# Patient Record
Sex: FEMALE | Race: WHITE | NOT HISPANIC OR LATINO | URBAN - METROPOLITAN AREA
[De-identification: names, ages, dates, MRNs, and addresses within clinical notes are randomized per-mention and may not be internally consistent; named-entity substitution may affect disease eponyms.]

---

## 2017-04-13 ENCOUNTER — OUTPATIENT (OUTPATIENT)
Dept: OUTPATIENT SERVICES | Facility: HOSPITAL | Age: 72
LOS: 1 days | Discharge: HOME | End: 2017-04-13

## 2017-06-27 DIAGNOSIS — J44.1 CHRONIC OBSTRUCTIVE PULMONARY DISEASE WITH (ACUTE) EXACERBATION: ICD-10-CM

## 2017-10-23 ENCOUNTER — OUTPATIENT (OUTPATIENT)
Dept: OUTPATIENT SERVICES | Facility: HOSPITAL | Age: 72
LOS: 1 days | Discharge: HOME | End: 2017-10-23

## 2018-11-15 ENCOUNTER — EMERGENCY (EMERGENCY)
Facility: HOSPITAL | Age: 73
LOS: 0 days | Discharge: HOME | End: 2018-11-15
Admitting: PHYSICIAN ASSISTANT

## 2018-11-15 VITALS
TEMPERATURE: 97 F | RESPIRATION RATE: 18 BRPM | SYSTOLIC BLOOD PRESSURE: 190 MMHG | DIASTOLIC BLOOD PRESSURE: 85 MMHG | OXYGEN SATURATION: 98 % | HEART RATE: 70 BPM

## 2018-11-15 DIAGNOSIS — M79.2 NEURALGIA AND NEURITIS, UNSPECIFIED: ICD-10-CM

## 2018-11-15 DIAGNOSIS — R51 HEADACHE: ICD-10-CM

## 2018-11-15 RX ORDER — CARBAMAZEPINE 200 MG
1 TABLET ORAL
Qty: 20 | Refills: 0
Start: 2018-11-15 | End: 2018-11-24

## 2018-11-15 NOTE — ED PROVIDER NOTE - PROVIDER TOKENS
FREE:[LAST:[[facility]],PHONE:[phone]],FAX:[(   [phone]    -],ADDRESS:[[address] [place] [address], [id number]]]

## 2018-11-15 NOTE — ED PROVIDER NOTE - OBJECTIVE STATEMENT
Pt has 5 days of intermitent shocking right facial pain that lasts for seconds and comes randomly. No vision loss or changes or eye pain. Seen by dental yesterday and had xrays and told pain is not dental. Denies weakness, numbness.

## 2018-11-15 NOTE — ED PROVIDER NOTE - PHYSICAL EXAMINATION
CONST: Well appearing in NAD  EYES: PERRL, EOMI, Sclera and conjunctiva clear.   ENT: No nasal discharge. TM's clear B/L without drainage. Oropharynx normal appearing, no erythema or exudates. Uvula midline. Pt has dentures and no teeth in area of pain. No facial swelling  NECK: Non-tender, no meningeal signs  CARD: Normal S1 S2; Normal rate and rhythm  RESP: Equal BS B/L, No wheezes, rhonchi or rales. No distress  GI: Soft, non-tender, non-distended.  MS: Normal ROM in all extremities. No midline spinal tenderness.  SKIN: Warm, dry, no acute rashes. Good turgor  NEURO: A&Ox3, No focal deficits. Strength 5/5 with no sensory deficits. Steady gait

## 2019-06-24 ENCOUNTER — OUTPATIENT (OUTPATIENT)
Dept: OUTPATIENT SERVICES | Facility: HOSPITAL | Age: 74
LOS: 1 days | Discharge: HOME | End: 2019-06-24
Payer: MEDICARE

## 2019-06-24 DIAGNOSIS — R06.00 DYSPNEA, UNSPECIFIED: ICD-10-CM

## 2019-06-24 PROCEDURE — 71250 CT THORAX DX C-: CPT | Mod: 26

## 2019-07-25 ENCOUNTER — INPATIENT (INPATIENT)
Facility: HOSPITAL | Age: 74
LOS: 0 days | Discharge: HOME | End: 2019-07-26
Attending: INTERNAL MEDICINE | Admitting: INTERNAL MEDICINE
Payer: MEDICARE

## 2019-07-25 VITALS
HEART RATE: 66 BPM | DIASTOLIC BLOOD PRESSURE: 65 MMHG | TEMPERATURE: 98 F | SYSTOLIC BLOOD PRESSURE: 127 MMHG | RESPIRATION RATE: 20 BRPM

## 2019-07-25 DIAGNOSIS — Z90.710 ACQUIRED ABSENCE OF BOTH CERVIX AND UTERUS: Chronic | ICD-10-CM

## 2019-07-25 PROCEDURE — 93010 ELECTROCARDIOGRAM REPORT: CPT | Mod: 59

## 2019-07-25 PROCEDURE — 92943 PRQ TRLUML REVSC CH OCC ANT: CPT | Mod: RC

## 2019-07-25 RX ORDER — CLOPIDOGREL BISULFATE 75 MG/1
75 TABLET, FILM COATED ORAL DAILY
Refills: 0 | Status: DISCONTINUED | OUTPATIENT
Start: 2019-07-25 | End: 2019-07-26

## 2019-07-25 RX ORDER — SODIUM CHLORIDE 9 MG/ML
600 INJECTION INTRAMUSCULAR; INTRAVENOUS; SUBCUTANEOUS
Refills: 0 | Status: DISCONTINUED | OUTPATIENT
Start: 2019-07-25 | End: 2019-07-26

## 2019-07-25 RX ORDER — ASPIRIN/CALCIUM CARB/MAGNESIUM 324 MG
81 TABLET ORAL DAILY
Refills: 0 | Status: DISCONTINUED | OUTPATIENT
Start: 2019-07-25 | End: 2019-07-26

## 2019-07-25 RX ORDER — LANOLIN ALCOHOL/MO/W.PET/CERES
3 CREAM (GRAM) TOPICAL AT BEDTIME
Refills: 0 | Status: DISCONTINUED | OUTPATIENT
Start: 2019-07-25 | End: 2019-07-26

## 2019-07-25 RX ORDER — AMLODIPINE BESYLATE 2.5 MG/1
5 TABLET ORAL DAILY
Refills: 0 | Status: DISCONTINUED | OUTPATIENT
Start: 2019-07-25 | End: 2019-07-26

## 2019-07-25 RX ORDER — ATORVASTATIN CALCIUM 80 MG/1
40 TABLET, FILM COATED ORAL AT BEDTIME
Refills: 0 | Status: DISCONTINUED | OUTPATIENT
Start: 2019-07-25 | End: 2019-07-26

## 2019-07-25 RX ORDER — CARBAMAZEPINE 200 MG
100 TABLET ORAL
Refills: 0 | Status: DISCONTINUED | OUTPATIENT
Start: 2019-07-25 | End: 2019-07-26

## 2019-07-25 RX ORDER — AMLODIPINE BESYLATE 2.5 MG/1
0 TABLET ORAL
Qty: 0 | Refills: 0 | DISCHARGE

## 2019-07-25 RX ADMIN — ATORVASTATIN CALCIUM 40 MILLIGRAM(S): 80 TABLET, FILM COATED ORAL at 22:04

## 2019-07-25 RX ADMIN — Medication 3 MILLIGRAM(S): at 22:04

## 2019-07-25 RX ADMIN — SODIUM CHLORIDE 100 MILLILITER(S): 9 INJECTION INTRAMUSCULAR; INTRAVENOUS; SUBCUTANEOUS at 18:06

## 2019-07-25 NOTE — CHART NOTE - NSCHARTNOTEFT_GEN_A_CORE
PRE-OP DIAGNOSIS: suspected CAD, abnormal stress test    PROCEDURE: [facility] with coronary angiography    Physician: Dr Manuel Sosa  Assistant: Dr Iram Tate    ANESTHESIA TYPE:  [  ]General Anesthesia  [ x ] Sedation  [  ] Local/Regional    ESTIMATED BLOOD LOSS:    10   mL    CONDITION  [  ] Critical  [  ] Serious  [  ]Fair  [ x ]Good    IV CONTRAST:  170   mL    FINDINGS  Left Heart Catheterization:  LVEF%: 75  LVEDP: nl  [ ] Normal Coronary Arteries  [ ] Luminal Irregularities  [ ] Non-obstructive CAD    ACCESS:    [x ] right radial artery  [ x] right femoral artery    LEFT HEART CATHETERIZATION                                    Left main: no disease  LAD: mild atherosclerosis  Left Circumflex: 40% lesion in distal segment  Right Coronary Artery: 100% lesion ( ) in prox segment )      INTERVENTION  IMPLANTS: 1 ALICIA    POST-OP DIAGNOSIS  1 vessel CAD ( prox RCA ) s/p 1 ALICIA .      PLAN OF CARE  [ ] D/C Home today  [ ]  D/C in AM  [ ] Return to In-patient bed  [x ] Admit for observation  [ ] Return for staged procedure:  [ ] CT Surgery consult called  [x ]  Continue DAPT, & Statin therapy PRE-OP DIAGNOSIS: suspected CAD, abnormal stress test    PROCEDURE: [facility] with coronary angiography    Physician: Dr Manuel Sosa  Assistant: Dr Iram Tate    ANESTHESIA TYPE:  [  ]General Anesthesia  [ x ] Sedation  [  ] Local/Regional    ESTIMATED BLOOD LOSS:    10   mL    CONDITION  [  ] Critical  [  ] Serious  [  ]Fair  [ x ]Good    IV CONTRAST:  170   mL    FINDINGS  Left Heart Catheterization:  LVEF%: 75  LVEDP: nl  [ ] Normal Coronary Arteries  [ ] Luminal Irregularities  [ ] Non-obstructive CAD    ACCESS:    [x ] right radial artery  [ x] right femoral artery    LEFT HEART CATHETERIZATION                                    Left main: no disease  LAD: mild atherosclerosis  Left Circumflex: 40% lesion in distal segment  Right Coronary Artery: 100% lesion ( ) in prox segment ) , 40% lesion in distal segment      INTERVENTION  IMPLANTS: 1 ALICIA    POST-OP DIAGNOSIS  1 vessel CAD ( prox RCA ) s/p 1 ALICIA .      PLAN OF CARE  [ ] D/C Home today  [ ]  D/C in AM  [ ] Return to In-patient bed  [x ] Admit for observation  [ ] Return for staged procedure:  [ ] CT Surgery consult called  [x ]  Continue DAPT, & Statin therapy PRE-OP DIAGNOSIS: suspected CAD, abnormal stress test    PROCEDURE: [facility] with coronary angiography    Physician: Dr Manuel Sosa  Assistant: Dr Iram Tate    ANESTHESIA TYPE:  [  ]General Anesthesia  [ x ] Sedation  [  ] Local/Regional    ESTIMATED BLOOD LOSS:    10   mL    CONDITION  [  ] Critical  [  ] Serious  [x ]Fair  [  ]Good    IV CONTRAST:  170   mL    FINDINGS  Left Heart Catheterization:  LVEF%: 65  LVEDP: nl  Severe 1 vessel disease RCA   ACCESS:    [x ] right radial artery  [ x] right femoral artery    LEFT HEART CATHETERIZATION                                    Left main: no disease  LAD: mild extensive atherosclerosis  Left Circumflex: 40% lesion in distal segment  Right Coronary Artery: 100% lesion ( ) in prox segment ) , 40% lesion in distal segment prior to bifurcation to PDa and PLV      INTERVENTION  IMPLANTS: 1 ALICIA    POST-OP DIAGNOSIS  1 vessel CAD ( prox RCA ) s/p 1 ALICIA .      PLAN OF CARE  [ ] D/C Home today  [ ]  D/C in AM  [ ] Return to In-patient bed  [x ] Admit for observation  [ ] Return for staged procedure:  [ ] CT Surgery consult called  [x ]  Continue DAPT, & Statin therapy PRE-OP DIAGNOSIS: suspected CAD, abnormal stress test    PROCEDURE: [facility] with coronary angiography    Physician: Dr Manuel Sosa  Assistant: Dr Iram Tate    ANESTHESIA TYPE:  [  ]General Anesthesia  [ x ] Sedation  [  ] Local/Regional    ESTIMATED BLOOD LOSS:    10   mL    CONDITION  [  ] Critical  [  ] Serious  [x ]Fair  [  ]Good    IV CONTRAST:  170   mL    FINDINGS  Left Heart Catheterization:  LVEF%: 65  LVEDP: nl  Severe 1 vessel disease RCA   ACCESS:    [x ] right radial artery  [ x] right femoral artery    LEFT HEART CATHETERIZATION                                    Left main: no disease  LAD: mild extensive atherosclerosis  Left Circumflex: 40% lesion in distal segment  Right Coronary Artery: 100% lesion ( ) in prox segment ) , 40% lesion in distal segment prior to bifurcation to PDa and PLV      INTERVENTION  IMPLANTS: 1 ALICIA    POST-OP DIAGNOSIS  1 vessel CAD ( prox RCA ) s/p 1 ALICIA .      PLAN OF CARE  [ ] D/C Home today  [x ]  D/C in AM  [ ] Return to In-patient bed  [x ] Admit for observation  [ ] Return for staged procedure:  [ ] CT Surgery consult called  [x ]  Continue DAPT, & Statin therapy

## 2019-07-25 NOTE — H&P CARDIOLOGY - HISTORY OF PRESENT ILLNESS
74 yo female with hx of HTN, CVA, smoker, has been having SOB, YEE, underwent nuclear stress test revelead a moderate partially reversible inferolateral wall defect suggestive of RCA stenosis, scheduled to have cardiac Cath. she feels OK today

## 2019-07-26 ENCOUNTER — TRANSCRIPTION ENCOUNTER (OUTPATIENT)
Age: 74
End: 2019-07-26

## 2019-07-26 VITALS — HEART RATE: 78 BPM | DIASTOLIC BLOOD PRESSURE: 66 MMHG | SYSTOLIC BLOOD PRESSURE: 128 MMHG

## 2019-07-26 LAB
ALBUMIN SERPL ELPH-MCNC: 3.5 G/DL — SIGNIFICANT CHANGE UP (ref 3.5–5.2)
ALP SERPL-CCNC: 97 U/L — SIGNIFICANT CHANGE UP (ref 30–115)
ALT FLD-CCNC: 16 U/L — SIGNIFICANT CHANGE UP (ref 0–41)
ANION GAP SERPL CALC-SCNC: 9 MMOL/L — SIGNIFICANT CHANGE UP (ref 7–14)
AST SERPL-CCNC: 15 U/L — SIGNIFICANT CHANGE UP (ref 0–41)
BILIRUB SERPL-MCNC: 0.5 MG/DL — SIGNIFICANT CHANGE UP (ref 0.2–1.2)
BUN SERPL-MCNC: 8 MG/DL — LOW (ref 10–20)
CALCIUM SERPL-MCNC: 8.8 MG/DL — SIGNIFICANT CHANGE UP (ref 8.5–10.1)
CHLORIDE SERPL-SCNC: 107 MMOL/L — SIGNIFICANT CHANGE UP (ref 98–110)
CO2 SERPL-SCNC: 26 MMOL/L — SIGNIFICANT CHANGE UP (ref 17–32)
CREAT SERPL-MCNC: 0.6 MG/DL — LOW (ref 0.7–1.5)
GLUCOSE SERPL-MCNC: 104 MG/DL — HIGH (ref 70–99)
HCT VFR BLD CALC: 38.8 % — SIGNIFICANT CHANGE UP (ref 37–47)
HGB BLD-MCNC: 12.4 G/DL — SIGNIFICANT CHANGE UP (ref 12–16)
MCHC RBC-ENTMCNC: 28.6 PG — SIGNIFICANT CHANGE UP (ref 27–31)
MCHC RBC-ENTMCNC: 32 G/DL — SIGNIFICANT CHANGE UP (ref 32–37)
MCV RBC AUTO: 89.6 FL — SIGNIFICANT CHANGE UP (ref 81–99)
NRBC # BLD: 0 /100 WBCS — SIGNIFICANT CHANGE UP (ref 0–0)
PLATELET # BLD AUTO: 217 K/UL — SIGNIFICANT CHANGE UP (ref 130–400)
POTASSIUM SERPL-MCNC: 4.1 MMOL/L — SIGNIFICANT CHANGE UP (ref 3.5–5)
POTASSIUM SERPL-SCNC: 4.1 MMOL/L — SIGNIFICANT CHANGE UP (ref 3.5–5)
PROT SERPL-MCNC: 5.5 G/DL — LOW (ref 6–8)
RBC # BLD: 4.33 M/UL — SIGNIFICANT CHANGE UP (ref 4.2–5.4)
RBC # FLD: 13.2 % — SIGNIFICANT CHANGE UP (ref 11.5–14.5)
SODIUM SERPL-SCNC: 142 MMOL/L — SIGNIFICANT CHANGE UP (ref 135–146)
WBC # BLD: 9.41 K/UL — SIGNIFICANT CHANGE UP (ref 4.8–10.8)
WBC # FLD AUTO: 9.41 K/UL — SIGNIFICANT CHANGE UP (ref 4.8–10.8)

## 2019-07-26 PROCEDURE — 93010 ELECTROCARDIOGRAM REPORT: CPT

## 2019-07-26 RX ORDER — CLOPIDOGREL BISULFATE 75 MG/1
1 TABLET, FILM COATED ORAL
Qty: 30 | Refills: 0
Start: 2019-07-26 | End: 2019-08-24

## 2019-07-26 RX ORDER — LANOLIN ALCOHOL/MO/W.PET/CERES
1 CREAM (GRAM) TOPICAL
Qty: 0 | Refills: 0 | DISCHARGE
Start: 2019-07-26

## 2019-07-26 RX ADMIN — Medication 100 MILLIGRAM(S): at 06:02

## 2019-07-26 RX ADMIN — AMLODIPINE BESYLATE 5 MILLIGRAM(S): 2.5 TABLET ORAL at 06:01

## 2019-07-26 NOTE — PROGRESS NOTE ADULT - SUBJECTIVE AND OBJECTIVE BOX
Cardiology Follow up    DESTINY TONEY   73y Female  PAST MEDICAL & SURGICAL HISTORY:  COPD (chronic obstructive pulmonary disease)  HTN (hypertension)  Anxiety  CVA (cerebral vascular accident)  S/P total abdominal hysterectomy       HPI:  72 yo female with hx of HTN, CVA, smoker, has been having SOB, YEE, underwent nuclear stress test revelead a moderate partially reversible inferolateral wall defect suggestive of RCA stenosis, scheduled to have cardiac Cath. she feels OK today (25 Jul 2019 10:12)    Allergies    No Known Allergies    Intolerances      Patient without complaints. Pt ambulated without issues/symptoms  Denies CP, SOB, palpitations, or dizziness  No events on telemetry overnight    Vital Signs Last 24 Hrs  T(C): 36.4 (26 Jul 2019 05:58), Max: 36.7 (25 Jul 2019 16:08)  T(F): 97.6 (26 Jul 2019 05:58), Max: 98.1 (25 Jul 2019 16:08)  HR: 83 (26 Jul 2019 05:58) (66 - 83)  BP: 144/65 (26 Jul 2019 05:58) (127/65 - 144/65)  BP(mean): --  RR: 20 (26 Jul 2019 05:58) (20 - 20)  SpO2: --    MEDICATIONS  (STANDING):  amLODIPine   Tablet 5 milliGRAM(s) Oral daily  aspirin  chewable 81 milliGRAM(s) Oral daily  atorvastatin 40 milliGRAM(s) Oral at bedtime  carBAMazepine ER Capsule. 100 milliGRAM(s) Oral two times a day  clopidogrel Tablet 75 milliGRAM(s) Oral daily  melatonin 3 milliGRAM(s) Oral at bedtime  sodium chloride 0.9%. 600 milliLiter(s) (100 mL/Hr) IV Continuous <Continuous>    MEDICATIONS  (PRN):      REVIEW OF SYSTEMS:          CONSTITUTIONAL: No weakness, fevers or chills          EYES/ENT: No visual changes;  No vertigo or throat pain           NECK: No pain or stiffness          RESPIRATORY: No cough, wheezing, hemoptysis          CARDIOVASCULAR: no pain, no YEE, no palpitations           GASTROINTESTINAL: No abdominal or epigastric pain. No nausea, vomiting, or hematemesis;           GENITOURINARY: No dysuria, frequency or hematuria          NEUROLOGICAL: No numbness or weakness          SKIN: No itching, rashes       EXAM:           CONSTITUTIONAL: Well-developed; well-nourished; in no acute distress  	SKIN: warm, dry  	HEAD: Normocephalic; atraumatic  	EYES: PERRL.  	ENT: No nasal discharge, airway clear, mucous membranes moist  	NECK: Supple; non tender.  	CARD: +S1, +S2, no murmurs, gallops, or rubs. (Regular) rate and rhythm    	RESP: No wheezes, rales or rhonchi. CTA B/L  	ABD: soft ntnd, + BS x 4 quadrants  	EXT: moves all extremities,  no clubbing, cyanosis or edema  	NEURO: Alert and oriented x3, no focal deficits          PSYCH: Cooperative, appropriate          VASCULAR:  + Rad / + PTs / + DPs          EXTREMITY:             Right Groin:  Dressing removed, access site soft, no hematoma, no pain, no numbness, no signs and symptoms of infection  	   Right Radial: Dressing removed, access site soft, no hematoma, no pain, no numbness, no signs and symptoms of infection             ECG:   Ventricular Rate 80 BPM    Atrial Rate 80 BPM    P-R Interval 158 ms    QRS Duration 72 ms    Q-T Interval 386 ms    QTC Calculation(Bezet) 445 ms    P Axis 79 degrees    R Axis 53 degrees    T Axis 63 degrees    Diagnosis Line Normal sinus rhythm  Normal ECG    Confirmed by BARNEY CALDERON MD ([id number]) on 7/26/2019 8:35:08 AM                                                                                                                    LABS:                        12.4   9.41  )-----------( 217      ( 26 Jul 2019 00:52 )             38.8     07-26    142  |  107  |  8<L>  ----------------------------<  104<H>  4.1   |  26  |  0.6<L>    Ca    8.8      26 Jul 2019 00:52    TPro  5.5<L>  /  Alb  3.5  /  TBili  0.5  /  DBili  x   /  AST  15  /  ALT  16  /  AlkPhos  97  07-26        LIVER FUNCTIONS - ( 26 Jul 2019 00:52 )  Alb: 3.5 g/dL / Pro: 5.5 g/dL / ALK PHOS: 97 U/L / ALT: 16 U/L / AST: 15 U/L / GGT: x             A/P:  I discussed the case with Cardiologist Dr. Harden and recommend the following:    S/P PCI  RCA   	     Continue DAPT (asa 81mg daily, plavix 75mg daily),  Statin Therapy with prior meds                   no BB due to asthma/COPD                   Patient given 30 day supply of ( Aspirin 81 mg daily and Plavix 75 mg daily ) to take at home                   Patient agreeing to take DAPT for at least one year or as directed by cardiologist                    Pt given instructions on importance of taking antiplatelet medication or risk acute stent thrombosis/death                   Post cath instructions, access site care and activity restrictions reviewed with patient                     Discussed with patient to return to hospital if experience chest pain, shortness breath, dizziness and site bleeding                   Aggressive risk factor modification, diet counseling, smoking cessation discussed with patient                     smoking cessation education reinforced                     Can discharge patient from cardiac standpoint as discussed with Dr. Harden                   Follow up with Cardiology Dr. Harden in 1-2weeks.  Instructed to call and make an appointment
Subjective:  post elective Cath 100% mid RCA stenosis, post RCA  stent, successful, briefly was admitted for observation over night, no complication, no event, no symptom, eager to go home.  no chest pain, sob, palpitation, n/vomiting, no fever, no bleeding    MEDICATIONS  (STANDING):  amLODIPine   Tablet 5 milliGRAM(s) Oral daily  aspirin  chewable 81 milliGRAM(s) Oral daily  atorvastatin 40 milliGRAM(s) Oral at bedtime  carBAMazepine ER Capsule. 100 milliGRAM(s) Oral two times a day  clopidogrel Tablet 75 milliGRAM(s) Oral daily  melatonin 3 milliGRAM(s) Oral at bedtime  sodium chloride 0.9%. 600 milliLiter(s) (100 mL/Hr) IV Continuous <Continuous>    MEDICATIONS  (PRN):            Vital Signs Last 24 Hrs  T(C): 36.4 (26 Jul 2019 05:58), Max: 36.7 (25 Jul 2019 16:08)  T(F): 97.6 (26 Jul 2019 05:58), Max: 98.1 (25 Jul 2019 16:08)  HR: 83 (26 Jul 2019 05:58) (66 - 83)  BP: 144/65 (26 Jul 2019 05:58) (127/65 - 144/65)  BP(mean): --  RR: 20 (26 Jul 2019 05:58) (20 - 20)  SpO2: --             REVIEW OF SYSTEMS:  CONSTITUTIONAL: no fever, no chills, no diaphoresis  CARDIOLOGY: no chest pain, no SOB, no palpitation, no diaphoresis, no faint   RESPIRATORY: no dyspnea, no wheeze, no orthopnea,   NEUROLOGICAL: no dizziness, headache, focal deficits to report.  GI: no abdominal pain, no dyspepsia, no nausea, no vomiting, no diarrhea.    HEENT: no congestion, no nasal bleeding  SKIN: no ecchymosis, no petechia             PHYSICAL EXAM:  · CONSTITUTIONAL: Looks stable,   . NECK: Supple, no JVD, no bruit on either carotid side   · RESPIRATORY: Normal air entry to lung base, no wheeze, no crackle, no wet rales  · CARDIOVASCULAR: Normal S1, A2, P2, no murmur, no click, regular rate,  no rub,  · EXTREMITIES: No cyanosis, no clubbing, no edema, right radial pulse 2+, mild echymosis  · VASCULAR: Pulses are regular, equal, bilateral in upper and lower extremities  	  TELEMETRY: NSR    ECG: < from: 12 Lead ECG (07.26.19 @ 07:32) >   Normal sinus rhythm  Normal ECG    < end of copied text >      TTE: outside, normal ejection fraction    LABS:                        12.4   9.41  )-----------( 217      ( 26 Jul 2019 00:52 )             38.8     07-26    142  |  107  |  8<L>  ----------------------------<  104<H>  4.1   |  26  |  0.6<L>    Ca    8.8      26 Jul 2019 00:52    TPro  5.5<L>  /  Alb  3.5  /  TBili  0.5  /  DBili  x   /  AST  15  /  ALT  16  /  AlkPhos  97  07-26            I&O's Summary    BNP  RADIOLOGY & ADDITIONAL STUDIES:    IMPRESSION AND PLAN:

## 2019-07-26 NOTE — DISCHARGE NOTE PROVIDER - NSDCCPCAREPLAN_GEN_ALL_CORE_FT
PRINCIPAL DISCHARGE DIAGNOSIS  Diagnosis: CAD S/P percutaneous coronary angioplasty  Assessment and Plan of Treatment: continue medical regimen as prescribed

## 2019-07-26 NOTE — PROGRESS NOTE ADULT - ASSESSMENT
post elective cath,  of the RCA  successful PCI-stent of the RCA    stable to be d/c home   current therapy  f/u with me at  in 2-3 weeks

## 2019-07-26 NOTE — DISCHARGE NOTE PROVIDER - NSDCFUADDINST_GEN_ALL_CORE_FT
no heavy lifting > 10lbs x 1 week; no driving x 24 hours; no baths, swimming pools x 1 week; may shower  continue DAPT, Statin.  Instructed to call 911 if chest pain, shortness of breath or bleeding from access site. F/U with cardiologist in 1 week  low sodium low fat low cholesterol diet  continue medical regimen to prevent chest pain

## 2019-07-26 NOTE — DISCHARGE NOTE NURSING/CASE MANAGEMENT/SOCIAL WORK - NSDCDPATPORTLINK_GEN_ALL_CORE
You can access the [facility] Patient Portal, offered by [facility], by registering with the following website: [email or web address]

## 2019-07-26 NOTE — DISCHARGE NOTE PROVIDER - HOSPITAL COURSE
74 y/o F s/p PCI  RCA, admitted for observation, no events overnight, stable for discharge as discussed with Dr. Harden

## 2019-07-26 NOTE — DISCHARGE NOTE NURSING/CASE MANAGEMENT/SOCIAL WORK - NSDCPEEMAIL_GEN_ALL_CORE
[facility] for Tobacco Control email [email or web address]

## 2019-07-26 NOTE — DISCHARGE NOTE PROVIDER - CARE PROVIDER_API CALL
Charlene Harden)  Cardiology; Internal Medicine; Nuclear Cardiology  [address] [place] [address]  Phone: [phone]  Fax: [phone]  Follow Up Time: 2 weeks

## 2019-07-26 NOTE — DISCHARGE NOTE PROVIDER - NSDCCPGOAL_GEN_ALL_CORE_FT
continue medical regimen to prevent chest pain. To get better and follow your care plan as instructed.

## 2019-07-26 NOTE — DISCHARGE NOTE NURSING/CASE MANAGEMENT/SOCIAL WORK - NSDCPEPTSTRK_GEN_ALL_CORE
Call 911 for stroke/Risk factors for stroke/Stroke warning signs and symptoms/Signs and symptoms of stroke/Stroke education booklet/Need for follow up after discharge/Prescribed medications/Stroke support groups for patients, families, and friends

## 2019-07-26 NOTE — DISCHARGE NOTE PROVIDER - NSDCCPTREATMENT_GEN_ALL_CORE_FT
PRINCIPAL PROCEDURE  Procedure: PCI, for chronic total coronary artery occlusion  Findings and Treatment: continue medical regimen as prescribed

## 2019-08-01 DIAGNOSIS — I25.10 ATHEROSCLEROTIC HEART DISEASE OF NATIVE CORONARY ARTERY WITHOUT ANGINA PECTORIS: ICD-10-CM

## 2019-08-01 DIAGNOSIS — F41.9 ANXIETY DISORDER, UNSPECIFIED: ICD-10-CM

## 2019-08-01 DIAGNOSIS — J44.9 CHRONIC OBSTRUCTIVE PULMONARY DISEASE, UNSPECIFIED: ICD-10-CM

## 2019-08-01 DIAGNOSIS — Z90.710 ACQUIRED ABSENCE OF BOTH CERVIX AND UTERUS: ICD-10-CM

## 2019-08-01 DIAGNOSIS — I25.82 CHRONIC TOTAL OCCLUSION OF CORONARY ARTERY: ICD-10-CM

## 2019-08-01 DIAGNOSIS — F17.210 NICOTINE DEPENDENCE, CIGARETTES, UNCOMPLICATED: ICD-10-CM

## 2019-08-01 DIAGNOSIS — Z86.73 PERSONAL HISTORY OF TRANSIENT ISCHEMIC ATTACK (TIA), AND CEREBRAL INFARCTION WITHOUT RESIDUAL DEFICITS: ICD-10-CM

## 2019-08-01 DIAGNOSIS — I10 ESSENTIAL (PRIMARY) HYPERTENSION: ICD-10-CM

## 2019-08-01 DIAGNOSIS — R94.39 ABNORMAL RESULT OF OTHER CARDIOVASCULAR FUNCTION STUDY: ICD-10-CM

## 2021-03-02 PROBLEM — I10 ESSENTIAL (PRIMARY) HYPERTENSION: Chronic | Status: ACTIVE | Noted: 2019-07-25

## 2021-03-02 PROBLEM — Z00.00 ENCOUNTER FOR PREVENTIVE HEALTH EXAMINATION: Status: ACTIVE | Noted: 2021-03-02

## 2021-03-02 PROBLEM — J44.9 CHRONIC OBSTRUCTIVE PULMONARY DISEASE, UNSPECIFIED: Chronic | Status: ACTIVE | Noted: 2019-07-25

## 2021-03-02 PROBLEM — I63.9 CEREBRAL INFARCTION, UNSPECIFIED: Chronic | Status: ACTIVE | Noted: 2019-07-25

## 2021-03-02 PROBLEM — F41.9 ANXIETY DISORDER, UNSPECIFIED: Chronic | Status: ACTIVE | Noted: 2019-07-25

## 2021-03-02 RX ORDER — UMECLIDINIUM BROMIDE AND VILANTEROL TRIFENATATE 62.5; 25 UG/1; UG/1
62.5-25 POWDER RESPIRATORY (INHALATION) DAILY
Qty: 3 | Refills: 3 | Status: ACTIVE | COMMUNITY
Start: 2021-03-02 | End: 1900-01-01

## 2021-03-16 ENCOUNTER — NON-APPOINTMENT (OUTPATIENT)
Age: 76
End: 2021-03-16

## 2021-03-16 DIAGNOSIS — Z87.891 PERSONAL HISTORY OF NICOTINE DEPENDENCE: ICD-10-CM

## 2021-03-16 RX ORDER — ATORVASTATIN CALCIUM 80 MG/1
TABLET, FILM COATED ORAL
Refills: 0 | Status: ACTIVE | COMMUNITY

## 2021-03-16 RX ORDER — AMLODIPINE BESYLATE 5 MG/1
5 TABLET ORAL
Refills: 0 | Status: ACTIVE | COMMUNITY

## 2021-03-16 RX ORDER — ALBUTEROL SULFATE 90 UG/1
108 (90 BASE) AEROSOL, METERED RESPIRATORY (INHALATION)
Refills: 0 | Status: ACTIVE | COMMUNITY

## 2021-03-16 RX ORDER — ASPIRIN 325 MG/1
TABLET, FILM COATED ORAL
Refills: 0 | Status: ACTIVE | COMMUNITY

## 2021-03-24 ENCOUNTER — APPOINTMENT (OUTPATIENT)
Dept: PULMONOLOGY | Facility: CLINIC | Age: 76
End: 2021-03-24
Payer: MEDICARE

## 2021-03-24 VITALS
BODY MASS INDEX: 26.52 KG/M2 | SYSTOLIC BLOOD PRESSURE: 124 MMHG | HEIGHT: 66 IN | HEART RATE: 110 BPM | DIASTOLIC BLOOD PRESSURE: 72 MMHG | RESPIRATION RATE: 14 BRPM | WEIGHT: 165 LBS | OXYGEN SATURATION: 96 %

## 2021-03-24 PROCEDURE — 99072 ADDL SUPL MATRL&STAF TM PHE: CPT

## 2021-03-24 PROCEDURE — 99213 OFFICE O/P EST LOW 20 MIN: CPT

## 2021-07-02 ENCOUNTER — OUTPATIENT (OUTPATIENT)
Dept: OUTPATIENT SERVICES | Facility: HOSPITAL | Age: 76
LOS: 1 days | Discharge: HOME | End: 2021-07-02
Payer: MEDICARE

## 2021-07-02 DIAGNOSIS — Z12.31 ENCOUNTER FOR SCREENING MAMMOGRAM FOR MALIGNANT NEOPLASM OF BREAST: ICD-10-CM

## 2021-07-02 DIAGNOSIS — Z90.710 ACQUIRED ABSENCE OF BOTH CERVIX AND UTERUS: Chronic | ICD-10-CM

## 2021-07-02 PROCEDURE — 77063 BREAST TOMOSYNTHESIS BI: CPT | Mod: 26

## 2021-07-02 PROCEDURE — 77067 SCR MAMMO BI INCL CAD: CPT | Mod: 26

## 2021-09-15 ENCOUNTER — APPOINTMENT (OUTPATIENT)
Dept: PULMONOLOGY | Facility: CLINIC | Age: 76
End: 2021-09-15

## 2022-05-04 ENCOUNTER — RX RENEWAL (OUTPATIENT)
Age: 77
End: 2022-05-04

## 2022-11-27 ENCOUNTER — EMERGENCY (EMERGENCY)
Facility: HOSPITAL | Age: 77
LOS: 0 days | Discharge: LEFT BEFORE DISCHARGE | End: 2022-11-27
Attending: EMERGENCY MEDICINE | Admitting: EMERGENCY MEDICINE
Payer: MEDICARE

## 2022-11-27 VITALS
OXYGEN SATURATION: 98 % | TEMPERATURE: 98 F | HEART RATE: 80 BPM | DIASTOLIC BLOOD PRESSURE: 65 MMHG | RESPIRATION RATE: 21 BRPM | SYSTOLIC BLOOD PRESSURE: 144 MMHG

## 2022-11-27 VITALS
RESPIRATION RATE: 16 BRPM | TEMPERATURE: 97 F | HEART RATE: 77 BPM | DIASTOLIC BLOOD PRESSURE: 61 MMHG | OXYGEN SATURATION: 95 % | SYSTOLIC BLOOD PRESSURE: 165 MMHG | WEIGHT: 154.98 LBS

## 2022-11-27 DIAGNOSIS — Z79.82 LONG TERM (CURRENT) USE OF ASPIRIN: ICD-10-CM

## 2022-11-27 DIAGNOSIS — Z86.73 PERSONAL HISTORY OF TRANSIENT ISCHEMIC ATTACK (TIA), AND CEREBRAL INFARCTION WITHOUT RESIDUAL DEFICITS: ICD-10-CM

## 2022-11-27 DIAGNOSIS — Z90.710 ACQUIRED ABSENCE OF BOTH CERVIX AND UTERUS: Chronic | ICD-10-CM

## 2022-11-27 DIAGNOSIS — E78.00 PURE HYPERCHOLESTEROLEMIA, UNSPECIFIED: ICD-10-CM

## 2022-11-27 DIAGNOSIS — F41.9 ANXIETY DISORDER, UNSPECIFIED: ICD-10-CM

## 2022-11-27 DIAGNOSIS — I67.1 CEREBRAL ANEURYSM, NONRUPTURED: ICD-10-CM

## 2022-11-27 DIAGNOSIS — I25.10 ATHEROSCLEROTIC HEART DISEASE OF NATIVE CORONARY ARTERY WITHOUT ANGINA PECTORIS: ICD-10-CM

## 2022-11-27 DIAGNOSIS — J44.9 CHRONIC OBSTRUCTIVE PULMONARY DISEASE, UNSPECIFIED: ICD-10-CM

## 2022-11-27 DIAGNOSIS — Z20.822 CONTACT WITH AND (SUSPECTED) EXPOSURE TO COVID-19: ICD-10-CM

## 2022-11-27 DIAGNOSIS — Z90.710 ACQUIRED ABSENCE OF BOTH CERVIX AND UTERUS: ICD-10-CM

## 2022-11-27 DIAGNOSIS — Z95.5 PRESENCE OF CORONARY ANGIOPLASTY IMPLANT AND GRAFT: ICD-10-CM

## 2022-11-27 DIAGNOSIS — R42 DIZZINESS AND GIDDINESS: ICD-10-CM

## 2022-11-27 DIAGNOSIS — F17.200 NICOTINE DEPENDENCE, UNSPECIFIED, UNCOMPLICATED: ICD-10-CM

## 2022-11-27 DIAGNOSIS — Z53.21 PROCEDURE AND TREATMENT NOT CARRIED OUT DUE TO PATIENT LEAVING PRIOR TO BEING SEEN BY HEALTH CARE PROVIDER: ICD-10-CM

## 2022-11-27 DIAGNOSIS — R26.81 UNSTEADINESS ON FEET: ICD-10-CM

## 2022-11-27 LAB
ALBUMIN SERPL ELPH-MCNC: 4.4 G/DL — SIGNIFICANT CHANGE UP (ref 3.5–5.2)
ALP SERPL-CCNC: 106 U/L — SIGNIFICANT CHANGE UP (ref 30–115)
ALT FLD-CCNC: 21 U/L — SIGNIFICANT CHANGE UP (ref 0–41)
ANION GAP SERPL CALC-SCNC: 12 MMOL/L — SIGNIFICANT CHANGE UP (ref 7–14)
APPEARANCE UR: ABNORMAL
AST SERPL-CCNC: 27 U/L — SIGNIFICANT CHANGE UP (ref 0–41)
BACTERIA # UR AUTO: NEGATIVE — SIGNIFICANT CHANGE UP
BASOPHILS # BLD AUTO: 0.07 K/UL — SIGNIFICANT CHANGE UP (ref 0–0.2)
BASOPHILS NFR BLD AUTO: 1 % — SIGNIFICANT CHANGE UP (ref 0–1)
BILIRUB SERPL-MCNC: 0.6 MG/DL — SIGNIFICANT CHANGE UP (ref 0.2–1.2)
BILIRUB UR-MCNC: NEGATIVE — SIGNIFICANT CHANGE UP
BUN SERPL-MCNC: 11 MG/DL — SIGNIFICANT CHANGE UP (ref 10–20)
CALCIUM SERPL-MCNC: 9.3 MG/DL — SIGNIFICANT CHANGE UP (ref 8.4–10.5)
CHLORIDE SERPL-SCNC: 103 MMOL/L — SIGNIFICANT CHANGE UP (ref 98–110)
CO2 SERPL-SCNC: 26 MMOL/L — SIGNIFICANT CHANGE UP (ref 17–32)
COLOR SPEC: YELLOW — SIGNIFICANT CHANGE UP
CREAT SERPL-MCNC: 0.7 MG/DL — SIGNIFICANT CHANGE UP (ref 0.7–1.5)
DIFF PNL FLD: NEGATIVE — SIGNIFICANT CHANGE UP
EGFR: 88 ML/MIN/1.73M2 — SIGNIFICANT CHANGE UP
EOSINOPHIL # BLD AUTO: 0.14 K/UL — SIGNIFICANT CHANGE UP (ref 0–0.7)
EOSINOPHIL NFR BLD AUTO: 1.9 % — SIGNIFICANT CHANGE UP (ref 0–8)
EPI CELLS # UR: 12 /HPF — HIGH (ref 0–5)
GLUCOSE SERPL-MCNC: 117 MG/DL — HIGH (ref 70–99)
GLUCOSE UR QL: NEGATIVE — SIGNIFICANT CHANGE UP
HCT VFR BLD CALC: 47 % — SIGNIFICANT CHANGE UP (ref 37–47)
HGB BLD-MCNC: 15.3 G/DL — SIGNIFICANT CHANGE UP (ref 12–16)
HYALINE CASTS # UR AUTO: 3 /LPF — SIGNIFICANT CHANGE UP (ref 0–7)
IMM GRANULOCYTES NFR BLD AUTO: 0.3 % — SIGNIFICANT CHANGE UP (ref 0.1–0.3)
KETONES UR-MCNC: NEGATIVE — SIGNIFICANT CHANGE UP
LEUKOCYTE ESTERASE UR-ACNC: ABNORMAL
LYMPHOCYTES # BLD AUTO: 1.73 K/UL — SIGNIFICANT CHANGE UP (ref 1.2–3.4)
LYMPHOCYTES # BLD AUTO: 23.7 % — SIGNIFICANT CHANGE UP (ref 20.5–51.1)
MAGNESIUM SERPL-MCNC: 1.9 MG/DL — SIGNIFICANT CHANGE UP (ref 1.8–2.4)
MCHC RBC-ENTMCNC: 29.9 PG — SIGNIFICANT CHANGE UP (ref 27–31)
MCHC RBC-ENTMCNC: 32.6 G/DL — SIGNIFICANT CHANGE UP (ref 32–37)
MCV RBC AUTO: 91.8 FL — SIGNIFICANT CHANGE UP (ref 81–99)
MONOCYTES # BLD AUTO: 0.43 K/UL — SIGNIFICANT CHANGE UP (ref 0.1–0.6)
MONOCYTES NFR BLD AUTO: 5.9 % — SIGNIFICANT CHANGE UP (ref 1.7–9.3)
NEUTROPHILS # BLD AUTO: 4.92 K/UL — SIGNIFICANT CHANGE UP (ref 1.4–6.5)
NEUTROPHILS NFR BLD AUTO: 67.2 % — SIGNIFICANT CHANGE UP (ref 42.2–75.2)
NITRITE UR-MCNC: NEGATIVE — SIGNIFICANT CHANGE UP
NRBC # BLD: 0 /100 WBCS — SIGNIFICANT CHANGE UP (ref 0–0)
PH UR: 5.5 — SIGNIFICANT CHANGE UP (ref 5–8)
PLATELET # BLD AUTO: 221 K/UL — SIGNIFICANT CHANGE UP (ref 130–400)
POTASSIUM SERPL-MCNC: 4.6 MMOL/L — SIGNIFICANT CHANGE UP (ref 3.5–5)
POTASSIUM SERPL-SCNC: 4.6 MMOL/L — SIGNIFICANT CHANGE UP (ref 3.5–5)
PROT SERPL-MCNC: 6.6 G/DL — SIGNIFICANT CHANGE UP (ref 6–8)
PROT UR-MCNC: SIGNIFICANT CHANGE UP
RBC # BLD: 5.12 M/UL — SIGNIFICANT CHANGE UP (ref 4.2–5.4)
RBC # FLD: 13.6 % — SIGNIFICANT CHANGE UP (ref 11.5–14.5)
RBC CASTS # UR COMP ASSIST: 2 /HPF — SIGNIFICANT CHANGE UP (ref 0–4)
SARS-COV-2 RNA SPEC QL NAA+PROBE: SIGNIFICANT CHANGE UP
SODIUM SERPL-SCNC: 141 MMOL/L — SIGNIFICANT CHANGE UP (ref 135–146)
SP GR SPEC: 1.02 — SIGNIFICANT CHANGE UP (ref 1.01–1.03)
TROPONIN T SERPL-MCNC: <0.01 NG/ML — SIGNIFICANT CHANGE UP
UROBILINOGEN FLD QL: SIGNIFICANT CHANGE UP
WBC # BLD: 7.31 K/UL — SIGNIFICANT CHANGE UP (ref 4.8–10.8)
WBC # FLD AUTO: 7.31 K/UL — SIGNIFICANT CHANGE UP (ref 4.8–10.8)
WBC UR QL: 8 /HPF — HIGH (ref 0–5)

## 2022-11-27 PROCEDURE — 99283 EMERGENCY DEPT VISIT LOW MDM: CPT

## 2022-11-27 PROCEDURE — 99220: CPT

## 2022-11-27 PROCEDURE — 70496 CT ANGIOGRAPHY HEAD: CPT | Mod: 26,MA

## 2022-11-27 PROCEDURE — 70551 MRI BRAIN STEM W/O DYE: CPT | Mod: 26,MA

## 2022-11-27 PROCEDURE — 70498 CT ANGIOGRAPHY NECK: CPT | Mod: 26,MA

## 2022-11-27 PROCEDURE — 71045 X-RAY EXAM CHEST 1 VIEW: CPT | Mod: 26

## 2022-11-27 PROCEDURE — 93010 ELECTROCARDIOGRAM REPORT: CPT

## 2022-11-27 PROCEDURE — 70450 CT HEAD/BRAIN W/O DYE: CPT | Mod: 26,59,MA

## 2022-11-27 RX ORDER — TIOTROPIUM BROMIDE 18 UG/1
1 CAPSULE ORAL; RESPIRATORY (INHALATION) DAILY
Refills: 0 | Status: DISCONTINUED | OUTPATIENT
Start: 2022-11-27 | End: 2022-11-27

## 2022-11-27 RX ORDER — SODIUM CHLORIDE 9 MG/ML
1000 INJECTION, SOLUTION INTRAVENOUS ONCE
Refills: 0 | Status: COMPLETED | OUTPATIENT
Start: 2022-11-27 | End: 2022-11-27

## 2022-11-27 RX ORDER — ASPIRIN/CALCIUM CARB/MAGNESIUM 324 MG
81 TABLET ORAL DAILY
Refills: 0 | Status: DISCONTINUED | OUTPATIENT
Start: 2022-11-27 | End: 2022-11-27

## 2022-11-27 RX ORDER — ATORVASTATIN CALCIUM 80 MG/1
10 TABLET, FILM COATED ORAL AT BEDTIME
Refills: 0 | Status: DISCONTINUED | OUTPATIENT
Start: 2022-11-27 | End: 2022-11-27

## 2022-11-27 RX ORDER — AMLODIPINE BESYLATE 2.5 MG/1
5 TABLET ORAL DAILY
Refills: 0 | Status: DISCONTINUED | OUTPATIENT
Start: 2022-11-27 | End: 2022-11-27

## 2022-11-27 RX ADMIN — SODIUM CHLORIDE 1000 MILLILITER(S): 9 INJECTION, SOLUTION INTRAVENOUS at 11:56

## 2022-11-27 NOTE — ED CDU PROVIDER DISPOSITION NOTE - CLINICAL COURSE
patient placed in observation for vertigo for evaluation of peripheral vs central. CTA showed aneurysm. Patient obtained MRI but results were not present at the time patient wanted to leave. The patient wishes to leave against medical advice.  I have discussed the risks, benefits and alternatives (including the possibility of worsening of disease, pain, permanent disability, and/or death) with the patient and his/her family (if available).  The patient voices understanding of these risks, benefits, and alternatives and still wishes to sign out against medical advice.  The patient is awake, alert, oriented  x 3 and has demonstrated capacity to refuse/direct care.  I have advised the patient that they can and should return immediately should they develop any worse/different/additional symptoms, or if they change their mind and want to continue their care.

## 2022-11-27 NOTE — ED CDU PROVIDER DISPOSITION NOTE - CARE PROVIDER_API CALL
Alejandro Mansfield)  Neurology  [address] [place] [address]  Phone: [phone]  Fax: [phone]  Established Patient  Follow Up Time: 7-10 Days

## 2022-11-27 NOTE — ED CDU PROVIDER DISPOSITION NOTE - PATIENT PORTAL LINK FT
You can access the FollowMyHealth Patient Portal offered by [facility] by registering at the following website: [email or web address]. By joining [facility]’s FollowMyHealth portal, you will also be able to view your health information using other applications (apps) compatible with our system.

## 2022-11-27 NOTE — ED CDU PROVIDER INITIAL DAY NOTE - NSICDXPASTMEDICALHX_GEN_ALL_CORE_FT
PAST MEDICAL HISTORY:  Anxiety     COPD (chronic obstructive pulmonary disease)     CVA (cerebral vascular accident)     HTN (hypertension)

## 2022-11-27 NOTE — CONSULT NOTE ADULT - SUBJECTIVE AND OBJECTIVE BOX
Neurology Consult    Patient is a 77y old  Female who presents with a chief complaint of dizziness    HPI:  77-year-old female with past medical history of COPD not on home O2 +smoker, CAD status post stent, hyperlipidemia presenting for evaluation of unsteadiness over the last several weeks.  Symptoms are moderate.  No alleviating or aggravating factors.  States that symptoms are worse when she tries to walk feels like she cannot walk straight.  States that she was getting her hair colored yesterday and when she stood up she had to grab onto something to prevent her from falling.    PAST MEDICAL & SURGICAL HISTORY:  CVA (cerebral vascular accident)      Anxiety      HTN (hypertension)      COPD (chronic obstructive pulmonary disease)      S/P total abdominal hysterectomy          FAMILY HISTORY:      Social History: (-) x 3    Allergies    No Known Allergies    Intolerances        MEDICATIONS  (STANDING):    MEDICATIONS  (PRN):      Vital Signs Last 24 Hrs  T(C): 36.1 (2022 10:06), Max: 36.1 (2022 10:06)  T(F): 97 (2022 10:06), Max: 97 (2022 10:06)  HR: 77 (2022 10:06) (77 - 77)  BP: 165/61 (2022 10:06) (165/61 - 165/61)  BP(mean): --  RR: 16 (2022 10:06) (16 - 16)  SpO2: 95% (2022 10:06) (95% - 95%)    Parameters below as of 2022 10:06  Patient On (Oxygen Delivery Method): room air        Examination:  Cognitive/Language:  The patient is oriented to person, place, time and date.  Recent and remote memory intact.  Fund of knowledge is intact and normal.  Language with normal repetition, comprehension and naming.  Nondysarthric.    Eyes: intact VA, VFF.  EOMI w/o nystagmus, skew or reported double vision.  PERRL.  No ptosis/weakness of eyelid closure.    Face:  Facial sensation normal V1 - 3, no facial asymmetry.    Motor examination:   Normal tone, bulk and range of motion.  No tenderness, twitching, tremors or involuntary movements.  Formal Muscle Strength Testing: (MRC grade R/L) 5/5 UE; 5/5 LE.  No observable drift.  Sensory examination:   Intact to light touch and pinprick, pain, temperature and proprioception and vibration in all extremities.  Cerebellum:   FTN/HKS intact with normal ABDIAS in all limbs.  No dysmetria or dysdiadokinesia.  Gait narrow based and normal on gait exam.    NIHSS 0    Labs:   CBC Full  -  ( 2022 11:25 )  WBC Count : 7.31 K/uL  RBC Count : 5.12 M/uL  Hemoglobin : 15.3 g/dL  Hematocrit : 47.0 %  Platelet Count - Automated : 221 K/uL  Mean Cell Volume : 91.8 fL  Mean Cell Hemoglobin : 29.9 pg  Mean Cell Hemoglobin Concentration : 32.6 g/dL  Auto Neutrophil # : 4.92 K/uL  Auto Lymphocyte # : 1.73 K/uL  Auto Monocyte # : 0.43 K/uL  Auto Eosinophil # : 0.14 K/uL  Auto Basophil # : 0.07 K/uL  Auto Neutrophil % : 67.2 %  Auto Lymphocyte % : 23.7 %  Auto Monocyte % : 5.9 %  Auto Eosinophil % : 1.9 %  Auto Basophil % : 1.0 %        141  |  103  |  11  ----------------------------<  117<H>  4.6   |  26  |  0.7    Ca    9.3      2022 11:25  Mg     1.9         TPro  6.6  /  Alb  4.4  /  TBili  0.6  /  DBili  x   /  AST  27  /  ALT  21  /  AlkPhos  106      LIVER FUNCTIONS - ( 2022 11:25 )  Alb: 4.4 g/dL / Pro: 6.6 g/dL / ALK PHOS: 106 U/L / ALT: 21 U/L / AST: 27 U/L / GGT: x             Urinalysis Basic - ( 2022 13:09 )    Color: Yellow / Appearance: Slightly Turbid / S.017 / pH: x  Gluc: x / Ketone: Negative  / Bili: Negative / Urobili: <2 mg/dL   Blood: x / Protein: Trace / Nitrite: Negative   Leuk Esterase: Large / RBC: 2 /HPF / WBC 8 /HPF   Sq Epi: x / Non Sq Epi: 12 /HPF / Bacteria: Negative          Neuroimaging:  NCHCT: CT Head No Cont:   ACC: [id number] EXAM:  CT BRAIN                            < from: CT Angio Neck w/ IV Cont (22 @ 14:32) >  IMPRESSION:    CTA HEAD:  No intracranial vessel stenosis or occlusion.    Of note there is a prominent vascular loop involving the left inferior M2   division of the MCA with fusiform aneurysmal dilation measuring up to 4   mm.    CTA NECK:  No evidence of carotid or vertebral artery stenosis.    --- End of Report ---    < end of copied text >  IMPRESSION:  Focal hyperdensity is noted in the region of the distal left M1 segment   of the MCA. A follow-up CTA of the brain is recommended for further   evaluation.    Otherwise no evidence of intracranial hemorrhage, mass effect or midline   shift.    These findings were discussed with Dr. HERBER WRIGHT [phone] at   2022 1:11 PM by  with read back confirmation.    --- End of Report ---            IVETTE SERVIN MD; Attending Radiologist  This document has been electronically signed. 2022  1:12PM (22 @ 11:43)      22 @ 17:09

## 2022-11-27 NOTE — ED CDU PROVIDER DISPOSITION NOTE - ATTENDING APP SHARED VISIT CONTRIBUTION OF CARE
patient evaluated for vertigo. MRI results pending. CT imaging and neuroc consultation were obtained patient evaluated for dizziness, MRI was pending after cta showed anuerysm. neuro exam is nml with nml gait. patient wanst to leave AMA despite MRI results pending.

## 2022-11-27 NOTE — ED ADULT TRIAGE NOTE - CHIEF COMPLAINT QUOTE
Pt states "my equilibrium is off" For a couple of weeks pt feels unsteady and off balanced, Pt reports she went to urgent care yesterday after falling off a chair at the hair salon.

## 2022-11-27 NOTE — ED PROVIDER NOTE - OBJECTIVE STATEMENT
77-year-old female with past medical history of COPD not on home O2 +smoker, CAD status post stent, hyperlipidemia presenting for evaluation of unsteadiness over the last several weeks.  Symptoms are moderate.  No alleviating or aggravating factors.  States that symptoms are worse when she tries to walk feels like she cannot walk straight.  States that she was getting her hair colored yesterday and when she stood up she had to grab onto something to prevent her from falling.  No head injury or LOC.  No blurry vision, paresthesias, weakness, fevers, chills, chest pain, shortness of breath, abdominal pain, nausea, vomiting, diarrhea, headache, or urinary symptoms.

## 2022-11-27 NOTE — ED PROVIDER NOTE - ATTENDING APP SHARED VISIT CONTRIBUTION OF CARE
77-year-old female past medical history of COPD, no home oxygen use, current smoker, HTN, CVA, anxiety, CAD presented for evaluation of feeling off balance for the past several weeks.  Yesterday patient went to a hairdresser to get her hair colored, upon getting off the chair she felt very off balance; went to urgent care center and was advised to go to the emergency room for evaluation.  Patient denies any lightheadedness, blurry or double vision, no focal weakness or paresthesias, no chest pain/palpitations, no shortness of breath, no leg pain or swelling, no change in exercise tolerance, no nausea/ vomiting, no tinnitus or any additional acute complaints.  Well-appearing well-nourished female in  NAD, head AT/NC, PERRL, pink conjunctivae,  mmm, nml phonation without drooling or trismus, supple neck without midline spine ttp, nml work of breathing, lungs CTA b/l, equal air entry, RRR, well-perfused extremities, distal pulses intact, abdomen soft, NT/ND, BS present in all quadrants, no midline spine or CVA ttp, no leg edema or unilateral calf swelling, A&Ox3, no motor  neuro deficits, no facial droop, ambulating with a steady gait, no past pointing,, nml mood and affect.  Plan: Labs, EKG, neuroimaging, neurology consult.

## 2022-11-27 NOTE — ED PROVIDER NOTE - PROGRESS NOTE DETAILS
EP: Patient appears very well, CT head is suspicious for a thrombus in one of the branches of the MCA , CTA recommended.  Patient aware of the findings and further work-up plan. Spoke to neuro NP Aleks recommends obs for mr brain non con Spoke to neuro NP Aleks recommends obs for mr brain non con.

## 2022-11-27 NOTE — ED CDU PROVIDER INITIAL DAY NOTE - NS ED ROS FT
CONST: No fever, chills or bodyaches  EYES: No pain, redness, drainage or visual changes.  ENT: No ear pain or discharge, nasal discharge or congestion. No sore throat  CARD: No chest pain, palpitations  RESP: No SOB, cough, hemoptysis. No hx of asthma or COPD  GI: No abdominal pain, N/V/D  MS: No joint pain, back pain or extremity pain/injury  SKIN: No rashes  NEURO: No headache,  paresthesias or LOC  : no dysuria

## 2022-11-27 NOTE — ED PROVIDER NOTE - NS ED ROS FT
Review of Systems:  	•	CONSTITUTIONAL - no fever, no diaphoresis, no chills  	•	SKIN - no rash  	•	HEMATOLOGIC - no bleeding, no bruising  	•	EYES - no eye pain, no blurry vision  	•	ENT - no congestion  	•	RESPIRATORY - no shortness of breath, no cough  	•	CARDIAC - no chest pain, no palpitations  	•	GI - no abd pain, no nausea, no vomiting, no diarrhea, no constipation  	•	GENITO-URINARY - no dysuria; no hematuria, no increased urinary frequency  	•	MUSCULOSKELETAL - no joint paint, no swelling, no redness  	•	NEUROLOGIC - +unsteadiness, no weakness, no headache, no paresthesias, no LOC  	•	PSYCH - no anxiety, no depression  	All other ROS are negative except as documented in HPI.

## 2022-11-27 NOTE — ED CDU PROVIDER INITIAL DAY NOTE - PHYSICAL EXAMINATION
CONST: Well appearing in NAD  EYES: PERRL, EOMI, Sclera and conjunctiva clear.   NECK: Non-tender, no meningeal signs  CARD: Normal S1 S2; Normal rate and rhythm  RESP: Equal BS B/L, No wheezes, rhonchi or rales. No distress  GI: Soft, non-tender, non-distended.  MS: Normal ROM in all extremities. No midline spinal tenderness.  SKIN: Warm, dry, no acute rashes. Good turgor  NEURO: A&Ox3, No focal deficits. Strength 5/5 with no sensory deficits.

## 2022-11-27 NOTE — ED CDU PROVIDER INITIAL DAY NOTE - PROGRESS NOTE DETAILS
received sigout from Sha Mendez pt in Obs for evaluation of dizziness; ct head, cta h/n ? mca aneurysm; seen by neuro rec mri brain; will continue to follow; requested labs ordered;

## 2022-11-27 NOTE — ED ADULT NURSE NOTE - OBJECTIVE STATEMENT
patient c/o dizziness as if the room is spinning for the past few weeks. states she feels more sob than usual. denies chest pain

## 2022-11-27 NOTE — ED CDU PROVIDER INITIAL DAY NOTE - OBJECTIVE STATEMENT
77-year-old female with past medical history of COPD not on home O2 +smoker, CAD status post stent, hyperlipidemia presenting for evaluation of unsteadiness over the last several weeks.  Symptoms are moderate.  No alleviating or aggravating factors.  States that symptoms are worse when she tries to walk feels like she cannot walk straight.  States that she was getting her hair colored yesterday and when she stood up she had to grab onto something to prevent her from falling.  No head injury or LOC.  No blurry vision, paresthesias, weakness, fevers, chills, chest pain, shortness of breath, abdominal pain, nausea, vomiting. Placed in OBS as per neuro recs for MRI brain

## 2022-11-28 LAB
CULTURE RESULTS: SIGNIFICANT CHANGE UP
FOLATE SERPL-MCNC: >20 NG/ML — SIGNIFICANT CHANGE UP
SPECIMEN SOURCE: SIGNIFICANT CHANGE UP
TSH SERPL-MCNC: 3.47 UIU/ML — SIGNIFICANT CHANGE UP (ref 0.27–4.2)
VIT B12 SERPL-MCNC: 608 PG/ML — SIGNIFICANT CHANGE UP (ref 232–1245)

## 2022-12-07 ENCOUNTER — APPOINTMENT (OUTPATIENT)
Age: 77
End: 2022-12-07

## 2022-12-07 VITALS
WEIGHT: 160 LBS | HEART RATE: 82 BPM | OXYGEN SATURATION: 91 % | RESPIRATION RATE: 14 BRPM | BODY MASS INDEX: 25.71 KG/M2 | SYSTOLIC BLOOD PRESSURE: 130 MMHG | HEIGHT: 66 IN | DIASTOLIC BLOOD PRESSURE: 76 MMHG

## 2022-12-07 PROCEDURE — 99213 OFFICE O/P EST LOW 20 MIN: CPT

## 2022-12-07 RX ORDER — FLUTICASONE FUROATE, UMECLIDINIUM BROMIDE AND VILANTEROL TRIFENATATE 100; 62.5; 25 UG/1; UG/1; UG/1
100-62.5-25 POWDER RESPIRATORY (INHALATION)
Qty: 60 | Refills: 5 | Status: ACTIVE | COMMUNITY
Start: 2022-12-07 | End: 1900-01-01

## 2022-12-14 ENCOUNTER — APPOINTMENT (OUTPATIENT)
Dept: NEUROSURGERY | Facility: CLINIC | Age: 77
End: 2022-12-14

## 2022-12-14 VITALS — SYSTOLIC BLOOD PRESSURE: 142 MMHG | DIASTOLIC BLOOD PRESSURE: 81 MMHG

## 2022-12-14 VITALS — WEIGHT: 157 LBS | BODY MASS INDEX: 26.16 KG/M2 | HEIGHT: 65 IN

## 2022-12-14 DIAGNOSIS — Z87.09 PERSONAL HISTORY OF OTHER DISEASES OF THE RESPIRATORY SYSTEM: ICD-10-CM

## 2022-12-14 DIAGNOSIS — Z63.4 DISAPPEARANCE AND DEATH OF FAMILY MEMBER: ICD-10-CM

## 2022-12-14 DIAGNOSIS — F17.200 NICOTINE DEPENDENCE, UNSPECIFIED, UNCOMPLICATED: ICD-10-CM

## 2022-12-14 DIAGNOSIS — Z86.79 PERSONAL HISTORY OF OTHER DISEASES OF THE CIRCULATORY SYSTEM: ICD-10-CM

## 2022-12-14 PROCEDURE — 99203 OFFICE O/P NEW LOW 30 MIN: CPT

## 2022-12-14 SDOH — SOCIAL STABILITY - SOCIAL INSECURITY: DISSAPEARANCE AND DEATH OF FAMILY MEMBER: Z63.4

## 2022-12-14 NOTE — HISTORY OF PRESENT ILLNESS
[de-identified] : 77-year-old RH pleasant female seen in the neurosurgery office today as a new patient for evaluation of an aneurysm, incidentally found during an ER visit. \par \par PMHx: COPD, no home oxygen; Pulmonologist Dr. Garcia\par +smoker, since the age of 14, less than 1/2 PPD\par \par ASA 81mg PO daily\par \par Patient presented to [facility] ER late November for feeling "off balance," CTA 11/27/2022 done and depicted L 4mm fusiform aneurysm. \par \par Ms. Tovar states that she still feels off balance at times, sporadically throughout the day but not a daily occurrence, denies falls. \par She ambulates steadily without any assistive devices. \par During the visit today the patient denies weakness to upper or lower extremities, bowel or bladder dysfunction, headaches, visual deficits or paresthesias. \par She also endorses that she experiences L "facial twitching" and was educated that this is a hemifacial spasm and can be treated with Botox injections or a surgical intervention, she opted to go for a consult for Botox and did not want to further discuss surgery. \par \par BP: 142/81 R arm manually assessed\par \par 11/27/2022 CTA head/neck with and without IV contrast:\par CTA HEAD: No intracranial vessel stenosis or occlusion.\par Of note there is a prominent vascular loop involving the left inferior M2 division of the MCA with fusiform aneurysmal dilation measuring up to 4 mm.\par \par CTA NECK: No evidence of carotid or vertebral artery stenosis.\par \par Plan is to repeat imaging in 6 months unless there is an acute change or a sudden concern of the patient. No surgical intervention warranted at this time. \par \par Physical Exam:\par Constitutional: Well appearing, no distress\par HEENT: Normocephalic Atraumatic\par Psychiatric: Awake, alert, oriented to person, place, situation and time. Normal affect. Follows commands. \par Language: Speech is clear, fluent with good repetition & comprehension. No dysarthria. \par Pulmonary: No respiratory distress\par \par Neurologic:\par CN II-XII grossly intact; EOMI with no nystagmus. No facial asymmetry bilaterally, full eye closure strength bilaterally. Hearing grossly normal. Head turning & shoulder shrug intact, bilaterally. Tongue midline, normal movements, no atrophy. Smile symmetrical. \par Palpation: no pain to palpation \par Strength: Full strength in all major muscle groups\par Sensation: Full sensation to light touch in all extremities, V1-V3 sensation bilaterally intact. \par Motor: No pronator drift, muscle strength of bilateral UE and bilateral LE: 5/5. Normal muscle tone. \par Reflexes: DTR of biceps, knee and ankle normal \par No Clonus\par No Wilson's\par No Romberg's\par \par ROM\par \par Gait: No postural instability. Normal stance and walking without assistance. \par

## 2022-12-14 NOTE — REVIEW OF SYSTEMS
[As Noted in HPI] : as noted in HPI [Seizures] : no convulsions [Difficulty Walking] : no difficulty walking [Frequent Falls] : not falling [Shortness Of Breath] : no shortness of breath [Negative] : Constitutional [FreeTextEntry6] : history of COPD, states that she checks her own oxygen with her pulse oximeter device at home

## 2023-02-02 ENCOUNTER — TRANSCRIPTION ENCOUNTER (OUTPATIENT)
Age: 78
End: 2023-02-02

## 2023-02-02 ENCOUNTER — APPOINTMENT (OUTPATIENT)
Age: 78
End: 2023-02-02
Payer: MEDICARE

## 2023-02-02 PROCEDURE — 99441: CPT

## 2023-03-01 ENCOUNTER — APPOINTMENT (OUTPATIENT)
Dept: NEUROLOGY | Facility: CLINIC | Age: 78
End: 2023-03-01
Payer: MEDICARE

## 2023-03-01 VITALS
DIASTOLIC BLOOD PRESSURE: 81 MMHG | WEIGHT: 157 LBS | HEART RATE: 71 BPM | SYSTOLIC BLOOD PRESSURE: 145 MMHG | BODY MASS INDEX: 25.23 KG/M2 | HEIGHT: 66 IN

## 2023-03-01 PROCEDURE — 99203 OFFICE O/P NEW LOW 30 MIN: CPT

## 2023-03-01 RX ORDER — CARBAMAZEPINE 100 MG/1
100 TABLET, CHEWABLE ORAL TWICE DAILY
Qty: 60 | Refills: 1 | Status: ACTIVE | COMMUNITY
Start: 2023-03-01 | End: 1900-01-01

## 2023-03-01 RX ORDER — CLOPIDOGREL BISULFATE 300 MG/1
TABLET, FILM COATED ORAL
Refills: 0 | Status: ACTIVE | COMMUNITY

## 2023-03-01 NOTE — HISTORY OF PRESENT ILLNESS
[FreeTextEntry1] : Mr. Tovar is a 77 year old man who was last seen by us in 2018 for Trigeminal Neuralgia  She states Saturday she felt electric shocks in her face. Today she reports of the same issue starting again this past Saturday. She states that it went away for the last three days but came back severe. She has recent imaging in her chart of an MRI which was normal.  in the past a short course steroid took away her pain.. she had mri of brain last time which did not show any intracranial pathology

## 2023-03-29 ENCOUNTER — APPOINTMENT (OUTPATIENT)
Dept: PULMONOLOGY | Facility: CLINIC | Age: 78
End: 2023-03-29
Payer: MEDICARE

## 2023-03-29 VITALS
DIASTOLIC BLOOD PRESSURE: 70 MMHG | RESPIRATION RATE: 12 BRPM | HEIGHT: 66 IN | SYSTOLIC BLOOD PRESSURE: 120 MMHG | BODY MASS INDEX: 25.71 KG/M2 | HEART RATE: 80 BPM | WEIGHT: 160 LBS | OXYGEN SATURATION: 94 %

## 2023-03-29 PROCEDURE — 99213 OFFICE O/P EST LOW 20 MIN: CPT

## 2023-03-29 NOTE — DISCUSSION/SUMMARY
[FreeTextEntry1] : COPD SMOKING COUNSELING/ ANORO WILL RENEW\par SOB ON EXERTION/ PFT\par CXR NOTED\par DO NOT WANT CHEST CT

## 2023-03-29 NOTE — HISTORY OF PRESENT ILLNESS
[TextBox_4] : COPD STILL ACTIVELY SMOKING SP RECENT ED VISIT FOR DIZZINESS, ? STROKE, LOW POX\par ON ANORO\par DECLINED CT CHEST

## 2023-04-06 ENCOUNTER — APPOINTMENT (OUTPATIENT)
Dept: NEUROLOGY | Facility: CLINIC | Age: 78
End: 2023-04-06

## 2023-09-19 ENCOUNTER — RX RENEWAL (OUTPATIENT)
Age: 78
End: 2023-09-19

## 2023-09-27 ENCOUNTER — APPOINTMENT (OUTPATIENT)
Dept: PULMONOLOGY | Facility: CLINIC | Age: 78
End: 2023-09-27
Payer: MEDICARE

## 2023-09-27 VITALS
HEART RATE: 82 BPM | DIASTOLIC BLOOD PRESSURE: 74 MMHG | SYSTOLIC BLOOD PRESSURE: 118 MMHG | OXYGEN SATURATION: 90 % | BODY MASS INDEX: 25.23 KG/M2 | WEIGHT: 157 LBS | HEIGHT: 66 IN

## 2023-09-27 PROCEDURE — 99213 OFFICE O/P EST LOW 20 MIN: CPT

## 2023-10-04 ENCOUNTER — OUTPATIENT (OUTPATIENT)
Dept: OUTPATIENT SERVICES | Facility: HOSPITAL | Age: 78
LOS: 1 days | End: 2023-10-04
Payer: MEDICARE

## 2023-10-04 DIAGNOSIS — Z90.710 ACQUIRED ABSENCE OF BOTH CERVIX AND UTERUS: Chronic | ICD-10-CM

## 2023-10-04 DIAGNOSIS — N63.10 UNSPECIFIED LUMP IN THE RIGHT BREAST, UNSPECIFIED QUADRANT: ICD-10-CM

## 2023-10-04 PROCEDURE — 77066 DX MAMMO INCL CAD BI: CPT

## 2023-10-04 PROCEDURE — G0279: CPT

## 2023-10-04 PROCEDURE — 76642 ULTRASOUND BREAST LIMITED: CPT | Mod: 26,RT

## 2023-10-04 PROCEDURE — 77066 DX MAMMO INCL CAD BI: CPT | Mod: 26

## 2023-10-04 PROCEDURE — 76642 ULTRASOUND BREAST LIMITED: CPT | Mod: RT

## 2023-10-04 PROCEDURE — G0279: CPT | Mod: 26

## 2023-10-05 ENCOUNTER — APPOINTMENT (OUTPATIENT)
Dept: NEUROLOGY | Facility: CLINIC | Age: 78
End: 2023-10-05
Payer: MEDICARE

## 2023-10-05 VITALS
BODY MASS INDEX: 25.23 KG/M2 | DIASTOLIC BLOOD PRESSURE: 86 MMHG | SYSTOLIC BLOOD PRESSURE: 142 MMHG | WEIGHT: 157 LBS | HEIGHT: 66 IN | HEART RATE: 80 BPM

## 2023-10-05 DIAGNOSIS — N63.10 UNSPECIFIED LUMP IN THE RIGHT BREAST, UNSPECIFIED QUADRANT: ICD-10-CM

## 2023-10-05 DIAGNOSIS — I67.1 CEREBRAL ANEURYSM, NONRUPTURED: ICD-10-CM

## 2023-10-05 DIAGNOSIS — G50.0 TRIGEMINAL NEURALGIA: ICD-10-CM

## 2023-10-05 PROCEDURE — 99214 OFFICE O/P EST MOD 30 MIN: CPT

## 2023-10-05 RX ORDER — PREDNISONE 20 MG/1
20 TABLET ORAL DAILY
Qty: 8 | Refills: 0 | Status: ACTIVE | COMMUNITY
Start: 2023-03-01 | End: 1900-01-01

## 2023-10-25 ENCOUNTER — NON-APPOINTMENT (OUTPATIENT)
Age: 78
End: 2023-10-25

## 2023-11-14 NOTE — ED ADULT NURSE NOTE - NS ED NURSE LEVEL OF CONSCIOUSNESS ORIENTATION
[address]  Dept: [phone]  Dept Fax: [phone]: [phone]     11/14/2023    Visit type: Follow up    Reason for Visit: Shoulder Pain (Pt states constant sharp pain and numbness in his Rt shoulder. States intense pain radiates from Rt shoulder down to his upper arm. States pain started 3-4 mos ago that gotten worse.)         Patient: Flaco Hicks is a 75 y.o. male     HPI: 76-year-old male presents with right shoulder pain has been ongoing for the last 3 to 4 months. Patient states he has significant difficulty with lifting his arm overhead or away from his body. Patient states the pain did start suddenly though he cannot recall what caused it to start as it was a couple months ago. He has tried oral medications without any significant improvement. Patient does take a blood thinner so cannot take NSAIDs. Patient states he has significant pain not only with activities, but also when he lays on that side. ASSESSMENT/PLAN   1. Rotator cuff tendinitis, right  -     methylPREDNISolone (MEDROL DOSEPACK) 4 MG tablet; Take by mouth., Disp-1 kit, R-0Normal  -     lidocaine 1 % injection 2 mL; 2 mL, Intra-artICUlar, ONCE, 1 dose, On Tue 11/14/23 at 1515  -     triamcinolone acetonide (KENALOG-40) injection 80 mg; 80 mg, Intra-artICUlar, ONCE, 1 dose, On Tue 11/14/23 at 1515  -     Ambulatory referral to Physical Therapy  2. Subacromial bursitis of right shoulder joint  -     methylPREDNISolone (MEDROL DOSEPACK) 4 MG tablet; Take by mouth., Disp-1 kit, R-0Normal  -     lidocaine 1 % injection 2 mL; 2 mL, Intra-artICUlar, ONCE, 1 dose, On Tue 11/14/23 at 1515  -     triamcinolone acetonide (KENALOG-40) injection 80 mg; 80 mg, Intra-artICUlar, ONCE, 1 dose, On Tue 11/14/23 at 1515  -     Ambulatory referral to Physical Therapy  3.  Arthritis of right glenohumeral joint  -
Oriented - self; Oriented - place; Oriented - time

## 2023-11-21 ENCOUNTER — RX RENEWAL (OUTPATIENT)
Age: 78
End: 2023-11-21

## 2023-11-21 RX ORDER — UMECLIDINIUM BROMIDE AND VILANTEROL TRIFENATATE 62.5; 25 UG/1; UG/1
62.5-25 POWDER RESPIRATORY (INHALATION)
Qty: 60 | Refills: 5 | Status: ACTIVE | COMMUNITY
Start: 2022-01-05 | End: 1900-01-01

## 2024-03-07 ENCOUNTER — EMERGENCY (EMERGENCY)
Facility: HOSPITAL | Age: 79
LOS: 0 days | Discharge: HOME | End: 2024-03-07
Attending: EMERGENCY MEDICINE
Payer: MEDICARE

## 2024-03-07 VITALS
OXYGEN SATURATION: 95 % | RESPIRATION RATE: 19 BRPM | HEART RATE: 91 BPM | SYSTOLIC BLOOD PRESSURE: 187 MMHG | TEMPERATURE: 98 F | DIASTOLIC BLOOD PRESSURE: 89 MMHG

## 2024-03-07 DIAGNOSIS — J44.9 CHRONIC OBSTRUCTIVE PULMONARY DISEASE, UNSPECIFIED: ICD-10-CM

## 2024-03-07 DIAGNOSIS — Z90.710 ACQUIRED ABSENCE OF BOTH CERVIX AND UTERUS: Chronic | ICD-10-CM

## 2024-03-07 DIAGNOSIS — R53.1 WEAKNESS: ICD-10-CM

## 2024-03-07 LAB
ALBUMIN SERPL ELPH-MCNC: 4.3 G/DL — SIGNIFICANT CHANGE UP (ref 3.5–5.2)
ALP SERPL-CCNC: 101 U/L — SIGNIFICANT CHANGE UP (ref 30–115)
ALT FLD-CCNC: 18 U/L — SIGNIFICANT CHANGE UP (ref 0–41)
ANION GAP SERPL CALC-SCNC: 10 MMOL/L — SIGNIFICANT CHANGE UP (ref 7–14)
APPEARANCE UR: ABNORMAL
AST SERPL-CCNC: 25 U/L — SIGNIFICANT CHANGE UP (ref 0–41)
BACTERIA # UR AUTO: ABNORMAL /HPF
BASOPHILS # BLD AUTO: 0.06 K/UL — SIGNIFICANT CHANGE UP (ref 0–0.2)
BASOPHILS NFR BLD AUTO: 0.7 % — SIGNIFICANT CHANGE UP (ref 0–1)
BILIRUB SERPL-MCNC: 0.9 MG/DL — SIGNIFICANT CHANGE UP (ref 0.2–1.2)
BILIRUB UR-MCNC: NEGATIVE — SIGNIFICANT CHANGE UP
BUN SERPL-MCNC: 9 MG/DL — LOW (ref 10–20)
CALCIUM SERPL-MCNC: 9.5 MG/DL — SIGNIFICANT CHANGE UP (ref 8.4–10.5)
CAST: 2 /LPF — SIGNIFICANT CHANGE UP (ref 0–4)
CHLORIDE SERPL-SCNC: 104 MMOL/L — SIGNIFICANT CHANGE UP (ref 98–110)
CO2 SERPL-SCNC: 29 MMOL/L — SIGNIFICANT CHANGE UP (ref 17–32)
COLOR SPEC: YELLOW — SIGNIFICANT CHANGE UP
CREAT SERPL-MCNC: 0.6 MG/DL — LOW (ref 0.7–1.5)
DIFF PNL FLD: NEGATIVE — SIGNIFICANT CHANGE UP
EGFR: 91 ML/MIN/1.73M2 — SIGNIFICANT CHANGE UP
EOSINOPHIL # BLD AUTO: 0.14 K/UL — SIGNIFICANT CHANGE UP (ref 0–0.7)
EOSINOPHIL NFR BLD AUTO: 1.7 % — SIGNIFICANT CHANGE UP (ref 0–8)
GLUCOSE SERPL-MCNC: 110 MG/DL — HIGH (ref 70–99)
GLUCOSE UR QL: NEGATIVE MG/DL — SIGNIFICANT CHANGE UP
HCT VFR BLD CALC: 45.8 % — SIGNIFICANT CHANGE UP (ref 37–47)
HGB BLD-MCNC: 15.1 G/DL — SIGNIFICANT CHANGE UP (ref 12–16)
IMM GRANULOCYTES NFR BLD AUTO: 0.6 % — HIGH (ref 0.1–0.3)
KETONES UR-MCNC: ABNORMAL MG/DL
LEUKOCYTE ESTERASE UR-ACNC: ABNORMAL
LYMPHOCYTES # BLD AUTO: 1.85 K/UL — SIGNIFICANT CHANGE UP (ref 1.2–3.4)
LYMPHOCYTES # BLD AUTO: 22.8 % — SIGNIFICANT CHANGE UP (ref 20.5–51.1)
MCHC RBC-ENTMCNC: 30.8 PG — SIGNIFICANT CHANGE UP (ref 27–31)
MCHC RBC-ENTMCNC: 33 G/DL — SIGNIFICANT CHANGE UP (ref 32–37)
MCV RBC AUTO: 93.5 FL — SIGNIFICANT CHANGE UP (ref 81–99)
MONOCYTES # BLD AUTO: 0.63 K/UL — HIGH (ref 0.1–0.6)
MONOCYTES NFR BLD AUTO: 7.8 % — SIGNIFICANT CHANGE UP (ref 1.7–9.3)
NEUTROPHILS # BLD AUTO: 5.39 K/UL — SIGNIFICANT CHANGE UP (ref 1.4–6.5)
NEUTROPHILS NFR BLD AUTO: 66.4 % — SIGNIFICANT CHANGE UP (ref 42.2–75.2)
NITRITE UR-MCNC: NEGATIVE — SIGNIFICANT CHANGE UP
NRBC # BLD: 0 /100 WBCS — SIGNIFICANT CHANGE UP (ref 0–0)
PH UR: 5.5 — SIGNIFICANT CHANGE UP (ref 5–8)
PLATELET # BLD AUTO: 274 K/UL — SIGNIFICANT CHANGE UP (ref 130–400)
PMV BLD: 10.6 FL — HIGH (ref 7.4–10.4)
POTASSIUM SERPL-MCNC: 4.4 MMOL/L — SIGNIFICANT CHANGE UP (ref 3.5–5)
POTASSIUM SERPL-SCNC: 4.4 MMOL/L — SIGNIFICANT CHANGE UP (ref 3.5–5)
PROT SERPL-MCNC: 6.9 G/DL — SIGNIFICANT CHANGE UP (ref 6–8)
PROT UR-MCNC: NEGATIVE MG/DL — SIGNIFICANT CHANGE UP
RBC # BLD: 4.9 M/UL — SIGNIFICANT CHANGE UP (ref 4.2–5.4)
RBC # FLD: 13.3 % — SIGNIFICANT CHANGE UP (ref 11.5–14.5)
RBC CASTS # UR COMP ASSIST: 7 /HPF — HIGH (ref 0–4)
SODIUM SERPL-SCNC: 143 MMOL/L — SIGNIFICANT CHANGE UP (ref 135–146)
SP GR SPEC: 1.02 — SIGNIFICANT CHANGE UP (ref 1–1.03)
SQUAMOUS # UR AUTO: 16 /HPF — HIGH (ref 0–5)
UROBILINOGEN FLD QL: 0.2 MG/DL — SIGNIFICANT CHANGE UP (ref 0.2–1)
WBC # BLD: 8.12 K/UL — SIGNIFICANT CHANGE UP (ref 4.8–10.8)
WBC # FLD AUTO: 8.12 K/UL — SIGNIFICANT CHANGE UP (ref 4.8–10.8)
WBC UR QL: 21 /HPF — HIGH (ref 0–5)

## 2024-03-07 PROCEDURE — 36415 COLL VENOUS BLD VENIPUNCTURE: CPT

## 2024-03-07 PROCEDURE — 85025 COMPLETE CBC W/AUTO DIFF WBC: CPT

## 2024-03-07 PROCEDURE — 80053 COMPREHEN METABOLIC PANEL: CPT

## 2024-03-07 PROCEDURE — 70450 CT HEAD/BRAIN W/O DYE: CPT | Mod: MC

## 2024-03-07 PROCEDURE — 99284 EMERGENCY DEPT VISIT MOD MDM: CPT

## 2024-03-07 PROCEDURE — 70450 CT HEAD/BRAIN W/O DYE: CPT | Mod: 26,MC

## 2024-03-07 PROCEDURE — 93005 ELECTROCARDIOGRAM TRACING: CPT

## 2024-03-07 PROCEDURE — 81001 URINALYSIS AUTO W/SCOPE: CPT

## 2024-03-07 PROCEDURE — 99285 EMERGENCY DEPT VISIT HI MDM: CPT | Mod: 25

## 2024-03-07 PROCEDURE — 93010 ELECTROCARDIOGRAM REPORT: CPT

## 2024-03-07 PROCEDURE — 71046 X-RAY EXAM CHEST 2 VIEWS: CPT | Mod: 26

## 2024-03-07 PROCEDURE — 87086 URINE CULTURE/COLONY COUNT: CPT

## 2024-03-07 PROCEDURE — 71046 X-RAY EXAM CHEST 2 VIEWS: CPT

## 2024-03-07 RX ORDER — CEFPODOXIME PROXETIL 100 MG
1 TABLET ORAL
Qty: 20 | Refills: 0
Start: 2024-03-07 | End: 2024-03-16

## 2024-03-07 RX ORDER — SODIUM CHLORIDE 9 MG/ML
1000 INJECTION INTRAMUSCULAR; INTRAVENOUS; SUBCUTANEOUS ONCE
Refills: 0 | Status: COMPLETED | OUTPATIENT
Start: 2024-03-07 | End: 2024-03-07

## 2024-03-07 RX ADMIN — SODIUM CHLORIDE 2000 MILLILITER(S): 9 INJECTION INTRAMUSCULAR; INTRAVENOUS; SUBCUTANEOUS at 17:00

## 2024-03-07 NOTE — ED PROVIDER NOTE - IV ALTEPLASE EXCL REL HIDDEN
61 year old male hx of rectal adenocarcinoma 2019 s/p resection with creation of ileostomy 2/2020   s/p chemo and XRT; Found to have local recurrence during sigmoidoscopy in preparation for reversal of ileostomy  presents to ED and c.o 1 day of not emptying the fecal on her colostomy bag  and on epigastric/ parumbilical since 8 pm last night . states he tried to take juice / fluids and he vomited x 1 NBNB . denies any fever or chills. cough , SOB or chest pain  diarrhea. show

## 2024-03-07 NOTE — ED PROVIDER NOTE - PHYSICAL EXAMINATION
VITAL SIGNS: I have reviewed nursing notes and confirm.  CONSTITUTIONAL:  in no acute distress.  SKIN: Skin exam is warm and dry, no acute rash.  HEAD: Normocephalic; atraumatic.  EYES: PERRL, EOM intact; conjunctiva and sclera clear.  ENT: No nasal discharge; airway clear  NECK: Supple; non tender.  CARD: S1, S2 normal; no murmurs, gallops, or rubs. Regular rate and rhythm.  RESP: No wheezes, rales or rhonchi. Speaking in full sentences.   ABD:  soft; non-distended; non-tender; No rebound or guarding  EXT: Normal ROM. No clubbing, cyanosis or edema.  NEURO: Alert, oriented. Grossly unremarkable. No focal deficits.

## 2024-03-07 NOTE — ED PROVIDER NOTE - OBJECTIVE STATEMENT
78-year-old female history of COPD, diffuse shingles presenting to ED for evaluation of weakness. Patient states for the last week she is felt wobbly on her feet which became worse today.  Denies fever, chills, N, V, CP, SOB, back pain, numbness/tingling to lower extremities

## 2024-03-07 NOTE — ED PROVIDER NOTE - PATIENT PORTAL LINK FT
You can access the FollowMyHealth Patient Portal offered by [facility] by registering at the following website: [email or web address]. By joining [employer]’s FollowMyHealth portal, you will also be able to view your health information using other applications (apps) compatible with our system.

## 2024-03-07 NOTE — ED PROVIDER NOTE - CLINICAL SUMMARY MEDICAL DECISION MAKING FREE TEXT BOX
Patient reports no complaints in the ED.  Labs, Imaging and EKG reassuring.  Neuro non focal on reassessement.  DC home.  Strict return instructions discussed.

## 2024-03-07 NOTE — ED ADULT NURSE NOTE - NSFALLRISKINTERV_ED_ALL_ED

## 2024-03-09 LAB
CULTURE RESULTS: SIGNIFICANT CHANGE UP
SPECIMEN SOURCE: SIGNIFICANT CHANGE UP

## 2024-03-27 ENCOUNTER — APPOINTMENT (OUTPATIENT)
Dept: PULMONOLOGY | Facility: CLINIC | Age: 79
End: 2024-03-27
Payer: MEDICARE

## 2024-03-27 VITALS
HEART RATE: 87 BPM | OXYGEN SATURATION: 88 % | BODY MASS INDEX: 24.11 KG/M2 | SYSTOLIC BLOOD PRESSURE: 140 MMHG | DIASTOLIC BLOOD PRESSURE: 80 MMHG | WEIGHT: 150 LBS | HEIGHT: 66 IN

## 2024-03-27 PROCEDURE — G2211 COMPLEX E/M VISIT ADD ON: CPT

## 2024-03-27 PROCEDURE — 99213 OFFICE O/P EST LOW 20 MIN: CPT

## 2024-03-27 NOTE — DISCUSSION/SUMMARY
[FreeTextEntry1] : COPD SMOKING COUNSELING/ ANORO  SOB ON EXERTION/ PFT CXR NOTED CHEST CT SMOKING COUSELING HER POX WAS 88% RA AT REST, PATIENT MOBILE AT HOME WILL ORDER INOGEN TYPE OXYGEN

## 2024-04-12 NOTE — DISCHARGE NOTE PROVIDER - NSDCQMSTAIRS_GEN_ALL_CORE
Subjective  Chief Complaint   Patient presents with    1 Month Follow-Up    Depression     Pt. States that the Prozac is helping and working    Allergies     Pt. Stated that the Claritin is not working and still having symptoms, sneezing everyday constantly, congestion from sneezing    Hand Injury     Pt. Stated that she broke her RT hand middle finger, went to the ER, splint it for 2 weeks, has an appt for ortho doesn't like him would like to discuss this with you       HPI    Allergies- taking claritin. Nasal congestion. Sneezing a lot. Not helping a ton. Asking what else she can do?     About a month ago, she broke her middle finger per the ER. Although imaging read as normal.   Still has a lot of swelling and cannot move. Doesn't seem to be improving. Wore brace for two weeks.  Has appt with ortho this afternoon    Depression- prozac seems to be helping with symptoms      Patient Active Problem List    Diagnosis Date Noted    Bloated abdomen 01/21/2021    Generalized abdominal pain 01/21/2021    Cyst of left ovary 01/21/2021    Bipolar affective disorder (HCC) 08/28/2018    Pelvic pain in female 02/15/2018    Acanthosis nigricans 04/26/2016    Insulin resistance 04/26/2016    Morbid obesity due to excess calories (HCC) 04/26/2016    Bronchitis     Pain in left foot 09/01/2015    Central perforation of tympanic membrane 05/07/2010    Chronic myringitis 06/10/2008    Chronic tubotympanic suppurative otitis media 06/10/2008    Simple chronic serous otitis media 01/10/2007    Cholesteatoma 11/10/2006    Cholesteatoma of middle ear and mastoid 10/31/2006    Acute suppurative otitis media without spontaneous rupture of ear drum 02/24/2005    Allergic rhinitis due to pollen 11/03/2004    Dysfunction of eustachian tube 11/03/2004    Impacted cerumen 11/03/2004    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

## 2024-06-03 ENCOUNTER — INPATIENT (INPATIENT)
Facility: HOSPITAL | Age: 79
LOS: 3 days | Discharge: HOME | DRG: 189 | End: 2024-06-07
Attending: INTERNAL MEDICINE | Admitting: STUDENT IN AN ORGANIZED HEALTH CARE EDUCATION/TRAINING PROGRAM
Payer: MEDICARE

## 2024-06-03 VITALS
HEART RATE: 97 BPM | SYSTOLIC BLOOD PRESSURE: 97 MMHG | OXYGEN SATURATION: 92 % | DIASTOLIC BLOOD PRESSURE: 63 MMHG | RESPIRATION RATE: 18 BRPM | TEMPERATURE: 100 F

## 2024-06-03 DIAGNOSIS — J44.1 CHRONIC OBSTRUCTIVE PULMONARY DISEASE WITH (ACUTE) EXACERBATION: ICD-10-CM

## 2024-06-03 DIAGNOSIS — Z90.710 ACQUIRED ABSENCE OF BOTH CERVIX AND UTERUS: Chronic | ICD-10-CM

## 2024-06-03 LAB
ALBUMIN SERPL ELPH-MCNC: 4 G/DL — SIGNIFICANT CHANGE UP (ref 3.5–5.2)
ALP SERPL-CCNC: 155 U/L — HIGH (ref 30–115)
ALT FLD-CCNC: 46 U/L — HIGH (ref 0–41)
ANION GAP SERPL CALC-SCNC: 15 MMOL/L — HIGH (ref 7–14)
APPEARANCE UR: CLEAR — SIGNIFICANT CHANGE UP
APTT BLD: 30.3 SEC — SIGNIFICANT CHANGE UP (ref 27–39.2)
AST SERPL-CCNC: 76 U/L — HIGH (ref 0–41)
BACTERIA # UR AUTO: NEGATIVE /HPF — SIGNIFICANT CHANGE UP
BASE EXCESS BLDV CALC-SCNC: 0.5 MMOL/L — SIGNIFICANT CHANGE UP (ref -2–3)
BASOPHILS # BLD AUTO: 0.07 K/UL — SIGNIFICANT CHANGE UP (ref 0–0.2)
BASOPHILS NFR BLD AUTO: 0.5 % — SIGNIFICANT CHANGE UP (ref 0–1)
BILIRUB SERPL-MCNC: 1.5 MG/DL — HIGH (ref 0.2–1.2)
BILIRUB UR-MCNC: ABNORMAL
BUN SERPL-MCNC: 14 MG/DL — SIGNIFICANT CHANGE UP (ref 10–20)
CA-I SERPL-SCNC: 1.21 MMOL/L — SIGNIFICANT CHANGE UP (ref 1.15–1.33)
CALCIUM SERPL-MCNC: 9.4 MG/DL — SIGNIFICANT CHANGE UP (ref 8.4–10.5)
CAST: 2 /LPF — SIGNIFICANT CHANGE UP (ref 0–4)
CHLORIDE SERPL-SCNC: 96 MMOL/L — LOW (ref 98–110)
CO2 SERPL-SCNC: 23 MMOL/L — SIGNIFICANT CHANGE UP (ref 17–32)
COLOR SPEC: ABNORMAL
CREAT SERPL-MCNC: 0.8 MG/DL — SIGNIFICANT CHANGE UP (ref 0.7–1.5)
DIFF PNL FLD: NEGATIVE — SIGNIFICANT CHANGE UP
EGFR: 74 ML/MIN/1.73M2 — SIGNIFICANT CHANGE UP
EOSINOPHIL # BLD AUTO: 0.03 K/UL — SIGNIFICANT CHANGE UP (ref 0–0.7)
EOSINOPHIL NFR BLD AUTO: 0.2 % — SIGNIFICANT CHANGE UP (ref 0–8)
FLUAV AG NPH QL: SIGNIFICANT CHANGE UP
FLUBV AG NPH QL: SIGNIFICANT CHANGE UP
GAS PNL BLDV: 131 MMOL/L — LOW (ref 136–145)
GAS PNL BLDV: SIGNIFICANT CHANGE UP
GLUCOSE BLDC GLUCOMTR-MCNC: 262 MG/DL — HIGH (ref 70–99)
GLUCOSE SERPL-MCNC: 109 MG/DL — HIGH (ref 70–99)
GLUCOSE UR QL: NEGATIVE MG/DL — SIGNIFICANT CHANGE UP
HCO3 BLDV-SCNC: 28 MMOL/L — SIGNIFICANT CHANGE UP (ref 22–29)
HCT VFR BLD CALC: 52.6 % — HIGH (ref 37–47)
HCT VFR BLDA CALC: 47 % — HIGH (ref 34.5–46.5)
HGB BLD CALC-MCNC: 15.8 G/DL — SIGNIFICANT CHANGE UP (ref 11.7–16.1)
HGB BLD-MCNC: 16.7 G/DL — HIGH (ref 12–16)
IMM GRANULOCYTES NFR BLD AUTO: 0.4 % — HIGH (ref 0.1–0.3)
INR BLD: 1.29 RATIO — SIGNIFICANT CHANGE UP (ref 0.65–1.3)
KETONES UR-MCNC: ABNORMAL MG/DL
LACTATE BLDV-MCNC: 0.8 MMOL/L — SIGNIFICANT CHANGE UP (ref 0.5–2)
LEUKOCYTE ESTERASE UR-ACNC: ABNORMAL
LYMPHOCYTES # BLD AUTO: 1.48 K/UL — SIGNIFICANT CHANGE UP (ref 1.2–3.4)
LYMPHOCYTES # BLD AUTO: 10.3 % — LOW (ref 20.5–51.1)
MCHC RBC-ENTMCNC: 29.7 PG — SIGNIFICANT CHANGE UP (ref 27–31)
MCHC RBC-ENTMCNC: 31.7 G/DL — LOW (ref 32–37)
MCV RBC AUTO: 93.6 FL — SIGNIFICANT CHANGE UP (ref 81–99)
MONOCYTES # BLD AUTO: 0.99 K/UL — HIGH (ref 0.1–0.6)
MONOCYTES NFR BLD AUTO: 6.9 % — SIGNIFICANT CHANGE UP (ref 1.7–9.3)
NEUTROPHILS # BLD AUTO: 11.73 K/UL — HIGH (ref 1.4–6.5)
NEUTROPHILS NFR BLD AUTO: 81.7 % — HIGH (ref 42.2–75.2)
NITRITE UR-MCNC: NEGATIVE — SIGNIFICANT CHANGE UP
NRBC # BLD: 0 /100 WBCS — SIGNIFICANT CHANGE UP (ref 0–0)
PCO2 BLDV: 58 MMHG — HIGH (ref 39–42)
PH BLDV: 7.3 — LOW (ref 7.32–7.43)
PH UR: 5.5 — SIGNIFICANT CHANGE UP (ref 5–8)
PLATELET # BLD AUTO: 174 K/UL — SIGNIFICANT CHANGE UP (ref 130–400)
PMV BLD: 12.3 FL — HIGH (ref 7.4–10.4)
PO2 BLDV: 74 MMHG — HIGH (ref 25–45)
POTASSIUM BLDV-SCNC: 4.4 MMOL/L — SIGNIFICANT CHANGE UP (ref 3.5–5.1)
POTASSIUM SERPL-MCNC: SIGNIFICANT CHANGE UP MMOL/L (ref 3.5–5)
POTASSIUM SERPL-SCNC: SIGNIFICANT CHANGE UP MMOL/L (ref 3.5–5)
PROT SERPL-MCNC: 7.6 G/DL — SIGNIFICANT CHANGE UP (ref 6–8)
PROT UR-MCNC: 30 MG/DL
PROTHROM AB SERPL-ACNC: 14.7 SEC — HIGH (ref 9.95–12.87)
RBC # BLD: 5.62 M/UL — HIGH (ref 4.2–5.4)
RBC # FLD: 13.4 % — SIGNIFICANT CHANGE UP (ref 11.5–14.5)
RBC CASTS # UR COMP ASSIST: 4 /HPF — SIGNIFICANT CHANGE UP (ref 0–4)
RSV RNA NPH QL NAA+NON-PROBE: SIGNIFICANT CHANGE UP
SAO2 % BLDV: 95.3 % — HIGH (ref 67–88)
SARS-COV-2 RNA SPEC QL NAA+PROBE: SIGNIFICANT CHANGE UP
SODIUM SERPL-SCNC: 134 MMOL/L — LOW (ref 135–146)
SP GR SPEC: 1.02 — SIGNIFICANT CHANGE UP (ref 1–1.03)
SQUAMOUS # UR AUTO: 4 /HPF — SIGNIFICANT CHANGE UP (ref 0–5)
UROBILINOGEN FLD QL: 1 MG/DL — SIGNIFICANT CHANGE UP (ref 0.2–1)
WBC # BLD: 14.36 K/UL — HIGH (ref 4.8–10.8)
WBC # FLD AUTO: 14.36 K/UL — HIGH (ref 4.8–10.8)
WBC UR QL: 2 /HPF — SIGNIFICANT CHANGE UP (ref 0–5)

## 2024-06-03 PROCEDURE — 83036 HEMOGLOBIN GLYCOSYLATED A1C: CPT

## 2024-06-03 PROCEDURE — 82746 ASSAY OF FOLIC ACID SERUM: CPT

## 2024-06-03 PROCEDURE — 92611 MOTION FLUOROSCOPY/SWALLOW: CPT | Mod: GN

## 2024-06-03 PROCEDURE — 86901 BLOOD TYPING SEROLOGIC RH(D): CPT

## 2024-06-03 PROCEDURE — 86140 C-REACTIVE PROTEIN: CPT

## 2024-06-03 PROCEDURE — 84100 ASSAY OF PHOSPHORUS: CPT

## 2024-06-03 PROCEDURE — 97116 GAIT TRAINING THERAPY: CPT | Mod: GP

## 2024-06-03 PROCEDURE — 82962 GLUCOSE BLOOD TEST: CPT

## 2024-06-03 PROCEDURE — 92526 ORAL FUNCTION THERAPY: CPT | Mod: GN

## 2024-06-03 PROCEDURE — 85652 RBC SED RATE AUTOMATED: CPT

## 2024-06-03 PROCEDURE — 99285 EMERGENCY DEPT VISIT HI MDM: CPT

## 2024-06-03 PROCEDURE — 83735 ASSAY OF MAGNESIUM: CPT

## 2024-06-03 PROCEDURE — 85027 COMPLETE CBC AUTOMATED: CPT

## 2024-06-03 PROCEDURE — 86317 IMMUNOASSAY INFECTIOUS AGENT: CPT

## 2024-06-03 PROCEDURE — 94660 CPAP INITIATION&MGMT: CPT

## 2024-06-03 PROCEDURE — 84145 PROCALCITONIN (PCT): CPT

## 2024-06-03 PROCEDURE — 86900 BLOOD TYPING SEROLOGIC ABO: CPT

## 2024-06-03 PROCEDURE — 70450 CT HEAD/BRAIN W/O DYE: CPT | Mod: 26,MC

## 2024-06-03 PROCEDURE — 36415 COLL VENOUS BLD VENIPUNCTURE: CPT

## 2024-06-03 PROCEDURE — 93010 ELECTROCARDIOGRAM REPORT: CPT

## 2024-06-03 PROCEDURE — 83605 ASSAY OF LACTIC ACID: CPT

## 2024-06-03 PROCEDURE — 86609 BACTERIUM ANTIBODY: CPT

## 2024-06-03 PROCEDURE — 97162 PT EVAL MOD COMPLEX 30 MIN: CPT | Mod: GP

## 2024-06-03 PROCEDURE — 80053 COMPREHEN METABOLIC PANEL: CPT

## 2024-06-03 PROCEDURE — 80048 BASIC METABOLIC PNL TOTAL CA: CPT

## 2024-06-03 PROCEDURE — 82977 ASSAY OF GGT: CPT

## 2024-06-03 PROCEDURE — 71045 X-RAY EXAM CHEST 1 VIEW: CPT | Mod: 26

## 2024-06-03 PROCEDURE — 82607 VITAMIN B-12: CPT

## 2024-06-03 PROCEDURE — 82803 BLOOD GASES ANY COMBINATION: CPT

## 2024-06-03 PROCEDURE — 97110 THERAPEUTIC EXERCISES: CPT | Mod: GP

## 2024-06-03 PROCEDURE — 74230 X-RAY XM SWLNG FUNCJ C+: CPT

## 2024-06-03 PROCEDURE — 92610 EVALUATE SWALLOWING FUNCTION: CPT | Mod: GN

## 2024-06-03 PROCEDURE — 94640 AIRWAY INHALATION TREATMENT: CPT

## 2024-06-03 PROCEDURE — 80074 ACUTE HEPATITIS PANEL: CPT

## 2024-06-03 PROCEDURE — 85025 COMPLETE CBC W/AUTO DIFF WBC: CPT

## 2024-06-03 PROCEDURE — 74177 CT ABD & PELVIS W/CONTRAST: CPT | Mod: 26,MC

## 2024-06-03 PROCEDURE — 86850 RBC ANTIBODY SCREEN: CPT

## 2024-06-03 PROCEDURE — 92612 ENDOSCOPY SWALLOW (FEES) VID: CPT | Mod: GN

## 2024-06-03 RX ORDER — SODIUM CHLORIDE 9 MG/ML
2000 INJECTION, SOLUTION INTRAVENOUS ONCE
Refills: 0 | Status: COMPLETED | OUTPATIENT
Start: 2024-06-03 | End: 2024-06-03

## 2024-06-03 RX ORDER — CEFTRIAXONE 500 MG/1
1000 INJECTION, POWDER, FOR SOLUTION INTRAMUSCULAR; INTRAVENOUS ONCE
Refills: 0 | Status: COMPLETED | OUTPATIENT
Start: 2024-06-03 | End: 2024-06-03

## 2024-06-03 RX ORDER — ENOXAPARIN SODIUM 100 MG/ML
40 INJECTION SUBCUTANEOUS EVERY 24 HOURS
Refills: 0 | Status: DISCONTINUED | OUTPATIENT
Start: 2024-06-03 | End: 2024-06-07

## 2024-06-03 RX ORDER — ALBUTEROL 90 UG/1
2 AEROSOL, METERED ORAL EVERY 6 HOURS
Refills: 0 | Status: DISCONTINUED | OUTPATIENT
Start: 2024-06-03 | End: 2024-06-07

## 2024-06-03 RX ORDER — IPRATROPIUM/ALBUTEROL SULFATE 18-103MCG
3 AEROSOL WITH ADAPTER (GRAM) INHALATION EVERY 6 HOURS
Refills: 0 | Status: DISCONTINUED | OUTPATIENT
Start: 2024-06-03 | End: 2024-06-07

## 2024-06-03 RX ORDER — AMLODIPINE BESYLATE 2.5 MG/1
5 TABLET ORAL
Qty: 0 | Refills: 0 | DISCHARGE

## 2024-06-03 RX ORDER — AZITHROMYCIN 500 MG/1
500 TABLET, FILM COATED ORAL ONCE
Refills: 0 | Status: COMPLETED | OUTPATIENT
Start: 2024-06-03 | End: 2024-06-03

## 2024-06-03 RX ORDER — AZITHROMYCIN 500 MG/1
500 TABLET, FILM COATED ORAL EVERY 24 HOURS
Refills: 0 | Status: DISCONTINUED | OUTPATIENT
Start: 2024-06-04 | End: 2024-06-04

## 2024-06-03 RX ORDER — AZITHROMYCIN 500 MG/1
TABLET, FILM COATED ORAL
Refills: 0 | Status: DISCONTINUED | OUTPATIENT
Start: 2024-06-03 | End: 2024-06-04

## 2024-06-03 RX ORDER — AMLODIPINE BESYLATE 2.5 MG/1
10 TABLET ORAL DAILY
Refills: 0 | Status: DISCONTINUED | OUTPATIENT
Start: 2024-06-03 | End: 2024-06-06

## 2024-06-03 RX ORDER — MAGNESIUM SULFATE 500 MG/ML
2 VIAL (ML) INJECTION ONCE
Refills: 0 | Status: COMPLETED | OUTPATIENT
Start: 2024-06-03 | End: 2024-06-03

## 2024-06-03 RX ORDER — DEXAMETHASONE 0.5 MG/5ML
10 ELIXIR ORAL ONCE
Refills: 0 | Status: COMPLETED | OUTPATIENT
Start: 2024-06-03 | End: 2024-06-03

## 2024-06-03 RX ORDER — PANTOPRAZOLE SODIUM 20 MG/1
40 TABLET, DELAYED RELEASE ORAL
Refills: 0 | Status: DISCONTINUED | OUTPATIENT
Start: 2024-06-03 | End: 2024-06-07

## 2024-06-03 RX ORDER — AZITHROMYCIN 500 MG/1
500 TABLET, FILM COATED ORAL EVERY 24 HOURS
Refills: 0 | Status: DISCONTINUED | OUTPATIENT
Start: 2024-06-03 | End: 2024-06-03

## 2024-06-03 RX ORDER — IPRATROPIUM/ALBUTEROL SULFATE 18-103MCG
3 AEROSOL WITH ADAPTER (GRAM) INHALATION
Refills: 0 | Status: COMPLETED | OUTPATIENT
Start: 2024-06-03 | End: 2024-06-03

## 2024-06-03 RX ORDER — ASPIRIN/CALCIUM CARB/MAGNESIUM 324 MG
81 TABLET ORAL DAILY
Refills: 0 | Status: DISCONTINUED | OUTPATIENT
Start: 2024-06-03 | End: 2024-06-07

## 2024-06-03 RX ORDER — BUDESONIDE AND FORMOTEROL FUMARATE DIHYDRATE 160; 4.5 UG/1; UG/1
2 AEROSOL RESPIRATORY (INHALATION)
Refills: 0 | Status: DISCONTINUED | OUTPATIENT
Start: 2024-06-03 | End: 2024-06-07

## 2024-06-03 RX ADMIN — Medication 3 MILLILITER(S): at 16:57

## 2024-06-03 RX ADMIN — AZITHROMYCIN 255 MILLIGRAM(S): 500 TABLET, FILM COATED ORAL at 20:43

## 2024-06-03 RX ADMIN — AZITHROMYCIN 255 MILLIGRAM(S): 500 TABLET, FILM COATED ORAL at 23:53

## 2024-06-03 RX ADMIN — Medication 102 MILLIGRAM(S): at 16:57

## 2024-06-03 RX ADMIN — Medication 150 GRAM(S): at 16:57

## 2024-06-03 RX ADMIN — Medication 3 MILLILITER(S): at 17:19

## 2024-06-03 RX ADMIN — Medication 40 MILLIGRAM(S): at 23:53

## 2024-06-03 RX ADMIN — CEFTRIAXONE 100 MILLIGRAM(S): 500 INJECTION, POWDER, FOR SOLUTION INTRAMUSCULAR; INTRAVENOUS at 23:05

## 2024-06-03 RX ADMIN — Medication 3 MILLILITER(S): at 17:08

## 2024-06-03 RX ADMIN — Medication 3 MILLILITER(S): at 23:37

## 2024-06-03 RX ADMIN — SODIUM CHLORIDE 2000 MILLILITER(S): 9 INJECTION, SOLUTION INTRAVENOUS at 16:57

## 2024-06-03 NOTE — ED ADULT NURSE NOTE - NSFALLRISKINTERV_ED_ALL_ED

## 2024-06-03 NOTE — ED PROVIDER NOTE - PHYSICAL EXAMINATION
CONST: Well appearing in NAD  EYES: PERRL, EOMI, Sclera and conjunctiva clear.   ENT: Oropharynx normal appearing, no erythema or exudates. Uvula midline.  CARD: Normal S1 S2; Normal rate and rhythm  RESP: Poor air entry, tachypneic   GI: Mild diffuse abd TTP. Abd soft, no rebound or guarding   MS: Normal ROM in all extremities. No midline spinal tenderness.  SKIN: Warm, dry, no acute rashes.   NEURO: A&Ox3, No focal deficits. Strength 5/5 with no sensory deficits. Steady gait. No pronator drift. Normal finger to nose.

## 2024-06-03 NOTE — H&P ADULT - ASSESSMENT
78-year-old female past medical history of COPD not on home O2, current smoker, hypertension, hyperlipidemia, CAD status post stents presents to the ED for evaluation.     could not reach son for medrec  found atorvastatin and amlodipine in her bag, but says she takes more meds  nothing on surescripts  plz confirm in am    #copde  vbg evident for co2 retention, wheezing on ausc, denies fever at home  - prednisone 40 x5  - azithromycin  - duonebs  - symbicort + albutterol (unsure what she uses at home)  - procal / mrsa / legionella / strep (in view of ++wbc)  - bipap    #unsteady gait  CTH wnl  PT Cs for further assessment  consider MRI in am (hoping will improve w dec in CO2 after bipap overnight)    #++alk phos  sp CCY per CT AP  - fu GGT  - hepatitis profile  - trend cmp    #CAD sp PCI in 2019  #HLD  hold home statin / cont Aspirin    #HTN  cont amlodipine    dvt ppx: lovenox  gi ppx: protonix  activity: AAT  diet: DASH

## 2024-06-03 NOTE — H&P ADULT - NSHPPHYSICALEXAM_GEN_ALL_CORE
Gen: NAD  HEENT: PERRL, EOMI, mouth clr, nose clr  Neck: no nodes, no JVD, thyroid nl  lungs: wheezing  hrt: s1 s2 rrr no murmur  abd: soft, NT/ND, no HS megaly  ext: no edema, no c/c  neuro: aa ox3, cn intact, can move all 4 ext

## 2024-06-03 NOTE — ED PROVIDER NOTE - CCCP TRG CHIEF CMPLNT
Detail Level: Detailed Add 67859 Cpt? (Important Note: In 2017 The Use Of 95124 Is Being Tracked By Cms To Determine Future Global Period Reimbursement For Global Periods): yes weakness

## 2024-06-03 NOTE — ED PROVIDER NOTE - PROGRESS NOTE DETAILS
Pt BP and work of breathing improved after IV fluids and bipap. Will admit given O2 requirments and further evaluation for infectious pathology. JS: Pt refused rectal temp    Noted to be hypoxic to 80% on RA, tachyoneic, belly breathing. Bipap initiated as well as COPD meds, fluids. Will continue to monitor and reassess.

## 2024-06-03 NOTE — ED PROVIDER NOTE - OBJECTIVE STATEMENT
78-year-old female past medical history of COPD not on home O2, current smoker, hypertension, hyperlipidemia, CAD status post stents presents to the ED for evaluation.  Patient with multiple medical complaints, complaining of unsteadiness of gait, generalized weakness x 1 week.  Son at bedside providing additional history, reports that patient was supposed to be set up with home oxygen as she been having worsening shortness of breath as well over the last several weeks but never got the prescription.  Associated cough, wheezing and urinary frequency.  Denies any chest pain, nausea, vomiting, diarrhea, fevers, chills.

## 2024-06-03 NOTE — H&P ADULT - NSCORESITESY/N_GEN_A_CORE_RD
No

## 2024-06-03 NOTE — ED PROVIDER NOTE - CLINICAL SUMMARY MEDICAL DECISION MAKING FREE TEXT BOX
78-year-old female past medical history of COPD not on home O2 (but was given Rx for O2, never filled), current smoker, hypertension, hyperlipidemia, CAD status post stents presents to the ED for evaluation.  Patient with multiple medical complaints, complaining of unsteadiness of gait, generalized weakness x 1 week.  Associated with cough, wheezing and urinary frequency.  Denies any chest pain, nausea, vomiting, diarrhea, fevers, chills.  COPD/Acute hypercarbic respiratory failure requiring BIPAP.  ADMIT.

## 2024-06-03 NOTE — H&P ADULT - ATTENDING COMMENTS
I saw the patient on the 4A floor on 6/4 and did not supervise the H&P or med reconciliation having not been on service at the initial time of admission.     Pt this am was improving as she was on the bipap overnight. Placed on room air with NC available if she desaturates. No complaints. Feels sob has improved but still wheezing. Does not have O2 at home. Smokes a pack a day still. Has muscle weakness.    CONSTITUTIONAL: NAD  EYES: PERRLA; conjunctiva and sclera clear  ENMT: Moist oral mucosa, no pharyngeal injection or exudates; normal dentition  NECK: Supple, no palpable masses; no thyromegaly  RESPIRATORY: wheezing in all lung fields  CARDIOVASCULAR: Regular rate and rhythm, normal S1 and S2, no murmur/rub/gallop; No lower extremity edema; Peripheral pulses are 2+ bilaterally  ABDOMEN: Nontender to palpation, normoactive bowel sounds, no rebound/guarding; No hepatosplenomegaly  MUSCULOSKELETAL:  did not assess gait; no clubbing or cyanosis of digits; no joint swelling or tenderness to palpation  PSYCH: A+O to person, place, and time; affect appropriate  SKIN: No rashes; no palpable lesions    #Acute on chronic COPD exacerbation  #acute hypercapneic respiratory failure  - NCHCT wnl  - VBG improved after bipap overnight with CO2 46  - continue solumedrol for now as she is wheezing  - ppi and ISS  - continue duoneb and symbicort  - cant do home O2 as she is an active smoker so will need to optimize where she does not desaturate on exertion  - stop azithromycin  - obtain full RVP  - bipap overnight  - PT consult    #CAD sp PCI in 2019  #HLD   cont Aspirin  - resume statin (elevated ALP is not a CI)    #HTN  cont amlodipine    dvt ppx: lovenox  gi ppx: protonix  activity: AAT  diet: DASH

## 2024-06-03 NOTE — H&P ADULT - HISTORY OF PRESENT ILLNESS
78-year-old female past medical history of COPD not on home O2, current smoker, hypertension, hyperlipidemia, CAD status post stents presents to the ED for evaluation.  Patient with multiple medical complaints, complaining of unsteadiness of gait, generalized weakness x 1 week.  Son at bedside providing additional history, reports that patient was supposed to be set up with home oxygen as she been having worsening shortness of breath as well over the last several weeks but never got the prescription.  Associated cough, wheezing and urinary frequency.  Denies any chest pain, nausea, vomiting, diarrhea, fevers, chills.    Pt is an active smoker, smoked 1 pack a day  AA x 2 at encounter, but alert and responds all questions appropriately  tried calling son for further history and medrec, w no response    in ED  VS spo2 92 on 3L  wbc 14.3  bili 1.5  AST/ALT 76/46  Ak phos 155  vbg: pH 7.3 / pCO2 58   UA ng  rvp NG      CTH and CT AP: no acute path

## 2024-06-03 NOTE — ED ADULT NURSE NOTE - NSFALLCONCLUSION_ED_ALL_ED
DATE:



SUBJECTIVE:  Ms. Douglas is resting comfortably in bed.  She is currently

getting a chest x-ray this morning ordered by the specialist.  She has

slept well and eating well.  No chest pain, no shortness of breath and in

good spirits.  She understand tomorrow she is going to [facility] for

physical therapy and continued the antibiotics.



MEDICATIONS:  She is currently on Amaryl, Glucophage, insulin coverage,

Imodium, Lasix, Lipitor, Merrem IV, Neurontin, Plavix, IV fluids,

Synthroid, Toradol, and vancomycin.



PHYSICAL EXAMINATION

VITAL SIGNS:  She has a 98.4 temp, 70 pulse, 142/66 blood pressure, 19

respiratory rate, and 97% of O2 saturation on 2 liters nasal cannula.

HEENT:  Head is atraumatic and normocephalic.

HEART:  Regular rate.

LUNGS:  Decrease breath sounds, but clear.

ABDOMEN:  Morbidly obese and soft.  She is to be little bit distended

today.  She has 10 bowel movements yesterday and she might be having some

bloating from gas.

EXTREMITIES:  No edema.



LABORATORY DATA:  She has a 8.3 white count, 9.4 hemoglobin, 28.8

hematocrit, and 153 platelets.  She has a 136 sodium and potassium is low

at 2.7, I am going to give her potassium today as replacement.  BUN is 14

and creatinine is 0.9.  GFR is greater than is 60.  Sugar is 96, calcium is

8.6, magnesium is 1.6, and total bilirubin is 0.6.  AST is 24, ALT is 26,

alkaline phosphatase is 51, and total protein is 6.3.  We will also replace

the magnesium.



ASSESSMENT AND PLAN:  She has been seen by Infectious Disease, Neurology,

and Podiatry, I will going to replace potassium today.  Abdomen x-ray,

chest x-ray and foot x-ray are pending.  She has multiple issues.  Urinary

tract infection, old cerebrovascular accident, diabetes, low potassium,

systemic inflammatory response syndrome, chronic congestive heart failure,

and now she has got abdominal bloating.







__________________________________________

Guero Reilly DO





DD:  11/11/2017 8:33:56

DT:  11/11/2017 9:56:50

Job # [id number]
DATE:



SUBJECTIVE:  She came in last night, daughter bringing her in.  She was

lethargic, confused, elevated temperature, hot, and now this morning a

little bit better in bed.  She   not as confused, she understands she

is in the hospital.  She is on IV fluids, Amaryl, aspirin, metformin,

insulin coverage, potassium replacement, Lasix IV, magnesium replacement,

Merrem I.V., which I think is doing a great job, Neurontin, Plavix,

levothyroxine, Tylenol, and vancomycin.  She is doing better.  She is

hungry a little bit this morning.



PHYSICAL EXAMINATION:

VITAL SIGNS:  She still has a 99.1 temperature, it was as high as 100.2

last night, 136/68 blood pressure, 83 pulse, 20 respiratory rate, and 96%

O2 sat on room air.

HEENT:  Head is atraumatic and normocephalic.  Less stress, alert, looking

at me now.  Last night, she was only giving eye contact.  Throat is moist.

NECK:  Supple.

HEART:  Regular rate.

LUNGS:  Clear to auscultation.

ABDOMEN:  Soft.  No guarding.  No rebound.  No CVA tenderness.

EXTREMITIES:  Have edema, but she can move them better and I would like to

see how she does with physical therapy.



LABORATORY DATA:  On blood tests; she has 137 sodium, potassium 1.3 up to

3.2, I am going to give 2 more K BUN is 13, and creatinine 0.8.  GFR

is greater than 60.  Sugar is 220, she is on coverage.  Calcium is 8.6 and

magnesium is up to 1.5, I will replace the magnesium for her, replace

potassium, and continue with insulin coverage.  AST is 21, ALT is 32,alk

phos 56, and total troponin is 6.5.  White count is 8, little bit better,

hemoglobin 10.2, hematocrit 30.9, and platelets 159.  Lactate was 3.7,

still high at 2.6, but improving.



She will be seen by Infectious Disease, Neurology, which I think she is

improving with the change in mentation.  I need to see what she does in

physical therapy.  Continue with aggressive treatment and care.  She has

sepsis, possible UTI, old CVA, diabetes, low potassium, and low magnesium.





__________________________________________

Guero Reilly DO





DD:  11/10/2017 8:31:27

DT:  11/10/2017 9:31:04

Job # [id number]





DAREK
DATE:  11/11/2017



NEUROLOGY FOLLOWUP



CHIEF COMPLAINT:  Change in mental status.



SUBJECTIVE:  The patient is seen and examined at bedside.  The MRI of the

brain showed a diffuse acute infarct in the right occipital lobes and old

left frontal lobe with old infarct.  Currently, no acute events overnight. 

She does have a left visual field deficit from her right occipital lobe

infarct.  She has waxing and waning of delirium, but otherwise moving all

extremities and following commands.  Case discussed with daughter at

bedside.



PAST MEDICAL HISTORY:  CHF, hypertension, type 2 diabetes mellitus,

hypothyroidism, glaucoma, spinal stenosis at L3-L4, obesity, neuropathy,

GERD, osteoarthritis, history of infarct in the left anterior frontal lobe

in the past with no residual symptoms.



PAST SURGICAL HISTORY:  Cholecystectomy.



ALLERGIES:  HYDROMORPHONE AND TRAMADOL.



FAMILY HISTORY:  Noncontributory.



SOCIAL HISTORY:  No illicit drug abuse, smoking, and EtOH abuse.



PHYSICAL EXAMINATION:

VITAL SIGNS:  Temperature 97.8, pulse rate of 72, blood pressure of 112/52,

respiratory rate of 18, and oxygen saturation 97% on room air.

GENERAL:  The patient is sitting up in bed, in no acute distress.

HEENT:  Atraumatic and normocephalic.  PERRLA.  Extraocular muscles are

intact.

NECK:  Supple.  No JVD.  No adenopathy noted.

LUNGS:  Clear to auscultation.  No adventitious sounds.

HEART:  S1 and S2, normal rate and rhythm.  No murmurs, rubs, or gallops.

ABDOMEN:  Soft, nontender, and nondistended.  Bowel sounds are present.

EXTREMITIES:  No clubbing.  No cyanosis.  Peripheral pulses 2+ felt

bilaterally.

NEUROLOGIC:  The patient is alert, oriented to person and place, month and

year.  Recall after 5 minutes is 0 out of 3.  Poor attention and slow

thought process.  Cranial nerves II through XII are intact.  Speech is

fluent without any errors.  She has a left visual field deficit from her

acute right occipital lobe infarct.  Sensory exam:  Decreased light touch,

pinprick up to the calves bilaterally, decreased vibration at the toes,

DTRs are 2+ throughout and 1 at the knees and ankles.  Coordination: 

Finger-to-nose intact.  Gait is deferred for now.



LABORATORY DATA:  Labs are reviewed.  Today's blood glucose 227.



ASSESSMENT:  This is an 78-year-old woman with history of type 2 diabetes

mellitus, hypertension, gastroesophageal reflux disease, osteoarthritis,

history of an infract in left anterior frontal lobe with no residual

deafness presented with confusion and fevers, found to have urinary tract

infection and also found to have an acute infarct on the right occipital

lobe secondary to diffuse atherosclerotic disease.



At this time recommend;

1.  Continue to treat with antibiotics for sepsis and UTI.

2.  Aspirin 81 and Plavix 75 and Lipitor 40 for stroke prevention given her

new infarct on right occipital lobe.

3.  She is subacute rehab for reconditioning and deconditioned state.

4.  Keep blood pressures between 130 to 140.

5.  Keep blood sugars between 140 to 180.

6.  Gabapentin 300 mg p.o. t.i.d. for neuropathic pain and continue with

current present medical management.







__________________________________________

Chandan Pablo MD





DD:  11/11/2017 15:06:26

DT:  11/11/2017 15:33:21

Job # [id number]
Fall Risk

## 2024-06-04 LAB
ALBUMIN SERPL ELPH-MCNC: 3.7 G/DL — SIGNIFICANT CHANGE UP (ref 3.5–5.2)
ALP SERPL-CCNC: 135 U/L — HIGH (ref 30–115)
ALT FLD-CCNC: 35 U/L — SIGNIFICANT CHANGE UP (ref 0–41)
ANION GAP SERPL CALC-SCNC: 13 MMOL/L — SIGNIFICANT CHANGE UP (ref 7–14)
AST SERPL-CCNC: 25 U/L — SIGNIFICANT CHANGE UP (ref 0–41)
BASE EXCESS BLDA CALC-SCNC: 2.9 MMOL/L — SIGNIFICANT CHANGE UP (ref -2–3)
BILIRUB SERPL-MCNC: 0.5 MG/DL — SIGNIFICANT CHANGE UP (ref 0.2–1.2)
BUN SERPL-MCNC: 19 MG/DL — SIGNIFICANT CHANGE UP (ref 10–20)
CALCIUM SERPL-MCNC: 9.1 MG/DL — SIGNIFICANT CHANGE UP (ref 8.4–10.4)
CHLORIDE SERPL-SCNC: 98 MMOL/L — SIGNIFICANT CHANGE UP (ref 98–110)
CO2 SERPL-SCNC: 26 MMOL/L — SIGNIFICANT CHANGE UP (ref 17–32)
CREAT SERPL-MCNC: 0.8 MG/DL — SIGNIFICANT CHANGE UP (ref 0.7–1.5)
CRP SERPL-MCNC: 224.8 MG/L — HIGH
CULTURE RESULTS: SIGNIFICANT CHANGE UP
EGFR: 74 ML/MIN/1.73M2 — SIGNIFICANT CHANGE UP
ERYTHROCYTE [SEDIMENTATION RATE] IN BLOOD: 33 MM/HR — HIGH (ref 0–20)
GGT SERPL-CCNC: 136 U/L — HIGH (ref 1–40)
GLUCOSE SERPL-MCNC: 195 MG/DL — HIGH (ref 70–99)
HCO3 BLDA-SCNC: 28 MMOL/L — SIGNIFICANT CHANGE UP (ref 21–28)
HCT VFR BLD CALC: 45.6 % — SIGNIFICANT CHANGE UP (ref 37–47)
HGB BLD-MCNC: 14.9 G/DL — SIGNIFICANT CHANGE UP (ref 12–16)
HOROWITZ INDEX BLDA+IHG-RTO: 40 — SIGNIFICANT CHANGE UP
MCHC RBC-ENTMCNC: 30.1 PG — SIGNIFICANT CHANGE UP (ref 27–31)
MCHC RBC-ENTMCNC: 32.7 G/DL — SIGNIFICANT CHANGE UP (ref 32–37)
MCV RBC AUTO: 92.1 FL — SIGNIFICANT CHANGE UP (ref 81–99)
NRBC # BLD: 0 /100 WBCS — SIGNIFICANT CHANGE UP (ref 0–0)
PCO2 BLDA: 43 MMHG — SIGNIFICANT CHANGE UP (ref 32–45)
PH BLDA: 7.42 — SIGNIFICANT CHANGE UP (ref 7.35–7.45)
PLATELET # BLD AUTO: 219 K/UL — SIGNIFICANT CHANGE UP (ref 130–400)
PMV BLD: 11 FL — HIGH (ref 7.4–10.4)
PO2 BLDA: 163 MMHG — HIGH (ref 83–108)
POTASSIUM SERPL-MCNC: 4.4 MMOL/L — SIGNIFICANT CHANGE UP (ref 3.5–5)
POTASSIUM SERPL-SCNC: 4.4 MMOL/L — SIGNIFICANT CHANGE UP (ref 3.5–5)
PROT SERPL-MCNC: 6.2 G/DL — SIGNIFICANT CHANGE UP (ref 6–8)
RBC # BLD: 4.95 M/UL — SIGNIFICANT CHANGE UP (ref 4.2–5.4)
RBC # FLD: 12.9 % — SIGNIFICANT CHANGE UP (ref 11.5–14.5)
SAO2 % BLDA: 100 % — HIGH (ref 94–98)
SODIUM SERPL-SCNC: 137 MMOL/L — SIGNIFICANT CHANGE UP (ref 135–146)
SPECIMEN SOURCE: SIGNIFICANT CHANGE UP
WBC # BLD: 10.96 K/UL — HIGH (ref 4.8–10.8)
WBC # FLD AUTO: 10.96 K/UL — HIGH (ref 4.8–10.8)

## 2024-06-04 PROCEDURE — 99222 1ST HOSP IP/OBS MODERATE 55: CPT

## 2024-06-04 RX ORDER — DEXTROSE 50 % IN WATER 50 %
25 SYRINGE (ML) INTRAVENOUS ONCE
Refills: 0 | Status: DISCONTINUED | OUTPATIENT
Start: 2024-06-04 | End: 2024-06-07

## 2024-06-04 RX ORDER — GLUCAGON INJECTION, SOLUTION 0.5 MG/.1ML
1 INJECTION, SOLUTION SUBCUTANEOUS ONCE
Refills: 0 | Status: DISCONTINUED | OUTPATIENT
Start: 2024-06-04 | End: 2024-06-07

## 2024-06-04 RX ORDER — DEXTROSE 50 % IN WATER 50 %
15 SYRINGE (ML) INTRAVENOUS ONCE
Refills: 0 | Status: DISCONTINUED | OUTPATIENT
Start: 2024-06-04 | End: 2024-06-07

## 2024-06-04 RX ORDER — DEXTROSE 10 % IN WATER 10 %
125 INTRAVENOUS SOLUTION INTRAVENOUS ONCE
Refills: 0 | Status: DISCONTINUED | OUTPATIENT
Start: 2024-06-04 | End: 2024-06-07

## 2024-06-04 RX ORDER — ALPRAZOLAM 0.25 MG
0.5 TABLET ORAL ONCE
Refills: 0 | Status: DISCONTINUED | OUTPATIENT
Start: 2024-06-04 | End: 2024-06-04

## 2024-06-04 RX ORDER — SODIUM CHLORIDE 9 MG/ML
1000 INJECTION, SOLUTION INTRAVENOUS
Refills: 0 | Status: DISCONTINUED | OUTPATIENT
Start: 2024-06-04 | End: 2024-06-07

## 2024-06-04 RX ORDER — DEXTROSE 50 % IN WATER 50 %
12.5 SYRINGE (ML) INTRAVENOUS ONCE
Refills: 0 | Status: DISCONTINUED | OUTPATIENT
Start: 2024-06-04 | End: 2024-06-07

## 2024-06-04 RX ORDER — ATORVASTATIN CALCIUM 80 MG/1
10 TABLET, FILM COATED ORAL AT BEDTIME
Refills: 0 | Status: DISCONTINUED | OUTPATIENT
Start: 2024-06-04 | End: 2024-06-07

## 2024-06-04 RX ORDER — INSULIN LISPRO 100/ML
VIAL (ML) SUBCUTANEOUS
Refills: 0 | Status: DISCONTINUED | OUTPATIENT
Start: 2024-06-04 | End: 2024-06-07

## 2024-06-04 RX ADMIN — ENOXAPARIN SODIUM 40 MILLIGRAM(S): 100 INJECTION SUBCUTANEOUS at 06:59

## 2024-06-04 RX ADMIN — Medication 3 MILLILITER(S): at 08:04

## 2024-06-04 RX ADMIN — Medication 81 MILLIGRAM(S): at 12:54

## 2024-06-04 RX ADMIN — Medication 0.5 MILLIGRAM(S): at 21:16

## 2024-06-04 RX ADMIN — Medication 3 MILLILITER(S): at 19:38

## 2024-06-04 RX ADMIN — BUDESONIDE AND FORMOTEROL FUMARATE DIHYDRATE 2 PUFF(S): 160; 4.5 AEROSOL RESPIRATORY (INHALATION) at 21:04

## 2024-06-04 RX ADMIN — Medication 3 MILLILITER(S): at 02:26

## 2024-06-04 RX ADMIN — ATORVASTATIN CALCIUM 10 MILLIGRAM(S): 80 TABLET, FILM COATED ORAL at 21:04

## 2024-06-04 RX ADMIN — Medication 40 MILLIGRAM(S): at 06:22

## 2024-06-04 RX ADMIN — Medication 3 MILLILITER(S): at 13:37

## 2024-06-04 RX ADMIN — PANTOPRAZOLE SODIUM 40 MILLIGRAM(S): 20 TABLET, DELAYED RELEASE ORAL at 06:21

## 2024-06-04 RX ADMIN — Medication 40 MILLIGRAM(S): at 18:12

## 2024-06-04 RX ADMIN — AMLODIPINE BESYLATE 10 MILLIGRAM(S): 2.5 TABLET ORAL at 06:22

## 2024-06-04 NOTE — PATIENT PROFILE ADULT - FALL HARM RISK - FACTORS
Detail Level: Simple Send Cpt Codes To Pm?: Yes IV and/or equipment tethered to patient/Other medical problems/Weakness/Other

## 2024-06-04 NOTE — PATIENT PROFILE ADULT - MST SCORE
Ammon called and left a message to let you know that she had her baby. She wanted to know if she still needs to continue to have the infusions? I tried to call her back to see how she is feeling,but she did not answer. I left her a message.  
I tried to call the patient two times. I left a message with this information.  
If she is doing well then no  
2

## 2024-06-04 NOTE — PATIENT PROFILE ADULT - FALL HARM RISK - RISK INTERVENTIONS

## 2024-06-04 NOTE — PATIENT PROFILE ADULT - FALL HARM RISK - TYPE OF ASSESSMENT
[facility]  [place] Discharge Summary                                                                             Name:  Bladimir Orta  :  2019  Hospital Day:  2  MRN:  [id number]  Attending:  Darwyn Dakins, MD      Date of Delivery:   0726    HCT 30.6 % (L) 09/12/17 0726    Glucose 1 hour 104 mg/dL 06/01/17 1139    Glucose Tobi 3 hr Gestational Fasting       1 Hour glucose       2 Hour glucose       3 Hour glucose             3rd Trimester Labs (GA 24-41w)     Test Value Date Time    Ant (-10 Yrs)                          2017      TcB Results:    TCB   Date Value Ref Range Status   2017 7.10  Final   2017 6.50  Final   2017 4.30  Final   ----------    Last several TCB's in low risk zone (most recent 7.1 at 5 Admission

## 2024-06-04 NOTE — PROGRESS NOTE ADULT - ASSESSMENT
78-year-old female past medical history of COPD not on home O2, current smoker, hypertension, hyperlipidemia, CAD status post stents presents to the ED for evaluation.     #Acute on chronic COPD exacerbation  #acute hypercapneic respiratory failure  - NCHCT wnl  - VBG improved after bipap overnight with CO2 46  - continue solumedrol for now as she is wheezing  - ppi and ISS  - continue duoneb and symbicort  - stop azithromycin  - obtain full RVP (verbally approved by Dr. Loomis from [facility])  - bipap overnight  - PT consult: patient was on bipap when PT came on 6/4, f/u on 6/5.    #CAD sp PCI in 2019  #HLD   cont Aspirin  - resume statin (elevated ALP is not a CI)    #HTN  cont amlodipine    dvt ppx: lovenox  gi ppx: protonix  activity: AAT  diet: DASH .    78-year-old female past medical history of COPD not on home O2, current smoker, hypertension, hyperlipidemia, CAD status post stents presents to the ED for evaluation.     #Acute on chronic COPD exacerbation  #acute hypercapneic respiratory failure  - NCHCT wnl  - VBG improved after bipap overnight with CO2 46  - continue solumedrol for now as she is wheezing  - ppi and ISS  - continue duoneb and symbicort  - stop azithromycin  - obtain full RVP (verbally approved by Dr. Loomis from [facility])  - bipap overnight  - PT consult: patient was on bipap when PT came on 6/4, f/u on 6/5.    #Dysphagia  - Frequent coughing while eating per son. Also had coughing bout on 6/4 during breakfast.  - S/s eval 6/4: easy to chew w/ mildly thick liquids.    #CAD sp PCI in 2019  #HLD   cont Aspirin  - resume statin (elevated ALP is not a CI)    #HTN  cont amlodipine    #Elevated glucose  - Not diabetic (per patient), ordered ISS and fingersticks given that patient is on steroid for above issue.    dvt ppx: lovenox  gi ppx: protonix  activity: AAT  diet: DASH .     Med rec:  Patient's son brought meds on 6/4. Had bottles only for atorva 10 qhs and amlodipine 10 q am and Xanax 0.5 prn (receives 15 pills/month from her doctor). Patient and son state that she does not take any other meds. Will clarify about ASA, Plavix and Carbamezapine specifically. Pharmacy is [address]

## 2024-06-04 NOTE — PROGRESS NOTE ADULT - SUBJECTIVE AND OBJECTIVE BOX
24H events:    Patient is a 78y old Female who presents with a chief complaint of   Primary diagnosis of COPD exacerbation    Today is hospital day 1d. This morning patient was seen and examined at bedside, resting comfortably in bed.    No acute or major events overnight.    The patient     Code Status:    Family communication:  Contact date:  Name of person contacted:  Relationship to patient:  Communication details:  What matters most:    PAST MEDICAL & SURGICAL HISTORY  CVA (cerebral vascular accident)    Anxiety    HTN (hypertension)    COPD (chronic obstructive pulmonary disease)    S/P total abdominal hysterectomy      SOCIAL HISTORY:  Social History:      ALLERGIES:  No Known Allergies    MEDICATIONS:  STANDING MEDICATIONS  albuterol/ipratropium for Nebulization 3 milliLiter(s) Nebulizer every 6 hours  amLODIPine   Tablet 10 milliGRAM(s) Oral daily  aspirin enteric coated 81 milliGRAM(s) Oral daily  budesonide 160 MICROgram(s)/formoterol 4.5 MICROgram(s) Inhaler 2 Puff(s) Inhalation two times a day  dextrose 10% Bolus 125 milliLiter(s) IV Bolus once  dextrose 5%. 1000 milliLiter(s) IV Continuous <Continuous>  dextrose 5%. 1000 milliLiter(s) IV Continuous <Continuous>  dextrose 50% Injectable 25 Gram(s) IV Push once  dextrose 50% Injectable 12.5 Gram(s) IV Push once  enoxaparin Injectable 40 milliGRAM(s) SubCutaneous every 24 hours  glucagon  Injectable 1 milliGRAM(s) IntraMuscular once  insulin lispro (ADMELOG) corrective regimen sliding scale   SubCutaneous three times a day before meals  methylPREDNISolone sodium succinate Injectable 40 milliGRAM(s) IV Push every 12 hours  pantoprazole    Tablet 40 milliGRAM(s) Oral before breakfast    PRN MEDICATIONS  albuterol    90 MICROgram(s) HFA Inhaler 2 Puff(s) Inhalation every 6 hours PRN  dextrose Oral Gel 15 Gram(s) Oral once PRN    VITALS:   T(F): 97.4  HR: 62  BP: 122/64  RR: 18  SpO2: 96%    PHYSICAL EXAM:  GENERAL:   (  ) NAD, lying in bed comfortably     (  ) obtunded     (  ) lethargic     (  ) somnolent    HEAD:   (  ) Atraumatic     (  ) hematoma     (  ) laceration (specify location:       )     NECK:  (  ) Supple     (  ) neck stiffness     (  ) nuchal rigidity     (  )  no JVD     (  ) JVD present ( -- cm)    HEART:  Rate -->     (  ) normal rate     (  ) bradycardic     (  ) tachycardic  Rhythm -->     (  ) regular     (  ) regularly irregular     (  ) irregularly irregular  Murmurs -->     (  ) normal s1s2     (  ) systolic murmur     (  ) diastolic murmur     (  ) continuous murmur      (  ) S3 present     (  ) S4 present    LUNGS:   (  )Unlabored respirations     (  ) tachypnea  (  ) B/L air entry     (  ) decreased breath sounds in:  (location     )    (  ) no adventitious sound     (  ) crackles     (  ) wheezing      (  ) rhonchi      (specify location:       )  (  ) chest wall tenderness (specify location:       )    ABDOMEN:   (  ) Soft     (  ) tense   |   (  ) nondistended     (  ) distended   |   (  ) +BS     (  ) hypoactive bowel sounds     (  ) hyperactive bowel sounds  (  ) nontender     (  ) RUQ tenderness     (  ) RLQ tenderness     (  ) LLQ tenderness     (  ) epigastric tenderness     (  ) diffuse tenderness  (  ) Splenomegaly      (  ) Hepatomegaly      (  ) Jaundice     (  ) ecchymosis     EXTREMITIES:  (  ) Normal     (  ) Rash     (  ) ecchymosis     (  ) varicose veins      (  ) pitting edema     (  ) non-pitting edema   (  ) ulceration     (  ) gangrene:     (location:     )    NERVOUS SYSTEM:    (  ) A&Ox3     (  ) confused     (  ) lethargic  CN II-XII:     (  ) Intact     (  ) deficits found     (Specify:     )   Upper extremities:     (  ) no sensorimotor deficits     (  ) weakness     (  ) loss of proprioception/vibration     (  ) loss of touch/temperature (specify:    )  Lower extremities:     (  ) no sensorimotor deficits     (  ) weakness     (  ) loss of proprioception/vibration     (  ) loss of touch/temperature (specify:    )    SKIN:   (  ) No rashes or lesions     (  ) maculopapular rash     (  ) pustules     (  ) vesicles     (  ) ulcer     (  ) ecchymosis     (specify location:     )    AMPAC score:    (  ) Indwelling Rousseau Catheter:   Date insterted:    Reason (  ) Critical illness     (  ) urinary retention    (  ) Accurate Ins/Outs Monitoring     (  ) CMO patient    (  ) Central Line:   Date inserted:  Location: (  ) Right IJ     (  ) Left IJ     (  ) Right Fem     (  ) Left Fem    (  ) SPC        (  ) pigtail       (  ) PEG tube       (  ) colostomy       (  ) jejunostomy  (  ) U-Dall    LABS:                        14.9   10.96 )-----------( 219      ( 04 Jun 2024 07:49 )             45.6     06-04    137  |  98  |  19  ----------------------------<  195<H>  4.4   |  26  |  0.8    Ca    9.1      04 Jun 2024 07:49    TPro  6.2  /  Alb  3.7  /  TBili  0.5  /  DBili  x   /  AST  25  /  ALT  35  /  AlkPhos  135<H>  06-04    PT/INR - ( 03 Jun 2024 17:04 )   PT: 14.70 sec;   INR: 1.29 ratio         PTT - ( 03 Jun 2024 17:04 )  PTT:30.3 sec  Urinalysis Basic - ( 04 Jun 2024 07:49 )    Color: x / Appearance: x / SG: x / pH: x  Gluc: 195 mg/dL / Ketone: x  / Bili: x / Urobili: x   Blood: x / Protein: x / Nitrite: x   Leuk Esterase: x / RBC: x / WBC x   Sq Epi: x / Non Sq Epi: x / Bacteria: x      ABG - ( 04 Jun 2024 08:39 )  pH, Arterial: 7.42  pH, Blood: x     /  pCO2: 43    /  pO2: 163   / HCO3: 28    / Base Excess: 2.9   /  SaO2: 100.0             Sedimentation Rate, Erythrocyte: 33 mm/Hr *H* (06-04-24 @ 00:09)      Urinalysis with Rflx Culture (collected 03 Jun 2024 16:04)          RADIOLOGY:           24H events:    Patient is a 78y old Female who presents with a chief complaint of   Primary diagnosis of COPD exacerbation    Today is hospital day 1d. This morning patient was seen and examined at bedside, resting comfortably in bed.    No acute or major events overnight.    The patient was on BIPAP in the morning, comfortable, stating that she feels better. Soon after, BIPAP was removed, and patient began to eat  breakfast 25 mins later, and had a bout of coughing, initially she could not speak due to frequency of coughs, but then slowly cleared up. Later felt okay. Son at bedside later stated that she often coughs while eating food, more than her baseline COPD cough. Patient denies other symptomatic complaints.    Code Status:  Full    Family communication:  Contact date: 6/4/24  Name of person contacted: Dexter  Relationship to patient: Son  Communication details: Gave medical update, received collateral, obtained med list. In agreement with plan.  What matters most: Improvement in breathing    PAST MEDICAL & SURGICAL HISTORY  CVA (cerebral vascular accident)    Anxiety    HTN (hypertension)    COPD (chronic obstructive pulmonary disease)    S/P total abdominal hysterectomy      SOCIAL HISTORY:  Social History:      ALLERGIES:  No Known Allergies    MEDICATIONS:  STANDING MEDICATIONS  albuterol/ipratropium for Nebulization 3 milliLiter(s) Nebulizer every 6 hours  amLODIPine   Tablet 10 milliGRAM(s) Oral daily  aspirin enteric coated 81 milliGRAM(s) Oral daily  budesonide 160 MICROgram(s)/formoterol 4.5 MICROgram(s) Inhaler 2 Puff(s) Inhalation two times a day  dextrose 10% Bolus 125 milliLiter(s) IV Bolus once  dextrose 5%. 1000 milliLiter(s) IV Continuous <Continuous>  dextrose 5%. 1000 milliLiter(s) IV Continuous <Continuous>  dextrose 50% Injectable 25 Gram(s) IV Push once  dextrose 50% Injectable 12.5 Gram(s) IV Push once  enoxaparin Injectable 40 milliGRAM(s) SubCutaneous every 24 hours  glucagon  Injectable 1 milliGRAM(s) IntraMuscular once  insulin lispro (ADMELOG) corrective regimen sliding scale   SubCutaneous three times a day before meals  methylPREDNISolone sodium succinate Injectable 40 milliGRAM(s) IV Push every 12 hours  pantoprazole    Tablet 40 milliGRAM(s) Oral before breakfast    PRN MEDICATIONS  albuterol    90 MICROgram(s) HFA Inhaler 2 Puff(s) Inhalation every 6 hours PRN  dextrose Oral Gel 15 Gram(s) Oral once PRN    VITALS:   T(F): 97.4  HR: 62  BP: 122/64  RR: 18  SpO2: 96%    PHYSICAL EXAM:  GENERAL:   ( X ) NAD, lying in bed comfortably     (  ) obtunded     (  ) lethargic     (  ) somnolent    HEAD:   ( X ) Atraumatic     (  ) hematoma     (  ) laceration (specify location:       )     NECK:  ( X ) Supple     (  ) neck stiffness     (  ) nuchal rigidity     (  )  no JVD     (  ) JVD present ( -- cm)    HEART:  Rate -->     ( X ) normal rate     (  ) bradycardic     (  ) tachycardic  Rhythm -->     ( X ) regular     (  ) regularly irregular     (  ) irregularly irregular  Murmurs -->     ( X ) normal s1s2     (  ) systolic murmur     (  ) diastolic murmur     (  ) continuous murmur      (  ) S3 present     (  ) S4 present    LUNGS:   ( X ) Unlabored respirations     (  ) tachypnea  ( X ) B/L air entry     (  ) decreased breath sounds in:  (location     )    (  ) no adventitious sound     (  ) crackles     ( X ) wheezing b/l diffuse     (  ) rhonchi      (specify location:       )  (  ) chest wall tenderness (specify location:       )    ABDOMEN:   ( X ) Soft     (  ) tense   |   ( X ) nondistended     (  ) distended   |   (  ) +BS     (  ) hypoactive bowel sounds     (  ) hyperactive bowel sounds  ( X ) nontender     (  ) RUQ tenderness     (  ) RLQ tenderness     (  ) LLQ tenderness     (  ) epigastric tenderness     (  ) diffuse tenderness  (  ) Splenomegaly      (  ) Hepatomegaly      (  ) Jaundice     (  ) ecchymosis     EXTREMITIES:  ( X ) Normal     (  ) Rash     (  ) ecchymosis     (  ) varicose veins      (  ) pitting edema     (  ) non-pitting edema   (  ) ulceration     (  ) gangrene:     (location:     )    NERVOUS SYSTEM:    ( X ) A&Ox3     (  ) confused     (  ) lethargic  CN II-XII:     ( X ) Intact     (  ) deficits found     (Specify:     )   Upper extremities:     ( X ) no sensorimotor deficits     (  ) weakness     (  ) loss of proprioception/vibration     (  ) loss of touch/temperature (specify:    )  Lower extremities:     ( X ) no sensorimotor deficits     (  ) weakness     (  ) loss of proprioception/vibration     (  ) loss of touch/temperature (specify:    )      LABS:                        14.9   10.96 )-----------( 219      ( 04 Jun 2024 07:49 )             45.6     06-04    137  |  98  |  19  ----------------------------<  195<H>  4.4   |  26  |  0.8    Ca    9.1      04 Jun 2024 07:49    TPro  6.2  /  Alb  3.7  /  TBili  0.5  /  DBili  x   /  AST  25  /  ALT  35  /  AlkPhos  135<H>  06-04    PT/INR - ( 03 Jun 2024 17:04 )   PT: 14.70 sec;   INR: 1.29 ratio         PTT - ( 03 Jun 2024 17:04 )  PTT:30.3 sec    ABG - ( 04 Jun 2024 08:39 )  pH, Arterial: 7.42  pH, Blood: x     /  pCO2: 43    /  pO2: 163   / HCO3: 28    / Base Excess: 2.9   /  SaO2: 100.0       Sedimentation Rate, Erythrocyte: 33 mm/Hr *H* (06-04-24 @ 00:09)      Urinalysis with Rflx Culture (collected 03 Jun 2024 16:04)

## 2024-06-04 NOTE — PATIENT PROFILE ADULT - NSTOBACCOCESSATIONEDU4_GEN_A_NUR
Patient sitting up in bed.   Smoking even a single puff increases the likelihood of a full relapse, withdrawal symptoms peak within 1-2 weeks, but can persist for months

## 2024-06-05 DIAGNOSIS — J44.1 CHRONIC OBSTRUCTIVE PULMONARY DISEASE WITH (ACUTE) EXACERBATION: ICD-10-CM

## 2024-06-05 DIAGNOSIS — Z51.5 ENCOUNTER FOR PALLIATIVE CARE: ICD-10-CM

## 2024-06-05 LAB
A1C WITH ESTIMATED AVERAGE GLUCOSE RESULT: 6.9 % — HIGH (ref 4–5.6)
ALBUMIN SERPL ELPH-MCNC: 3.5 G/DL — SIGNIFICANT CHANGE UP (ref 3.5–5.2)
ALP SERPL-CCNC: 140 U/L — HIGH (ref 30–115)
ALT FLD-CCNC: 34 U/L — SIGNIFICANT CHANGE UP (ref 0–41)
ANION GAP SERPL CALC-SCNC: 9 MMOL/L — SIGNIFICANT CHANGE UP (ref 7–14)
AST SERPL-CCNC: 23 U/L — SIGNIFICANT CHANGE UP (ref 0–41)
BASOPHILS # BLD AUTO: 0.02 K/UL — SIGNIFICANT CHANGE UP (ref 0–0.2)
BASOPHILS NFR BLD AUTO: 0.1 % — SIGNIFICANT CHANGE UP (ref 0–1)
BILIRUB SERPL-MCNC: 0.3 MG/DL — SIGNIFICANT CHANGE UP (ref 0.2–1.2)
BUN SERPL-MCNC: 30 MG/DL — HIGH (ref 10–20)
CALCIUM SERPL-MCNC: 9.2 MG/DL — SIGNIFICANT CHANGE UP (ref 8.4–10.5)
CHLORIDE SERPL-SCNC: 104 MMOL/L — SIGNIFICANT CHANGE UP (ref 98–110)
CO2 SERPL-SCNC: 28 MMOL/L — SIGNIFICANT CHANGE UP (ref 17–32)
CREAT SERPL-MCNC: 0.8 MG/DL — SIGNIFICANT CHANGE UP (ref 0.7–1.5)
EGFR: 74 ML/MIN/1.73M2 — SIGNIFICANT CHANGE UP
EOSINOPHIL # BLD AUTO: 0 K/UL — SIGNIFICANT CHANGE UP (ref 0–0.7)
EOSINOPHIL NFR BLD AUTO: 0 % — SIGNIFICANT CHANGE UP (ref 0–8)
ESTIMATED AVERAGE GLUCOSE: 151 MG/DL — HIGH (ref 68–114)
FOLATE SERPL-MCNC: 19.5 NG/ML — SIGNIFICANT CHANGE UP
GLUCOSE BLDC GLUCOMTR-MCNC: 147 MG/DL — HIGH (ref 70–99)
GLUCOSE BLDC GLUCOMTR-MCNC: 206 MG/DL — HIGH (ref 70–99)
GLUCOSE BLDC GLUCOMTR-MCNC: 209 MG/DL — HIGH (ref 70–99)
GLUCOSE BLDC GLUCOMTR-MCNC: 294 MG/DL — HIGH (ref 70–99)
GLUCOSE SERPL-MCNC: 150 MG/DL — HIGH (ref 70–99)
HAV IGM SER-ACNC: SIGNIFICANT CHANGE UP
HBV CORE IGM SER-ACNC: SIGNIFICANT CHANGE UP
HBV SURFACE AG SER-ACNC: SIGNIFICANT CHANGE UP
HCT VFR BLD CALC: 42.2 % — SIGNIFICANT CHANGE UP (ref 37–47)
HCV AB S/CO SERPL IA: 0.06 S/CO — SIGNIFICANT CHANGE UP (ref 0–0.99)
HCV AB SERPL-IMP: SIGNIFICANT CHANGE UP
HGB BLD-MCNC: 13.9 G/DL — SIGNIFICANT CHANGE UP (ref 12–16)
IMM GRANULOCYTES NFR BLD AUTO: 0.6 % — HIGH (ref 0.1–0.3)
LYMPHOCYTES # BLD AUTO: 0.91 K/UL — LOW (ref 1.2–3.4)
LYMPHOCYTES # BLD AUTO: 5.3 % — LOW (ref 20.5–51.1)
MAGNESIUM SERPL-MCNC: 2.6 MG/DL — HIGH (ref 1.8–2.4)
MCHC RBC-ENTMCNC: 30.4 PG — SIGNIFICANT CHANGE UP (ref 27–31)
MCHC RBC-ENTMCNC: 32.9 G/DL — SIGNIFICANT CHANGE UP (ref 32–37)
MCV RBC AUTO: 92.3 FL — SIGNIFICANT CHANGE UP (ref 81–99)
MONOCYTES # BLD AUTO: 1.09 K/UL — HIGH (ref 0.1–0.6)
MONOCYTES NFR BLD AUTO: 6.4 % — SIGNIFICANT CHANGE UP (ref 1.7–9.3)
NEUTROPHILS # BLD AUTO: 14.98 K/UL — HIGH (ref 1.4–6.5)
NEUTROPHILS NFR BLD AUTO: 87.6 % — HIGH (ref 42.2–75.2)
NRBC # BLD: 0 /100 WBCS — SIGNIFICANT CHANGE UP (ref 0–0)
PHOSPHATE SERPL-MCNC: 3.7 MG/DL — SIGNIFICANT CHANGE UP (ref 2.1–4.9)
PLATELET # BLD AUTO: 246 K/UL — SIGNIFICANT CHANGE UP (ref 130–400)
PMV BLD: 11.2 FL — HIGH (ref 7.4–10.4)
POTASSIUM SERPL-MCNC: 4.6 MMOL/L — SIGNIFICANT CHANGE UP (ref 3.5–5)
POTASSIUM SERPL-SCNC: 4.6 MMOL/L — SIGNIFICANT CHANGE UP (ref 3.5–5)
PROCALCITONIN SERPL-MCNC: 0.1 NG/ML — SIGNIFICANT CHANGE UP (ref 0.02–0.1)
PROT SERPL-MCNC: 5.9 G/DL — LOW (ref 6–8)
RBC # BLD: 4.57 M/UL — SIGNIFICANT CHANGE UP (ref 4.2–5.4)
RBC # FLD: 12.7 % — SIGNIFICANT CHANGE UP (ref 11.5–14.5)
SODIUM SERPL-SCNC: 141 MMOL/L — SIGNIFICANT CHANGE UP (ref 135–146)
VIT B12 SERPL-MCNC: 1615 PG/ML — HIGH (ref 232–1245)
WBC # BLD: 17.1 K/UL — HIGH (ref 4.8–10.8)
WBC # FLD AUTO: 17.1 K/UL — HIGH (ref 4.8–10.8)

## 2024-06-05 PROCEDURE — 99223 1ST HOSP IP/OBS HIGH 75: CPT | Mod: 25

## 2024-06-05 PROCEDURE — 99497 ADVNCD CARE PLAN 30 MIN: CPT | Mod: 25

## 2024-06-05 PROCEDURE — 99232 SBSQ HOSP IP/OBS MODERATE 35: CPT

## 2024-06-05 RX ADMIN — Medication 3 MILLILITER(S): at 20:29

## 2024-06-05 RX ADMIN — Medication 3 MILLILITER(S): at 13:17

## 2024-06-05 RX ADMIN — Medication 6: at 12:05

## 2024-06-05 RX ADMIN — PANTOPRAZOLE SODIUM 40 MILLIGRAM(S): 20 TABLET, DELAYED RELEASE ORAL at 05:44

## 2024-06-05 RX ADMIN — Medication 40 MILLIGRAM(S): at 05:43

## 2024-06-05 RX ADMIN — ATORVASTATIN CALCIUM 10 MILLIGRAM(S): 80 TABLET, FILM COATED ORAL at 21:11

## 2024-06-05 RX ADMIN — Medication 3 MILLILITER(S): at 08:45

## 2024-06-05 RX ADMIN — Medication 4: at 16:15

## 2024-06-05 RX ADMIN — Medication 81 MILLIGRAM(S): at 12:08

## 2024-06-05 RX ADMIN — AMLODIPINE BESYLATE 10 MILLIGRAM(S): 2.5 TABLET ORAL at 05:43

## 2024-06-05 RX ADMIN — ENOXAPARIN SODIUM 40 MILLIGRAM(S): 100 INJECTION SUBCUTANEOUS at 09:40

## 2024-06-05 RX ADMIN — BUDESONIDE AND FORMOTEROL FUMARATE DIHYDRATE 2 PUFF(S): 160; 4.5 AEROSOL RESPIRATORY (INHALATION) at 09:39

## 2024-06-05 NOTE — PROGRESS NOTE ADULT - ATTENDING COMMENTS
patient seen and examined independently of medical resident and agree with the note unless otherwise stated     Vital Signs Last 24 Hrs  T(C): 36.3 (05 Jun 2024 12:06), Max: 36.6 (05 Jun 2024 05:59)  T(F): 97.4 (05 Jun 2024 12:06), Max: 97.8 (05 Jun 2024 05:59)  HR: 83 (05 Jun 2024 12:06) (78 - 84)  BP: 123/56 (05 Jun 2024 13:15) (112/53 - 131/67)  BP(mean): --  RR: 18 (05 Jun 2024 13:15) (16 - 19)  SpO2: 88% (05 Jun 2024 13:15) (88% - 98%)    Parameters below as of 05 Jun 2024 13:15  Patient On (Oxygen Delivery Method): room air  O2 Flow (L/min): 0                          13.9   17.10 )-----------( 246      ( 05 Jun 2024 06:30 )             42.2   06-05    141  |  104  |  30<H>  ----------------------------<  150<H>  4.6   |  28  |  0.8    Ca    9.2      05 Jun 2024 06:30  Phos  3.7     06-05  Mg     2.6     06-05    TPro  5.9<L>  /  Alb  3.5  /  TBili  0.3  /  DBili  x   /  AST  23  /  ALT  34  /  AlkPhos  140<H>  06-05    # COPD exacerbation   # Age related debility / unsteady gait   # Dysphagia     -IV steroids, PPI, nebs, symbicort - on RA   -home meds   -swallow team on board - FEES today - currently of modified thickened diet - aspiration precautions   -rehab/pt as tolerated   -Palliative care team input appreciated - DNR/DNI with guarded prognosis     DISPO: not discharge ready   -spoke to patient about her plan of care     Attending Physician Dr. Deana Montes # [id number]

## 2024-06-05 NOTE — SWALLOW FEES ASSESSMENT ADULT - COMMENTS
Daughter reports mild cog deficits at baseline, ambulates and socializes with friends at restaurants, was at mall last week. Currently slightly more confused w/ recall impairments

## 2024-06-05 NOTE — SWALLOW FEES ASSESSMENT ADULT - RECOMMENDED CONSISTENCY
Past History


Past Medical History:  Hypertension


 (CATHY GIORDANO APRN)


Past Surgical History:  No Surgical History


 (CATHY GIORDANO APRN)





Adult General


Chief Complaint


Chief Complaint:  HEADACHE





HPI


HPI





Patient is a 45-year-old male patient presented to the ED today with multiple 

complaints.  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 

he states his left eye had an area on the visual field where it became gliding. 

He states he closed his eye, and noted a white visiual filed in the left eye but

symptoms subsided right away.  He states he also developed right upper extremity

numbness.  He states he developed a slight headache patient also states he had 

[employer] for lung and he is having abdominal pain.  Patient also states he is 

going to [employer] in 2 days and wants to be checked to make sure everything

is okay


 (CATHY GIORDANO ELLIE APRN)





Review of Systems


Review of Systems





Constitutional: Denies fever or chills []


Eyes: Denies change in visual acuity, redness, or eye pain []


HENT: Denies nasal congestion or sore throat []


Respiratory: Denies cough or shortness of breath []


Cardiovascular: No additional information not addressed in HPI []


GI: Denies abdominal pain, nausea, vomiting, bloody stools or diarrhea []


: Denies dysuria or hematuria []


Musculoskeletal: Denies back pain or joint pain []


Integument: Denies rash or skin lesions []


Neurologic: Denies headache, focal weakness or sensory changes []


Endocrine: Denies polyuria or polydipsia []





All other systems were reviewed and found to be within normal limits, except as 

documented in this note.


 (COMFORTCATHY EMMANUEL APRN)





Physical Exam


Physical Exam





Constitutional: Well developed, well nourished, no acute distress, non-toxic 

appearance. []


HENT: Normocephalic, atraumatic, bilateral external ears normal, oropharynx 

moist, no oral exudates, nose normal. []


Eyes: PERRLA, EOMI, conjunctiva normal, no discharge. [] 


Neck: Normal range of motion, no tenderness, supple, no stridor. [] 


Cardiovascular:Heart rate regular rhythm, no murmur []


Lungs & Thorax:  Bilateral breath sounds clear to auscultation []


Abdomen: Bowel sounds normal, soft, no tenderness, no masses, no pulsatile 

masses. [] 


Skin: Warm, dry, no erythema, no rash. [] 


Back: No tenderness, no CVA tenderness. [] 


Extremities: No tenderness, no cyanosis, no clubbing, ROM intact, no edema. [] 


Neurologic: Alert and oriented X 3, normal motor function, normal sensory 

function, no focal deficits noted. []


Psychologic: Affect normal, judgement normal, mood normal. []


 (CATHY GIORDANO APRN)





Current Patient Data


Vital Signs





                                   Vital Signs








  Date Time  Temp Pulse Resp B/P (MAP) Pulse Ox O2 Delivery O2 Flow Rate FiO2


 


12/16/21 15:32 98.3 92 16 166/106 (126) 98 Room Air  








 (CATHY GIORDANO APRN)





EKG


EKG


1610 interpreted by Dr. Faye sinus rhythm heart rate 81 no STEMI []


 (CATHY GIORDANO APRN)





Radiology/Procedures


Radiology/Procedures


[]PROCEDURE: CT HEAD WO CONTRAST





CT head without contrast dated 12/16/2021 3:59 PM





Comparison: None





CLINICAL INDICATION: Right-sided numbness and dizziness





TECHNIQUE:





Contiguous axial imaging of the head was performed from skull base to vertex. 


One or more of the following individualized dose reduction techniques were 

utilized for this examination:  


1. Automated exposure control  


2. Adjustment of the mA and/or kV according to patient size  


3. Use of iterative reconstruction technique.





FINDINGS:





Ventricles and sulci are within normal limits for age. No midline shift or mass 

effect. Brain parenchyma is of normal attenuation. No hemorrhage or extra-axial 

collection. Posterior fossa and brainstem unremarkable.





Visualized paranasal sinuses and mastoid air cells are clear. No apparent 

calvarial abnormality.





IMPRESSION:





No evidence of acute intracranial abnormality.





Electronically signed by: Shay Mora MD (12/16/2021 4:00 PM) [facility]














DICTATED AND SIGNED BY:     SHAY MORA MD


DATE:     12/16/21 1559





CC: MADHU HAYNES DO; CATHY GIORDANO WILLOW ~MTH0 0


 (CATHY GIORDANO APRSHERRELL)





Heart Score


C/O Chest Pain:  N/A


Risk Factors:


Risk Factors:  DM, Current or recent (<one month) smoker, HTN, HLP, family 

history of CAD, obesity.


Risk Scores:


Risk Factors:  DM, Current or recent (<one month) smoker, HTN, HLP, family 

history of CAD, obesity.


 (CATHY GIORDANO)





Course & Med Decision Making


Course & Med Decision Making


Pertinent Labs and Imaging studies reviewed. (See chart for details)





This is a 45-year-old male patient presented to the ED today to be evaluated for

 multiple complaints.  Patient is complaining of having visual change in the 

left eye that occurred while at work, symptoms resolved right away.  Is also 

complaining of a slight headache, also complaining of right upper extremity 

numbness/heaviness which occurred at work after the vision change and it is 

gone.  Also complaining of abdominal pain after eating [employer].  Patient's 

physical exam is benign.  Eye pressures were checked which were normal.  CT of 

the head is negative.  Lab work is negative.  Blood pressure was initially 

running high at 160s over low 100s.  Patient's labs are negative for any acute 

findings, EKG is negative.  Symptoms have resolved.  Discharge to home.  Follow-

up with PCP








 (CATHY GIORDANO)





Dragon Disclaimer


Dragon Disclaimer


This electronic medical record was generated, in whole or in part, using a voice

 recognition dictation system.


 (CATHY GIORDANO)





Departure


Departure:


Impression:  


   Primary Impression:  


   Headache


   Additional Impressions:  


   Visual changes


   High blood pressure


   Abdominal pain


Disposition:  01 HOME / SELF CARE / HOMELESS


Condition:  STABLE


Referrals:  


MADHU HAYNES DO (PCP)


follow up as soon as you return from your trip


Patient Instructions:  Abdominal Pain, Headache, FAQs, Visual Disturbances





Additional Instructions:  


You were evaluated in the emergency room, we did a CT of your head which is 

negative for any acute findings, your EKG is negative, your chest x-ray is 

negative, your labs are negative for any acute findings.  Please follow-up with 

your primary care doctor as well as the provided neurologist and ophthalmologist

 as soon as you return from your trip





Attending Signature


Attending Signature


I have reviewed the PA/NP's note and plan of care. I was available for 

consultation as needed during the patient's visit in the emergency department. I

 agree with the clinical impression, plan, and disposition.


 (SHAY FAYE DO)





Problem Qualifiers








   Primary Impression:  


   Headache


   Headache type:  unspecified  Headache chronicity pattern:  acute headache  

   Intractability:  not intractable  Qualified Codes:  R51.9 - Headache, 

   unspecified


   Additional Impressions:  


   High blood pressure


   Hypertension type:  unspecified  Qualified Codes:  I10 - Essential (primary) 

   hypertension


   Abdominal pain


   Abdominal location:  generalized  Qualified Codes:  R10.84 - Generalized 

   abdominal pain








CATHY GIORDANO            Dec 16, 2021 16:07


SHAY FAYE DO             Dec 16, 2021 21:52
easy to chew w/ moderately thick liquids

## 2024-06-05 NOTE — SWALLOW FEES ASSESSMENT ADULT - DIAGNOSTIC IMPRESSIONS
Severe pharyngeal dysphagia for thin and mildly thick liquids, Pt was a bit tense and uncomfortable by scope which likely impacted toleration/natural swallow reflex timing. +toleration of moderately thick, puree and easy to chew solids w/o penetration or aspiration

## 2024-06-05 NOTE — SWALLOW FEES ASSESSMENT ADULT - ROSENBEK'S PENETRATION ASPIRATION SCALE
(1) no aspiration, material does not enter airway (8) w/ thin (7) w/ mild/(8) material passes glottis, visible subglottic residue remains, absent patient response (aspiration)

## 2024-06-05 NOTE — PROGRESS NOTE ADULT - SUBJECTIVE AND OBJECTIVE BOX
24H events:    Patient is a 78y old Female who presents with a chief complaint of   Primary diagnosis of COPD exacerbation    Today is hospital day 2d. This morning patient was seen and examined at bedside, resting comfortably in bed.    No acute or major events overnight.    The patient today states that she feels better. She is pleasant and comfortable. No other symptomatic complaints.    Code Status:  DNR/DNI    Family communication:  Contact date: 6/5/24  Relationship to patient: Son  Communication details: Son agreed that she looks better today. He requested if she can be walked. Told him that I will request PT to see her today.    PAST MEDICAL & SURGICAL HISTORY  CVA (cerebral vascular accident)    Anxiety    HTN (hypertension)    COPD (chronic obstructive pulmonary disease)    S/P total abdominal hysterectomy      SOCIAL HISTORY:  Social History:      ALLERGIES:  No Known Allergies    MEDICATIONS:  STANDING MEDICATIONS  albuterol/ipratropium for Nebulization 3 milliLiter(s) Nebulizer every 6 hours  amLODIPine   Tablet 10 milliGRAM(s) Oral daily  aspirin enteric coated 81 milliGRAM(s) Oral daily  atorvastatin 10 milliGRAM(s) Oral at bedtime  budesonide 160 MICROgram(s)/formoterol 4.5 MICROgram(s) Inhaler 2 Puff(s) Inhalation two times a day  dextrose 10% Bolus 125 milliLiter(s) IV Bolus once  dextrose 5%. 1000 milliLiter(s) IV Continuous <Continuous>  dextrose 5%. 1000 milliLiter(s) IV Continuous <Continuous>  dextrose 50% Injectable 12.5 Gram(s) IV Push once  dextrose 50% Injectable 25 Gram(s) IV Push once  enoxaparin Injectable 40 milliGRAM(s) SubCutaneous every 24 hours  glucagon  Injectable 1 milliGRAM(s) IntraMuscular once  insulin lispro (ADMELOG) corrective regimen sliding scale   SubCutaneous three times a day before meals  methylPREDNISolone sodium succinate Injectable 40 milliGRAM(s) IV Push every 12 hours  pantoprazole    Tablet 40 milliGRAM(s) Oral before breakfast    PRN MEDICATIONS  albuterol    90 MICROgram(s) HFA Inhaler 2 Puff(s) Inhalation every 6 hours PRN  dextrose Oral Gel 15 Gram(s) Oral once PRN    VITALS:   T(F): 97.4  HR: 83  BP: 123/56  RR: 18  SpO2: 88%    PHYSICAL EXAM:  GENERAL:   ( X ) NAD, lying in bed comfortably     (  ) obtunded     (  ) lethargic     (  ) somnolent    HEAD:   ( X ) Atraumatic     (  ) hematoma     (  ) laceration (specify location:       )     NECK:  ( X ) Supple     (  ) neck stiffness     (  ) nuchal rigidity     (  )  no JVD     (  ) JVD present ( -- cm)    HEART:  Rate -->     ( X ) normal rate     (  ) bradycardic     (  ) tachycardic  Rhythm -->     ( X ) regular     (  ) regularly irregular     (  ) irregularly irregular  Murmurs -->     ( X ) normal s1s2     (  ) systolic murmur     (  ) diastolic murmur     (  ) continuous murmur      (  ) S3 present     (  ) S4 present    LUNGS:   ( X ) Unlabored respirations     (  ) tachypnea  ( X ) B/L air entry     (  ) decreased breath sounds in:  (location     )    (  ) no adventitious sound     (  ) crackles     ( X ) wheezing b/l diffuse     (  ) rhonchi      (specify location:       )  (  ) chest wall tenderness (specify location:       )    ABDOMEN:   ( X ) Soft     (  ) tense   |   ( X ) nondistended     (  ) distended   |   (  ) +BS     (  ) hypoactive bowel sounds     (  ) hyperactive bowel sounds  ( X ) nontender     (  ) RUQ tenderness     (  ) RLQ tenderness     (  ) LLQ tenderness     (  ) epigastric tenderness     (  ) diffuse tenderness  (  ) Splenomegaly      (  ) Hepatomegaly      (  ) Jaundice     (  ) ecchymosis     EXTREMITIES:  ( X ) Normal     (  ) Rash     (  ) ecchymosis     (  ) varicose veins      (  ) pitting edema     (  ) non-pitting edema   (  ) ulceration     (  ) gangrene:     (location:     )    NERVOUS SYSTEM:    ( X ) A&Ox3     (  ) confused     (  ) lethargic  CN II-XII:     ( X ) Intact     (  ) deficits found     (Specify:     )   Upper extremities:     ( X ) no sensorimotor deficits     (  ) weakness     (  ) loss of proprioception/vibration     (  ) loss of touch/temperature (specify:    )  Lower extremities:     ( X ) no sensorimotor deficits     (  ) weakness     (  ) loss of proprioception/vibration     (  ) loss of touch/temperature (specify:    )    LABS:                        13.9   17.10 )-----------( 246      ( 05 Jun 2024 06:30 )             42.2     06-05    141  |  104  |  30<H>  ----------------------------<  150<H>  4.6   |  28  |  0.8    Ca    9.2      05 Jun 2024 06:30  Phos  3.7     06-05  Mg     2.6     06-05    TPro  5.9<L>  /  Alb  3.5  /  TBili  0.3  /  DBili  x   /  AST  23  /  ALT  34  /  AlkPhos  140<H>  06-05    PT/INR - ( 03 Jun 2024 17:04 )   PT: 14.70 sec;   INR: 1.29 ratio      PTT - ( 03 Jun 2024 17:04 )  PTT:30.3 sec    ABG - ( 04 Jun 2024 08:39 )  pH, Arterial: 7.42  pH, Blood: x     /  pCO2: 43    /  pO2: 163   / HCO3: 28    / Base Excess: 2.9   /  SaO2: 100.0       Culture - Blood (collected 03 Jun 2024 17:02)  Source: .Blood Blood-Peripheral  Preliminary Report (04 Jun 2024 23:02):    No growth at 24 hours    Culture - Blood (collected 03 Jun 2024 17:02)  Source: .Blood Blood-Peripheral  Preliminary Report (04 Jun 2024 23:02):    No growth at 24 hours    Urinalysis with Rflx Culture (collected 03 Jun 2024 16:04)    Culture - Urine (collected 03 Jun 2024 16:04)  Source: Clean Catch None  Final Report (04 Jun 2024 22:51):    <10,000 CFU/mL Normal Urogenital Jody

## 2024-06-05 NOTE — CONSULT NOTE ADULT - ASSESSMENT
78-year-old female past medical history of COPD not on home O2, current smoker, hypertension, hyperlipidemia, CAD status post stents presents to the ED for evaluation.  Patient with multiple medical complaints, complaining of unsteadiness of gait, generalized weakness x 1 week found to have COPD exacerbation.     Introduced palliative care to patient. She states that her present quality of life is good. She has thought about how she would want to be treated if her health seriously declines and would want to make sure that she does not suffer. She is agreeable to discussing advance care planning and would like to be DNR/DNI. Discussed with her son, Dexter, on the phone who is in agreement with this decision. He also states that patient has previously completed HCP listing her daughters as HCP. He will try to bring in a copy.     Plan:  - symptoms per primary team  - GOC: live longer while maintaining quality of life.   - DNR/DNI    - MOLST complete 6/5    >16 minutes spent F2F on advanced care planning with patient. Explained aspects of advanced care planning (code status, intubation).     Palliative care will continue to peripherally.   Please call [phone] with questions or concerns 24/7.   _____________  Trino Ferreira MD  Palliative Medicine  [facility]   of Geriatric and Palliative Medicine  [phone]    78-year-old female past medical history of COPD not on home O2, current smoker, hypertension, hyperlipidemia, CAD status post stents presents to the ED for evaluation.  Patient with multiple medical complaints, complaining of unsteadiness of gait, generalized weakness x 1 week found to have COPD exacerbation.     Introduced palliative care to patient. She states that her present quality of life is good. She has thought about how she would want to be treated if her health seriously declines and would want to make sure that she does not suffer. She referenced medical aid in dying but understands that this is not possible in the state [facility]. She is agreeable to discussing advance care planning and would like to be DNR/DNI. Discussed with her son, Dexter, on the phone who is in agreement with this decision. He also states that patient has previously completed HCP listing her daughters as HCP. He will try to bring in a copy.     Plan:  - symptoms per primary team  - GOC: live longer while maintaining quality of life.   - DNR/DNI    - MOLST complete 6/5    >16 minutes spent F2F on advanced care planning with patient. Explained aspects of advanced care planning (code status, intubation).     Palliative care will continue to peripherally.   Please call [phone] with questions or concerns 24/7.   _____________  Trino Ferreira MD  Palliative Medicine  [facility]   of Geriatric and Palliative Medicine  [phone]

## 2024-06-05 NOTE — PHYSICAL THERAPY INITIAL EVALUATION ADULT - ASSISTIVE DEVICE FOR TRANSFER: BED/CHAIR, REHAB EVAL
Nutrition Services Progress Note    Physician: Joanne Bailey MD; Shan Gillespie MD  Diagnosis: Cervical Cancer  Treatment:  Chemoradiation with carboplatin (start 8/23/23)    Food and Nutrition Related History:  - Met with pt in XRT today. Daughter present, sleeping during most of encounter. Dr Bailey and JAZMIN Dee partially present as well.   - Pt endorses watery/loose stools (occur every time she uses the bathroom, >3 daily). Feels most dependent on type of foods consumed.    - Pt endorses variable appetite - depends on her mood and activities surrounding her. Reports her mother does all her cooking and follows a very heart healthy meal plan, limiting salt and fried foods. Endorses most days grazing all day on 1 plate of food. Yesterday reports ate only plate of white rice with gravy from 3pm on. Typically does not eat breakfast, but would like to start. This date consumed air fried fox and 2 pieces of dry wheat toast.   - For fluids, due to cardiac concerns, tries not to over-drink. Yesterday consumed two 12 oz containers of lemonade and some water.   - Does not own a scale and is unable to weigh self daily, but feels has maintained wt of late  - No other nutrition related concerns identified    Anthropometric Measurements:  Estimated body mass index is 34.89 kg/m² as calculated from the following:    Height as of 8/27/23: 5' 5\" (1.651 m).    Weight as of 9/6/23: 95.1 kg (209 lb 10.5 oz).    Wt Readings from Last 5 Encounters:   09/06/23 95.1 kg (209 lb 10.5 oz)   08/27/23 101.5 kg (223 lb 12.3 oz)   08/24/23 103.8 kg (228 lb 13.4 oz)   08/11/23 103.8 kg (228 lb 13.4 oz)   08/02/23 102.2 kg (225 lb 4 oz)     Usual Weight:  225-235 lbs over past year - some related to fluid fluctuations  Weight Change:  Overall 19 lbs/8.3% wt loss in past 1 month, some due to fluid    Biochemical Data, Medical Tests, and Procedures:  - 8/24-9/6 Hospital admission: acute on chronic CHF, nonischemic cardiomyopathy  - Noted hx  of DM2 with insulin    Nutrition-Focused Physical Findings:  Overall appearance: No fat or muscle wasting visually observed    Comparative Standards:  Estimated energy needs -  Total energy estimated needs (CS-1.1.1):   Calculation Weight: 95.1 kg  Calorie needs: 8970-0140 (16 - 22 kcal/kg)  Protein needs:  (1-1.2 g/kg)    Nutrition Diagnosis:  Inadequate intake related to varied appetite secondary to mental health, malignancy with diarrhea due to treatment as evidenced by consuming <75% estimated needs, having >3 diarrheal episodes daily.    Intervention:  Nutrition relationship to health/disease:       - Provided and reviewed handouts: Diarrhea and Fiber for Bowel Management. Encouraged breakfast daily with oatmeal, banana and creamy peanut butter. Supported avoiding large portions at once as this may exacerbate diarrhea. Discussed small frequent meals q 2-3 hours (nibbling from 1 plate of food throughout day may not provide enough kcal for the day). Further examples provided. Supported limiting fried foods, fatty foods.   - Recommended achieve 64 oz non-caffeinated fluids daily for hydration but to avoid overhydration with diuretic/CHF.     Medications:   Per Dr Baliey recommendations - avoid imodium given cardiac hx; trial Pepto Bismol or Kaopectate for diarrhea.     Commercial beverage:   Did not enjoy ONS provided in hospital - does not desire to consume at this time but understands may need to readdress in the future.    Monitoring and Evaluation:  Total energy intake:    Type of food/meals:  Goal to tolerate po diet. Goal to meet nutrient needs to maintain wt. - not achieving goal this date     rolling walker

## 2024-06-05 NOTE — CONSULT NOTE ADULT - SUBJECTIVE AND OBJECTIVE BOX
HPI:  78-year-old female past medical history of COPD not on home O2, current smoker, hypertension, hyperlipidemia, CAD status post stents presents to the ED for evaluation.  Patient with multiple medical complaints, complaining of unsteadiness of gait, generalized weakness x 1 week.  Son at bedside providing additional history, reports that patient was supposed to be set up with home oxygen as she been having worsening shortness of breath as well over the last several weeks but never got the prescription.  Associated cough, wheezing and urinary frequency.  Denies any chest pain, nausea, vomiting, diarrhea, fevers, chills.    Pt is an active smoker, smoked 1 pack a day  AA x 2 at encounter, but alert and responds all questions appropriately  tried calling son for further history and medrec, w no response    in ED  VS spo2 92 on 3L  wbc 14.3  bili 1.5  AST/ALT 76/46  Ak phos 155  vbg: pH 7.3 / pCO2 58   UA ng  rvp NG      CTH and CT AP: no acute path   (03 Jun 2024 22:28)    Patient complaining of disequilibrium. Otherwise feels well.     PERTINENT PM/SXH:   CVA (cerebral vascular accident)    Anxiety    HTN (hypertension)    COPD (chronic obstructive pulmonary disease)      S/P total abdominal hysterectomy      FAMILY HISTORY:  No pertinent family history in first degree relatives      ITEMS NOT CHECKED ARE NOT PRESENT    SOCIAL HISTORY:   Significant other/partner[ ]  Children[x ]  [employer]/Spirituality:  Substance hx:  [ ]   Tobacco hx:  [ ]   Alcohol hx: [ ]   Living Situation: [ ]Home  [ ]Long term care  [ ]Rehab [ ]Other  Home Services: [ ] HHA [ ] Visting RN [ ] Hospice  Occupation:  Home Opioid hx:  [ ] Y [ ] N [ ] I-Stop Reference No:     ADVANCE DIRECTIVES:    [ ] Full Code [ ] DNR  MOLST  [ ]  Living Will  [ ]   DECISION MAKER(s):  [ ] Health Care Proxy(s)  [ ] Surrogate(s)  [ ] Guardian           Name(s): Phone Number(s):    BASELINE (I)ADL(s) (prior to admission):  [place]: [ ]Total  [ ] Moderate [ ]Dependent  Palliative Performance Status Version 2:         %    http://npcrc.org/files/news/palliative_performance_scale_ppsv2.pdf    Allergies    No Known Allergies    Intolerances    MEDICATIONS  (STANDING):  albuterol/ipratropium for Nebulization 3 milliLiter(s) Nebulizer every 6 hours  amLODIPine   Tablet 10 milliGRAM(s) Oral daily  aspirin enteric coated 81 milliGRAM(s) Oral daily  atorvastatin 10 milliGRAM(s) Oral at bedtime  budesonide 160 MICROgram(s)/formoterol 4.5 MICROgram(s) Inhaler 2 Puff(s) Inhalation two times a day  dextrose 10% Bolus 125 milliLiter(s) IV Bolus once  dextrose 5%. 1000 milliLiter(s) (100 mL/Hr) IV Continuous <Continuous>  dextrose 5%. 1000 milliLiter(s) (50 mL/Hr) IV Continuous <Continuous>  dextrose 50% Injectable 12.5 Gram(s) IV Push once  dextrose 50% Injectable 25 Gram(s) IV Push once  enoxaparin Injectable 40 milliGRAM(s) SubCutaneous every 24 hours  glucagon  Injectable 1 milliGRAM(s) IntraMuscular once  insulin lispro (ADMELOG) corrective regimen sliding scale   SubCutaneous three times a day before meals  methylPREDNISolone sodium succinate Injectable 40 milliGRAM(s) IV Push every 12 hours  pantoprazole    Tablet 40 milliGRAM(s) Oral before breakfast    MEDICATIONS  (PRN):  albuterol    90 MICROgram(s) HFA Inhaler 2 Puff(s) Inhalation every 6 hours PRN Bronchospasm  dextrose Oral Gel 15 Gram(s) Oral once PRN Blood Glucose LESS THAN 70 milliGRAM(s)/deciliter      PRESENT SYMPTOMS: [ ]Unable to obtain due to poor mentation   Source if other than patient:  [ ]Family   [ ]Team     Pain: [ ]yes [x ]no  QOL impact -   Location -                    Aggravating factors -  Alleviating factors -   Quality -  Radiation -  Timing -   Severity (0-10 scale):  Minimal acceptable level (0-10 scale):     CPOT:    https://www.sccm.org/getattachment/sad92r63-0s2h-3u0j-5x5o-3425x7753k6r/Critical-Care-Pain-Observation-Tool-(CPOT)    PAIN AD Score:   http://geriatrictoolkit.[place].[facility]/cog/painad.pdf     Dyspnea:                           [x ]None[ ]Mild [ ]Moderate [ ]Severe     Respiratory Distress Observation Scale (RDOS):   A score of 0 to 2 signifies little or no respiratory distress, 3 signifies mild distress, scores 4 to 6 indicate moderate distress, and scores greater than 7 signify severe distress  https://www.[facility].ca/sites/default/files/PDFS/903563-waawvonyykr-zzqvwcmo-sokbegihpxd-ezdtd.pdf    Anxiety:                             [ ]None[ ]Mild [ ]Moderate [ ]Severe   Fatigue:                             [ ]None[ ]Mild [ ]Moderate [ ]Severe   Nausea:                             [ ]None[ ]Mild [ ]Moderate [ ]Severe   Loss of appetite:              [ ]None[ ]Mild [ ]Moderate [ ]Severe   Constipation:                    [ ]None[ ]Mild [ ]Moderate [ ]Severe    Other Symptoms:  [x ]All other review of systems negative     Palliative Performance Status Version 2:         %    http://[facility].org/files/news/palliative_performance_scale_ppsv2.pdf  PHYSICAL EXAM:  Vital Signs Last 24 Hrs  T(C): 36.3 (05 Jun 2024 12:06), Max: 36.6 (05 Jun 2024 05:59)  T(F): 97.4 (05 Jun 2024 12:06), Max: 97.8 (05 Jun 2024 05:59)  HR: 83 (05 Jun 2024 12:06) (62 - 84)  BP: 112/53 (05 Jun 2024 12:06) (112/53 - 131/67)  BP(mean): --  RR: 18 (05 Jun 2024 12:06) (16 - 19)  SpO2: 92% (05 Jun 2024 12:06) (92% - 98%)    Parameters below as of 05 Jun 2024 05:59  Patient On (Oxygen Delivery Method): nasal cannula  O2 Flow (L/min): 3   I&O's Summary    04 Jun 2024 07:01  -  05 Jun 2024 07:00  --------------------------------------------------------  IN: 0 mL / OUT: 300 mL / NET: -300 mL        GENERAL:  NAD   PULMONARY:  Non labored breathing  NEUROLOGIC: Grossly intact  BEHAVIORAL/PSYCH:  Calm.     CRITICAL CARE:  [ ] Shock Present  [ ]Septic [ ]Cardiogenic [ ]Neurologic [ ]Hypovolemic  [ ]  Vasopressors [ ]  Inotropes   [ ]Respiratory failure present [ ]Mechanical ventilation [ ]Non-invasive ventilatory support [ ]High flow  [ ]Acute  [ ]Chronic [ ]Hypoxic  [ ]Hypercarbic [ ]Other  [ ]Other organ failure     LABS: I have reviewed daily labs                        13.9   17.10 )-----------( 246      ( 05 Jun 2024 06:30 )             42.2   06-05    141  |  104  |  30<H>  ----------------------------<  150<H>  4.6   |  28  |  0.8    Ca    9.2      05 Jun 2024 06:30  Phos  3.7     06-05  Mg     2.6     06-05    TPro  5.9<L>  /  Alb  3.5  /  TBili  0.3  /  DBili  x   /  AST  23  /  ALT  34  /  AlkPhos  140<H>  06-05  PT/INR - ( 03 Jun 2024 17:04 )   PT: 14.70 sec;   INR: 1.29 ratio         PTT - ( 03 Jun 2024 17:04 )  PTT:30.3 sec    RADIOLOGY & ADDITIONAL STUDIES: I have reviewed new imaging    PROTEIN CALORIE MALNUTRITION PRESENT: [ ]mild [ ]moderate [ ]severe [ ]underweight [ ]morbid obesity  https://www.andeal.org/vault/2440/web/files/ONC/Table_Clinical%20Characteristics%20to%20Document%20Malnutrition-White%20JV%20et%20al%202012.pdf    Height (cm): 165.1 (06-03-24 @ 15:37)  Weight (kg): 64.864 (06-03-24 @ 15:37)  BMI (kg/m2): 23.8 (06-03-24 @ 15:37)    [ ]PPSV2 < or = to 30% [ ]significant weight loss  [ ]poor nutritional intake  [ ]anasarca      [ ]Artificial Nutrition      Palliative Care Spiritual/Emotional Screening Tool Question  Severity (0-4):                    OR                    [ x] Unable to determine. Will assess at later time if appropriate.  Score of 2 or greater indicates recommendation of Chaplaincy and/or SW referral  Chaplaincy Referral: [ ] Yes [ ] Refused [ ] Following     Caregiver [place]:  [ ] Yes [ ] No    OR    [x ] Unable to determine. Will assess at later time if appropriate.  Social Work Referral [ ]  Patient and Family Centered Care Referral [ ]    Anticipatory Grief Present: [ ] Yes [ ] No    OR     [ x] Unable to determine. Will assess at later time if appropriate.  Social Work Referral [ ]  Patient and Family Centered Care Referral [ ]    REFERRALS:   [ ]Chaplaincy  [ ]Hospice  [ ]Child Life  [ ]Social Work  [ ]Case management [ ]Holistic Therapy     Palliative care education provided to patient and/or family

## 2024-06-05 NOTE — SWALLOW FEES ASSESSMENT ADULT - ASPIRATION PRECAUTIONS
[employer]    Procedure: Right L4-5 TF GREGOR    Date/Time: 8/10/2020 2:49 PM  Performed by: Anuradha Wray PA-C  Authorized by: Anuradha Wray PA-C     UNIVERSAL PROTOCOL   Site Marked: Yes  Prior Images Obtained and Reviewed:  Yes  Required items: Required blood products, implants, devices and special equipment available    Patient identity confirmed:  Verbally with patient  NA - No sedation, light sedation, or local anesthesia  Confirmation Checklist:  Patient's identity using two indicators, relevant allergies, procedure was appropriate and matched the consent or emergent situation and correct equipment/implants were available  Time out: Immediately prior to the procedure a time out was called    Universal Protocol: the Joint Commission Universal Protocol was followed    Preparation: Patient was prepped and draped in usual sterile fashion           ANESTHESIA    Anesthesia: Local infiltration  Local Anesthetic:  Lidocaine 1% without epinephrine      SEDATION    Patient Sedated: No    See dictated procedure note for full details.  PROCEDURE   Patient Tolerance:  Patient tolerated the procedure well with no immediate complications    Length of time physician/provider present for 1:1 monitoring during sedation: 0       yes

## 2024-06-05 NOTE — SWALLOW BEDSIDE ASSESSMENT ADULT - COMMENTS
RN reports toleration of breakfast meal.  +elevated WBC.
RN reports chocking episodes with breakfast meal.

## 2024-06-05 NOTE — SWALLOW FEES ASSESSMENT ADULT - ADDITIONAL RECOMMENDATIONS
SLP will f/u w/ VFSS at end of week which will allow pt to be in a more relaxed state to get a truer picture of her swallow function and airway protection.

## 2024-06-05 NOTE — PROGRESS NOTE ADULT - ASSESSMENT
78-year-old female past medical history of COPD not on home O2, current smoker, hypertension, hyperlipidemia, CAD status post stents presents to the ED for evaluation.     #Acute on chronic COPD exacerbation  #acute hypercapneic respiratory failure  - NCHCT wnl  - VBG improved after bipap overnight with CO2 46  - continue solumedrol for now as she is wheezing  - ppi and ISS  - continue duoneb and symbicort  - stop azithromycin  - bipap overnight  - PT consult: patient was on bipap when PT came on 6/4, f/u on 6/5.  - Palliative consult    #Dysphagia  - Frequent coughing while eating per son. Also had coughing bout on 6/4 during breakfast.  - S/s eval 6/4: easy to chew w/ mildly thick liquids.  - S/s recommends FEES, continue current diet for now.    #CAD sp PCI in 2019  #HLD   cont Aspirin  - resume statin (elevated ALP is not a CI)  - Follows cardiologist at [facility].  - States her stents were placed > 5 years ago.    #HTN  cont amlodipine    #Elevated glucose  - Not diabetic (per patient), ordered ISS and fingersticks given that patient is on steroid for above issue.    dvt ppx: lovenox  gi ppx: protonix  activity: AAT  diet: DASH    Med rec:  Patient's son brought meds on 6/4. Had bottles only for atorva 10 qhs and amlodipine 10 q am and Xanax 0.5 prn (receives 15 pills/month from her doctor). Patient and son state that she does not take any other meds. Will clarify about ASA, Plavix and Carbamezapine specifically. Pharmacy is [address]  On 6/5, requested patient to ask her son to bring a list of meds that she takes (she states she has a list of meds at home).  Called pharmacy, no response.

## 2024-06-05 NOTE — CONSULT NOTE ADULT - ASSESSMENT
ASSESSMENT  78-year-old female past medical history of COPD not on home O2, current smoker, hypertension, hyperlipidemia, CAD status post stents presents to the ED for evaluation.  Patient with multiple medical complaints, complaining of unsteadiness of gait, generalized weakness x 1 week.  Son at bedside providing additional history, reports that patient was supposed to be set up with home oxygen as she been having worsening shortness of breath as well over the last several weeks but never got the prescription.       IMPRESSION  #Leukocytosis, on steroids  #No evidence of PNA    CXR No radiographic evidence of acute cardiopulmonary disease.    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 All final recommendations to follow after interview and examination of the patient. Please follow recommendations noted below.    If any questions, please send a message or call on [facility] [employer] [facility]  Please continue to update ID with any pertinent new laboratory, radiographic findings, or change in clinical status   ASSESSMENT  78-year-old female past medical history of COPD not on home O2, current smoker, hypertension, hyperlipidemia, CAD status post stents presents to the ED for evaluation.  Patient with multiple medical complaints, complaining of unsteadiness of gait, generalized weakness x 1 week.  Son at bedside providing additional history, reports that patient was supposed to be set up with home oxygen as she been having worsening shortness of breath as well over the last several weeks but never got the prescription.       IMPRESSION  #COPD in exacerbation    CXR No radiographic evidence of acute cardiopulmonary disease.    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 Please inform ID of any patient clinical change or any new pertinent laboratory or radiographic data     If any questions, please send a message or call on [employer]  Please continue to update ID with any pertinent new laboratory, radiographic findings, or change in clinical status

## 2024-06-05 NOTE — PHYSICAL THERAPY INITIAL EVALUATION ADULT - PERTINENT HX OF CURRENT PROBLEM, REHAB EVAL
78-year-old female past medical history of COPD not on home O2, current smoker, hypertension, hyperlipidemia, CAD status post stents presents to the ED for evaluation.

## 2024-06-05 NOTE — CONSULT NOTE ADULT - SUBJECTIVE AND OBJECTIVE BOX
DESTINY TONEY  78y, Female  Allergy: No Known Allergies      CHIEF COMPLAINT:       LOS  2d    HPI  HPI:  78-year-old female past medical history of COPD not on home O2, current smoker, hypertension, hyperlipidemia, CAD status post stents presents to the ED for evaluation.  Patient with multiple medical complaints, complaining of unsteadiness of gait, generalized weakness x 1 week.  Son at bedside providing additional history, reports that patient was supposed to be set up with home oxygen as she been having worsening shortness of breath as well over the last several weeks but never got the prescription.  Associated cough, wheezing and urinary frequency.  Denies any chest pain, nausea, vomiting, diarrhea, fevers, chills.    Pt is an active smoker, smoked 1 pack a day  AA x 2 at encounter, but alert and responds all questions appropriately  tried calling son for further history and medrec, w no response    in ED  VS spo2 92 on 3L  wbc 14.3  bili 1.5  AST/ALT 76/46  Ak phos 155  vbg: pH 7.3 / pCO2 58   UA ng  rvp NG      CTH and CT AP: no acute path   (03 Jun 2024 22:28)      INFECTIOUS DISEASE HISTORY:  ID consulted for concern for viral PNA, need for RVP     Currently ordered for:      PMH  PAST MEDICAL & SURGICAL HISTORY:  CVA (cerebral vascular accident)      Anxiety      HTN (hypertension)      COPD (chronic obstructive pulmonary disease)      S/P total abdominal hysterectomy          FAMILY HISTORY  No pertinent family history in first degree relatives        SOCIAL HISTORY  Social History:        ROS  ***    VITALS:  T(F): 97.8, Max: 97.8 (06-05-24 @ 05:59)  HR: 78  BP: 115/76  RR: 18Vital Signs Last 24 Hrs  T(C): 36.6 (05 Jun 2024 05:59), Max: 36.6 (05 Jun 2024 05:59)  T(F): 97.8 (05 Jun 2024 05:59), Max: 97.8 (05 Jun 2024 05:59)  HR: 78 (05 Jun 2024 05:59) (62 - 84)  BP: 115/76 (05 Jun 2024 05:59) (115/76 - 131/67)  BP(mean): --  RR: 18 (05 Jun 2024 05:59) (16 - 19)  SpO2: 92% (05 Jun 2024 05:59) (83% - 98%)    Parameters below as of 05 Jun 2024 05:59  Patient On (Oxygen Delivery Method): nasal cannula  O2 Flow (L/min): 3      PHYSICAL EXAM:  ***    TESTS & MEASUREMENTS:                        13.9   17.10 )-----------( 246      ( 05 Jun 2024 06:30 )             42.2     06-04    137  |  98  |  19  ----------------------------<  195<H>  4.4   |  26  |  0.8    Ca    9.1      04 Jun 2024 07:49    TPro  6.2  /  Alb  3.7  /  TBili  0.5  /  DBili  x   /  AST  25  /  ALT  35  /  AlkPhos  135<H>  06-04      LIVER FUNCTIONS - ( 04 Jun 2024 07:49 )  Alb: 3.7 g/dL / Pro: 6.2 g/dL / ALK PHOS: 135 U/L / ALT: 35 U/L / AST: 25 U/L / GGT: x           Urinalysis Basic - ( 04 Jun 2024 07:49 )    Color: x / Appearance: x / SG: x / pH: x  Gluc: 195 mg/dL / Ketone: x  / Bili: x / Urobili: x   Blood: x / Protein: x / Nitrite: x   Leuk Esterase: x / RBC: x / WBC x   Sq Epi: x / Non Sq Epi: x / Bacteria: x        Culture - Blood (collected 06-03-24 @ 17:02)  Source: .Blood Blood-Peripheral  Preliminary Report (06-04-24 @ 23:02):    No growth at 24 hours    Culture - Blood (collected 06-03-24 @ 17:02)  Source: .Blood Blood-Peripheral  Preliminary Report (06-04-24 @ 23:02):    No growth at 24 hours    Urinalysis with Rflx Culture (collected 06-03-24 @ 16:04)    Culture - Urine (collected 06-03-24 @ 16:04)  Source: Clean Catch None  Final Report (06-04-24 @ 22:51):    <10,000 CFU/mL Normal Urogenital Jody    Culture - Urine (collected 03-07-24 @ 19:37)  Source: Clean Catch Clean Catch (Midstream)  Final Report (03-09-24 @ 11:05):    <10,000 CFU/mL Normal Urogenital Jody        Blood Gas Venous - Lactate: 0.8 mmol/L (06-03-24 @ 19:10)      INFECTIOUS DISEASES TESTING  Procalcitonin: 0.10 ng/mL (06-04-24 @ 00:09)  Hepatitis B Surface Antigen: Nonreact (06-04-24 @ 00:09)  Hepatitis C Virus Interpretation: Nonreact (06-04-24 @ 00:09)      INFLAMMATORY MARKERS  Sedimentation Rate, Erythrocyte: 33 mm/Hr (06-04-24 @ 00:09)      RADIOLOGY & ADDITIONAL TESTS:  I have personally reviewed the last Chest xray  CXR  Xray Chest 1 View- PORTABLE-Urgent:   ACC: [id number] EXAM:  XR CHEST PORTABLE URGENT 1V   ORDERED BY: NISHI YOUNGER     PROCEDURE DATE:  06/03/2024          INTERPRETATION:  Clinical History / Reason for exam: Sepsis.    Comparison : Chest radiograph 3/7/2024.    Technique/Positioning: Single frontal chest x-ray obtained.    Findings:    Support devices: None.    Cardiac/mediastinum/hilum: Unchanged.    Lung parenchyma/Pleura: Within normal limits.    Skeleton/soft tissues: Unchanged.    Impression:    No radiographic evidence of acute cardiopulmonary disease.        --- End of Report ---            ERIN BARRETT MD; Attending Radiologist  This document has been electronically signed. Jun 4 2024 12:35AM (06-03-24 @ 16:14)      CT  CT Abdomen and Pelvis w/ IV Cont:   ACC: [id number] EXAM:  CT ABDOMEN AND PELVIS IC   ORDERED BY: NISHI YOUNGER     PROCEDURE DATE:  06/03/2024          INTERPRETATION:  CLINICAL STATEMENT: Abdominal pain. abd ttp, dont wait   for labs    TECHNIQUE: Contiguous axial CT images were obtained of the abdomen and   pelvis following administration of intravenous contrast.  Oral contrast   was not administered. Reformatted images in the coronal and sagittal   planes were acquired.    COMPARISON CT: None    FINDINGS:    LOWER CHEST: Bibasilar subsegmental atelectasis.    HEPATOBILIARY: Prior cholecystectomy.    SPLEEN: Within normal limits.    ADRENALS: Within normal limits.    PANCREAS: Within normal limits.    KIDNEYS: Symmetric renal enhancement. No hydronephrosis. There are renal   hypodensities which are too small to characterize.    ABDOMINOPELVIC NODES: 1.0 cm left para-aortic lymph node. Additional   subcentimeter lymph nodes    PELVIC ORGANS: Underdistended urinary bladder, limiting evaluation.    PERITONEUM/MESENTERY/BOWEL: No bowel obstruction, ascites or free   intraperitoneal air. Postsurgical change of the colon The appendix is not   definitely identified, however there is no pericecal inflammation to   suggest acute appendicitis.    BONES/SOFT TISSUES: Degenerative changes of the spine.      IMPRESSION:  No evidence of acute/inflammatory process in the abdomen or pelvis.    1.0 cm left para-aortic lymph node. Additional subcentimeter pelvic lymph   nodes. These are of unclear etiology. Recommend follow-upto exclude   malignancy    --- End of Report ---            KEDAR SUNSHINE MD; Attending Radiologist  This document has been electronically signed. Silas  3 2024  6:57PM (06-03-24 @ 17:42)      CARDIOLOGY TESTING  12 Lead ECG:   Ventricular Rate 99 BPM    Atrial Rate 99 BPM    P-R Interval 120 ms    QRS Duration 76 ms    Q-T Interval 334 ms    QTC Calculation(Bazett) 428 ms    P Axis 84 degrees    R Axis 53 degrees    T Axis 3 degrees    Diagnosis Line Sinus rhythm with frequent Premature ventricular complexes  Nonspecific ST and T wave abnormality  Abnormal ECG    Confirmed by Sy Muñoz ([id number]) on 6/4/2024 11:00:35 AM (06-03-24 @ 16:16)      MEDICATIONS  albuterol/ipratropium for Nebulization 3 Nebulizer every 6 hours  amLODIPine   Tablet 10 Oral daily  aspirin enteric coated 81 Oral daily  atorvastatin 10 Oral at bedtime  budesonide 160 MICROgram(s)/formoterol 4.5 MICROgram(s) Inhaler 2 Inhalation two times a day  dextrose 10% Bolus 125 IV Bolus once  dextrose 5%. 1000 IV Continuous <Continuous>  dextrose 5%. 1000 IV Continuous <Continuous>  dextrose 50% Injectable 12.5 IV Push once  dextrose 50% Injectable 25 IV Push once  enoxaparin Injectable 40 SubCutaneous every 24 hours  glucagon  Injectable 1 IntraMuscular once  insulin lispro (ADMELOG) corrective regimen sliding scale  SubCutaneous three times a day before meals  methylPREDNISolone sodium succinate Injectable 40 IV Push every 12 hours  pantoprazole    Tablet 40 Oral before breakfast        ANTIBIOTICS:      ALLERGIES:  No Known Allergies         DESTINY TONEY  78y, Female  Allergy: No Known Allergies      CHIEF COMPLAINT:       LOS  2d    HPI  HPI:  78-year-old female past medical history of COPD not on home O2, current smoker, hypertension, hyperlipidemia, CAD status post stents presents to the ED for evaluation.  Patient with multiple medical complaints, complaining of unsteadiness of gait, generalized weakness x 1 week.  Son at bedside providing additional history, reports that patient was supposed to be set up with home oxygen as she been having worsening shortness of breath as well over the last several weeks but never got the prescription.  Associated cough, wheezing and urinary frequency.  Denies any chest pain, nausea, vomiting, diarrhea, fevers, chills.    Pt is an active smoker, smoked 1 pack a day  AA x 2 at encounter, but alert and responds all questions appropriately  tried calling son for further history and medrec, w no response    in ED  VS spo2 92 on 3L  wbc 14.3  bili 1.5  AST/ALT 76/46  Ak phos 155  vbg: pH 7.3 / pCO2 58   UA ng  rvp NG      CTH and CT AP: no acute path   (03 Jun 2024 22:28)      INFECTIOUS DISEASE HISTORY:  ID consulted for concern for viral PNA, need for RVP   Pt reports SOB< denies sore throat, rhinorrhea, productive cough, fever, chills, sick contacts    Currently ordered for: no antibiotics       PMH  PAST MEDICAL & SURGICAL HISTORY:  CVA (cerebral vascular accident)      Anxiety      HTN (hypertension)      COPD (chronic obstructive pulmonary disease)      S/P total abdominal hysterectomy          FAMILY HISTORY  No pertinent family history in first degree relatives        SOCIAL HISTORY  Social History:        ROS  General: Denies rigors, nightsweats  HEENT: Denies headache, rhinorrhea, sore throat, eye pain  CV: Denies CP, palpitations  PULM: as noted above   GI: Denies hematemesis, hematochezia, melena  : Denies discharge, hematuria  MSK: Denies arthralgias, myalgias  SKIN: Denies rash, lesions  NEURO: Denies paresthesias, weakness  PSYCH: Denies depression, anxiety     VITALS:  T(F): 97.8, Max: 97.8 (06-05-24 @ 05:59)  HR: 78  BP: 115/76  RR: 18Vital Signs Last 24 Hrs  T(C): 36.6 (05 Jun 2024 05:59), Max: 36.6 (05 Jun 2024 05:59)  T(F): 97.8 (05 Jun 2024 05:59), Max: 97.8 (05 Jun 2024 05:59)  HR: 78 (05 Jun 2024 05:59) (62 - 84)  BP: 115/76 (05 Jun 2024 05:59) (115/76 - 131/67)  BP(mean): --  RR: 18 (05 Jun 2024 05:59) (16 - 19)  SpO2: 92% (05 Jun 2024 05:59) (83% - 98%)    Parameters below as of 05 Jun 2024 05:59  Patient On (Oxygen Delivery Method): nasal cannula  O2 Flow (L/min): 3      PHYSICAL EXAM:  Gen: NAD, resting in bed  HEENT: Normocephalic, atraumatic  Neck: supple, no lymphadenopathy  CV: Regular rate & regular rhythm  Lungs: +wheezing  Abdomen: Soft, BS present  Ext: Warm, well perfused  Neuro: non focal, awake  Skin: no rash, no erythema  Lines: no phlebitis     TESTS & MEASUREMENTS:                        13.9   17.10 )-----------( 246      ( 05 Jun 2024 06:30 )             42.2     06-04    137  |  98  |  19  ----------------------------<  195<H>  4.4   |  26  |  0.8    Ca    9.1      04 Jun 2024 07:49    TPro  6.2  /  Alb  3.7  /  TBili  0.5  /  DBili  x   /  AST  25  /  ALT  35  /  AlkPhos  135<H>  06-04      LIVER FUNCTIONS - ( 04 Jun 2024 07:49 )  Alb: 3.7 g/dL / Pro: 6.2 g/dL / ALK PHOS: 135 U/L / ALT: 35 U/L / AST: 25 U/L / GGT: x           Urinalysis Basic - ( 04 Jun 2024 07:49 )    Color: x / Appearance: x / SG: x / pH: x  Gluc: 195 mg/dL / Ketone: x  / Bili: x / Urobili: x   Blood: x / Protein: x / Nitrite: x   Leuk Esterase: x / RBC: x / WBC x   Sq Epi: x / Non Sq Epi: x / Bacteria: x        Culture - Blood (collected 06-03-24 @ 17:02)  Source: .Blood Blood-Peripheral  Preliminary Report (06-04-24 @ 23:02):    No growth at 24 hours    Culture - Blood (collected 06-03-24 @ 17:02)  Source: .Blood Blood-Peripheral  Preliminary Report (06-04-24 @ 23:02):    No growth at 24 hours    Urinalysis with Rflx Culture (collected 06-03-24 @ 16:04)    Culture - Urine (collected 06-03-24 @ 16:04)  Source: Clean Catch None  Final Report (06-04-24 @ 22:51):    <10,000 CFU/mL Normal Urogenital Jody    Culture - Urine (collected 03-07-24 @ 19:37)  Source: Clean Catch Clean Catch (Midstream)  Final Report (03-09-24 @ 11:05):    <10,000 CFU/mL Normal Urogenital Jody        Blood Gas Venous - Lactate: 0.8 mmol/L (06-03-24 @ 19:10)      INFECTIOUS DISEASES TESTING  Procalcitonin: 0.10 ng/mL (06-04-24 @ 00:09)  Hepatitis B Surface Antigen: Nonreact (06-04-24 @ 00:09)  Hepatitis C Virus Interpretation: Nonreact (06-04-24 @ 00:09)      INFLAMMATORY MARKERS  Sedimentation Rate, Erythrocyte: 33 mm/Hr (06-04-24 @ 00:09)      RADIOLOGY & ADDITIONAL TESTS:  I have personally reviewed the last Chest xray  CXR  Xray Chest 1 View- PORTABLE-Urgent:   ACC: [id number] EXAM:  XR CHEST PORTABLE URGENT 1V   ORDERED BY: NISHI YOUNGER     PROCEDURE DATE:  06/03/2024          INTERPRETATION:  Clinical History / Reason for exam: Sepsis.    Comparison : Chest radiograph 3/7/2024.    Technique/Positioning: Single frontal chest x-ray obtained.    Findings:    Support devices: None.    Cardiac/mediastinum/hilum: Unchanged.    Lung parenchyma/Pleura: Within normal limits.    Skeleton/soft tissues: Unchanged.    Impression:    No radiographic evidence of acute cardiopulmonary disease.        --- End of Report ---            ERIN BARRETT MD; Attending Radiologist  This document has been electronically signed. Jun 4 2024 12:35AM (06-03-24 @ 16:14)      CT  CT Abdomen and Pelvis w/ IV Cont:   ACC: [id number] EXAM:  CT ABDOMEN AND PELVIS IC   ORDERED BY: NISHI YOUNGER     PROCEDURE DATE:  06/03/2024          INTERPRETATION:  CLINICAL STATEMENT: Abdominal pain. abd ttp, dont wait   for labs    TECHNIQUE: Contiguous axial CT images were obtained of the abdomen and   pelvis following administration of intravenous contrast.  Oral contrast   was not administered. Reformatted images in the coronal and sagittal   planes were acquired.    COMPARISON CT: None    FINDINGS:    LOWER CHEST: Bibasilar subsegmental atelectasis.    HEPATOBILIARY: Prior cholecystectomy.    SPLEEN: Within normal limits.    ADRENALS: Within normal limits.    PANCREAS: Within normal limits.    KIDNEYS: Symmetric renal enhancement. No hydronephrosis. There are renal   hypodensities which are too small to characterize.    ABDOMINOPELVIC NODES: 1.0 cm left para-aortic lymph node. Additional   subcentimeter lymph nodes    PELVIC ORGANS: Underdistended urinary bladder, limiting evaluation.    PERITONEUM/MESENTERY/BOWEL: No bowel obstruction, ascites or free   intraperitoneal air. Postsurgical change of the colon The appendix is not   definitely identified, however there is no pericecal inflammation to   suggest acute appendicitis.    BONES/SOFT TISSUES: Degenerative changes of the spine.      IMPRESSION:  No evidence of acute/inflammatory process in the abdomen or pelvis.    1.0 cm left para-aortic lymph node. Additional subcentimeter pelvic lymph   nodes. These are of unclear etiology. Recommend follow-upto exclude   malignancy    --- End of Report ---            KEDAR SUNSHINE MD; Attending Radiologist  This document has been electronically signed. Silas  3 2024  6:57PM (06-03-24 @ 17:42)      CARDIOLOGY TESTING  12 Lead ECG:   Ventricular Rate 99 BPM    Atrial Rate 99 BPM    P-R Interval 120 ms    QRS Duration 76 ms    Q-T Interval 334 ms    QTC Calculation(Bazett) 428 ms    P Axis 84 degrees    R Axis 53 degrees    T Axis 3 degrees    Diagnosis Line Sinus rhythm with frequent Premature ventricular complexes  Nonspecific ST and T wave abnormality  Abnormal ECG    Confirmed by Sy Muñoz ([id number]) on 6/4/2024 11:00:35 AM (06-03-24 @ 16:16)      MEDICATIONS  albuterol/ipratropium for Nebulization 3 Nebulizer every 6 hours  amLODIPine   Tablet 10 Oral daily  aspirin enteric coated 81 Oral daily  atorvastatin 10 Oral at bedtime  budesonide 160 MICROgram(s)/formoterol 4.5 MICROgram(s) Inhaler 2 Inhalation two times a day  dextrose 10% Bolus 125 IV Bolus once  dextrose 5%. 1000 IV Continuous <Continuous>  dextrose 5%. 1000 IV Continuous <Continuous>  dextrose 50% Injectable 12.5 IV Push once  dextrose 50% Injectable 25 IV Push once  enoxaparin Injectable 40 SubCutaneous every 24 hours  glucagon  Injectable 1 IntraMuscular once  insulin lispro (ADMELOG) corrective regimen sliding scale  SubCutaneous three times a day before meals  methylPREDNISolone sodium succinate Injectable 40 IV Push every 12 hours  pantoprazole    Tablet 40 Oral before breakfast        ANTIBIOTICS:      ALLERGIES:  No Known Allergies

## 2024-06-05 NOTE — CONSULT NOTE ADULT - CONVERSATION DETAILS
Introduced palliative care to patient. She states that her present quality of life is good. She has thought about how she would want to be treated if her health seriously declines and would want to make sure that she does not suffer.     She is agreeable to discussing advance care planning and would like to be DNR/DNI. Discussed with her son, Dexter, on the phone who is in agreement with this decision. He also states that patient has previously completed HCP listing her daughters as HCP. He will try to bring in a copy.

## 2024-06-05 NOTE — SWALLOW FEES ASSESSMENT ADULT - SLP PERTINENT HISTORY OF CURRENT PROBLEM
78-year-old female past medical history of COPD not on home O2, current smoker (1 pack a day), hypertension, hyperlipidemia, CAD status post stents. Presents to the ED for SOB, complaining of unsteadiness of gait, generalized weakness x 1 week.  CT abdomen/chest showed bibasilar subsegmental atelectasis.

## 2024-06-05 NOTE — SWALLOW FEES ASSESSMENT ADULT - PHARYNGEAL PHASE COMMENTS
Pharyngeal swallow is WFL for moderately thick, puree and easy to chew solids Severe pharyngeal dysphagia for thin and mildly thick liquids, Pt was a bit tense and uncomfortable by scope which likely impacted toleration/natural swallow reflex timing

## 2024-06-05 NOTE — PHYSICAL THERAPY INITIAL EVALUATION ADULT - GENERAL OBSERVATIONS, REHAB EVAL
4:00-4:27pm Pt encountered in semi-yee position in bed, A & O x 3 in NAD, +O2 2L via NC, no c/o pain and agreeable to PT. Pt requires Min A in bed mobility, Min A in transfers activity and ambulated 15 ft Min A using RW with decreased braulio. Pt demonstrates limited mobility secondary to weakness, +balance instability during standing/gait training and poor endurance. SPO2 90-93% with cont 2L during activity(desats to 79% RA). Pt left seated OOB in chair after PT session, RN aware. Pt will benefit from skilled PT 3-5x/wk for thera ex, functional mobility training, balance act and gait training to improve mobility status and return to previous level of function.

## 2024-06-06 ENCOUNTER — TRANSCRIPTION ENCOUNTER (OUTPATIENT)
Age: 79
End: 2024-06-06

## 2024-06-06 LAB
ANION GAP SERPL CALC-SCNC: 10 MMOL/L — SIGNIFICANT CHANGE UP (ref 7–14)
BASOPHILS # BLD AUTO: 0.04 K/UL — SIGNIFICANT CHANGE UP (ref 0–0.2)
BASOPHILS NFR BLD AUTO: 0.3 % — SIGNIFICANT CHANGE UP (ref 0–1)
BUN SERPL-MCNC: 28 MG/DL — HIGH (ref 10–20)
CALCIUM SERPL-MCNC: 8.9 MG/DL — SIGNIFICANT CHANGE UP (ref 8.4–10.4)
CHLORIDE SERPL-SCNC: 105 MMOL/L — SIGNIFICANT CHANGE UP (ref 98–110)
CO2 SERPL-SCNC: 28 MMOL/L — SIGNIFICANT CHANGE UP (ref 17–32)
CREAT SERPL-MCNC: 0.7 MG/DL — SIGNIFICANT CHANGE UP (ref 0.7–1.5)
EGFR: 87 ML/MIN/1.73M2 — SIGNIFICANT CHANGE UP
EOSINOPHIL # BLD AUTO: 0 K/UL — SIGNIFICANT CHANGE UP (ref 0–0.7)
EOSINOPHIL NFR BLD AUTO: 0 % — SIGNIFICANT CHANGE UP (ref 0–8)
GLUCOSE BLDC GLUCOMTR-MCNC: 126 MG/DL — HIGH (ref 70–99)
GLUCOSE BLDC GLUCOMTR-MCNC: 168 MG/DL — HIGH (ref 70–99)
GLUCOSE BLDC GLUCOMTR-MCNC: 187 MG/DL — HIGH (ref 70–99)
GLUCOSE BLDC GLUCOMTR-MCNC: 258 MG/DL — HIGH (ref 70–99)
GLUCOSE SERPL-MCNC: 196 MG/DL — HIGH (ref 70–99)
HCT VFR BLD CALC: 42.1 % — SIGNIFICANT CHANGE UP (ref 37–47)
HGB BLD-MCNC: 13.4 G/DL — SIGNIFICANT CHANGE UP (ref 12–16)
IMM GRANULOCYTES NFR BLD AUTO: 1.3 % — HIGH (ref 0.1–0.3)
LYMPHOCYTES # BLD AUTO: 0.7 K/UL — LOW (ref 1.2–3.4)
LYMPHOCYTES # BLD AUTO: 4.4 % — LOW (ref 20.5–51.1)
MCHC RBC-ENTMCNC: 30 PG — SIGNIFICANT CHANGE UP (ref 27–31)
MCHC RBC-ENTMCNC: 31.8 G/DL — LOW (ref 32–37)
MCV RBC AUTO: 94.2 FL — SIGNIFICANT CHANGE UP (ref 81–99)
MONOCYTES # BLD AUTO: 0.62 K/UL — HIGH (ref 0.1–0.6)
MONOCYTES NFR BLD AUTO: 3.9 % — SIGNIFICANT CHANGE UP (ref 1.7–9.3)
NEUTROPHILS # BLD AUTO: 14.32 K/UL — HIGH (ref 1.4–6.5)
NEUTROPHILS NFR BLD AUTO: 90.1 % — HIGH (ref 42.2–75.2)
NRBC # BLD: 0 /100 WBCS — SIGNIFICANT CHANGE UP (ref 0–0)
PLATELET # BLD AUTO: 268 K/UL — SIGNIFICANT CHANGE UP (ref 130–400)
PMV BLD: 10.7 FL — HIGH (ref 7.4–10.4)
POTASSIUM SERPL-MCNC: 4.8 MMOL/L — SIGNIFICANT CHANGE UP (ref 3.5–5)
POTASSIUM SERPL-SCNC: 4.8 MMOL/L — SIGNIFICANT CHANGE UP (ref 3.5–5)
RBC # BLD: 4.47 M/UL — SIGNIFICANT CHANGE UP (ref 4.2–5.4)
RBC # FLD: 13 % — SIGNIFICANT CHANGE UP (ref 11.5–14.5)
SODIUM SERPL-SCNC: 143 MMOL/L — SIGNIFICANT CHANGE UP (ref 135–146)
WBC # BLD: 15.88 K/UL — HIGH (ref 4.8–10.8)
WBC # FLD AUTO: 15.88 K/UL — HIGH (ref 4.8–10.8)

## 2024-06-06 PROCEDURE — 99232 SBSQ HOSP IP/OBS MODERATE 35: CPT

## 2024-06-06 RX ORDER — GABAPENTIN 400 MG/1
2 CAPSULE ORAL
Refills: 0 | DISCHARGE

## 2024-06-06 RX ORDER — AMLODIPINE BESYLATE 2.5 MG/1
1 TABLET ORAL
Refills: 0 | DISCHARGE

## 2024-06-06 RX ORDER — ALPRAZOLAM 0.25 MG
1 TABLET ORAL
Refills: 0 | DISCHARGE

## 2024-06-06 RX ORDER — AMLODIPINE BESYLATE 2.5 MG/1
5 TABLET ORAL EVERY 24 HOURS
Refills: 0 | Status: DISCONTINUED | OUTPATIENT
Start: 2024-06-07 | End: 2024-06-07

## 2024-06-06 RX ORDER — UMECLIDINIUM BROMIDE AND VILANTEROL TRIFENATATE 62.5; 25 UG/1; UG/1
1 POWDER RESPIRATORY (INHALATION)
Qty: 0 | Refills: 0 | DISCHARGE

## 2024-06-06 RX ORDER — ASPIRIN/CALCIUM CARB/MAGNESIUM 324 MG
1 TABLET ORAL
Qty: 0 | Refills: 0 | DISCHARGE

## 2024-06-06 RX ORDER — ATORVASTATIN CALCIUM 80 MG/1
1 TABLET, FILM COATED ORAL
Qty: 0 | Refills: 0 | DISCHARGE

## 2024-06-06 RX ORDER — ALPRAZOLAM 0.25 MG
0.5 TABLET ORAL AT BEDTIME
Refills: 0 | Status: DISCONTINUED | OUTPATIENT
Start: 2024-06-06 | End: 2024-06-07

## 2024-06-06 RX ORDER — GABAPENTIN 400 MG/1
200 CAPSULE ORAL EVERY 8 HOURS
Refills: 0 | Status: DISCONTINUED | OUTPATIENT
Start: 2024-06-06 | End: 2024-06-07

## 2024-06-06 RX ADMIN — GABAPENTIN 200 MILLIGRAM(S): 400 CAPSULE ORAL at 21:21

## 2024-06-06 RX ADMIN — Medication 40 MILLIGRAM(S): at 05:42

## 2024-06-06 RX ADMIN — Medication 3 MILLILITER(S): at 20:10

## 2024-06-06 RX ADMIN — AMLODIPINE BESYLATE 10 MILLIGRAM(S): 2.5 TABLET ORAL at 05:41

## 2024-06-06 RX ADMIN — ENOXAPARIN SODIUM 40 MILLIGRAM(S): 100 INJECTION SUBCUTANEOUS at 05:42

## 2024-06-06 RX ADMIN — Medication 2: at 08:02

## 2024-06-06 RX ADMIN — Medication 6: at 11:36

## 2024-06-06 RX ADMIN — ATORVASTATIN CALCIUM 10 MILLIGRAM(S): 80 TABLET, FILM COATED ORAL at 21:21

## 2024-06-06 RX ADMIN — Medication 81 MILLIGRAM(S): at 11:36

## 2024-06-06 RX ADMIN — Medication 2: at 17:25

## 2024-06-06 RX ADMIN — PANTOPRAZOLE SODIUM 40 MILLIGRAM(S): 20 TABLET, DELAYED RELEASE ORAL at 05:42

## 2024-06-06 NOTE — SWALLOW BEDSIDE ASSESSMENT ADULT - ORAL PHASE
Within functional limits
Decreased anterior-posterior movement of the bolus/Delayed oral transit time
Decreased anterior-posterior movement of the bolus/Delayed oral transit time

## 2024-06-06 NOTE — DIETITIAN INITIAL EVALUATION ADULT - ADD RECOMMEND
1. Continue with diet order per SLP recs  2. Encourage PO intake, assist with meals times prn     pt at mod risk f/u 5-7 days or prn  1. Continue with diet order per SLP recs  2. Encourage PO intake, assist with meals times prn   3. Monitor PO intake, if below <50% consider oral nutrition supplement     pt at mod risk f/u 5-7 days or prn

## 2024-06-06 NOTE — SWALLOW BEDSIDE ASSESSMENT ADULT - SWALLOW EVAL: DIAGNOSIS
+toleration for easy to chew w/ mod thick liquids
+ overt s/s of aspiration w/thin liquids. Mild oral dysphagia with regular solids. Toleration observed for easy to chew solids with mildly thick liquids without overt s/s of penetration/ aspiration.
Pt. continues to present with overt s/s of aspiration w/thin liquids. Toleration observed for mildly thick liquids without overt s/s of penetration/ aspiration. FEES instrumental swallow study is indicated to further assess swallow physiology.

## 2024-06-06 NOTE — SWALLOW BEDSIDE ASSESSMENT ADULT - NS ASR SWALLOW FINDINGS DISCUS
RN Joie, and MD made aware./Physician/Nursing
RN and MD made aware./Physician/Nursing
son/Physician/Nursing/Patient/Family

## 2024-06-06 NOTE — SWALLOW BEDSIDE ASSESSMENT ADULT - SLP GENERAL OBSERVATIONS
Pt received in bed awake and alert +tolerating current po diet
Pt. received at bedside, awake, alert, 3L o2 NC.
Pt. received at bedside, awake, alert, 3L o2 NC, no c/o pain.

## 2024-06-06 NOTE — DIETITIAN INITIAL EVALUATION ADULT - NS FNS DIET ORDER
Diet, Easy to Chew:   DASH/TLC {Sodium & Cholesterol Restricted} (DASH)  Moderately Thick Liquids (MODTHICKLIQS) (06-06-24 @ 07:25)

## 2024-06-06 NOTE — DIETITIAN INITIAL EVALUATION ADULT - ENERGY INTAKE
Pt reports intake of 50% of her meals on admission, states her appetite is good but she is unable to eat large volumes at one time.  Fair (50-75%)

## 2024-06-06 NOTE — DIETITIAN INITIAL EVALUATION ADULT - PERTINENT MEDS FT
MEDICATIONS  (STANDING):  albuterol/ipratropium for Nebulization 3 milliLiter(s) Nebulizer every 6 hours  amLODIPine   Tablet 10 milliGRAM(s) Oral daily  aspirin enteric coated 81 milliGRAM(s) Oral daily  atorvastatin 10 milliGRAM(s) Oral at bedtime  budesonide 160 MICROgram(s)/formoterol 4.5 MICROgram(s) Inhaler 2 Puff(s) Inhalation two times a day  dextrose 10% Bolus 125 milliLiter(s) IV Bolus once  dextrose 5%. 1000 milliLiter(s) (100 mL/Hr) IV Continuous <Continuous>  dextrose 5%. 1000 milliLiter(s) (50 mL/Hr) IV Continuous <Continuous>  dextrose 50% Injectable 12.5 Gram(s) IV Push once  dextrose 50% Injectable 25 Gram(s) IV Push once  enoxaparin Injectable 40 milliGRAM(s) SubCutaneous every 24 hours  glucagon  Injectable 1 milliGRAM(s) IntraMuscular once  insulin lispro (ADMELOG) corrective regimen sliding scale   SubCutaneous three times a day before meals  pantoprazole    Tablet 40 milliGRAM(s) Oral before breakfast    MEDICATIONS  (PRN):  albuterol    90 MICROgram(s) HFA Inhaler 2 Puff(s) Inhalation every 6 hours PRN Bronchospasm  dextrose Oral Gel 15 Gram(s) Oral once PRN Blood Glucose LESS THAN 70 milliGRAM(s)/deciliter

## 2024-06-06 NOTE — DIETITIAN INITIAL EVALUATION ADULT - PERTINENT LABORATORY DATA
06-06    143  |  105  |  28<H>  ----------------------------<  196<H>  4.8   |  28  |  0.7    Ca    8.9      06 Jun 2024 05:18  Phos  3.7     06-05  Mg     2.6     06-05    TPro  5.9<L>  /  Alb  3.5  /  TBili  0.3  /  DBili  x   /  AST  23  /  ALT  34  /  AlkPhos  140<H>  06-05  POCT Blood Glucose.: 258 mg/dL (06-06-24 @ 10:55)  A1C with Estimated Average Glucose Result: 6.9 % (06-05-24 @ 06:30)

## 2024-06-06 NOTE — PROGRESS NOTE ADULT - ASSESSMENT
78-year-old female past medical history of COPD not on home O2, current smoker, hypertension, hyperlipidemia, CAD status post stents presents to the ED for evaluation.     #Acute on chronic COPD exacerbation  #acute hypercapneic respiratory failure  - NCHCT wnl  - VBG improved after bipap overnight with CO2 46  - continue solumedrol for now as she is wheezing  - ppi and ISS  - continue duoneb and symbicort  - stop azithromycin  - bipap overnight  - PT consult: seen on 6/5  - Palliative consult: DNR/DNI.    #Dysphagia  - Frequent coughing while eating per son. Also had coughing bout on 6/4 during breakfast.  - S/s eval 6/4 and FEES 6/5. Rec easy to chew w/ mod thick fluids.    #CAD sp PCI in 2019  #HLD   cont Aspirin  - resume statin (elevated ALP is not a CI)  - Follows cardiologist at [facility].  - States her stents were placed > 5 years ago.    #HTN  cont amlodipine    #Elevated glucose  - Not diabetic (per patient), ordered ISS and fingersticks given that patient is on steroid for above issue.    dvt ppx: lovenox  gi ppx: protonix  activity: AAT  diet: DASH  #Med rec: called [facility] on [facility]/6 and obtained list, updated med rec and placed orders.    #Pending: home oxygen setup.

## 2024-06-06 NOTE — SWALLOW BEDSIDE ASSESSMENT ADULT - CONSISTENCIES ADMINISTERED
thin liquid/mildly thick/easy to chew/regular solid
4oz/moderately thick/easy to chew
thin liquid/mildly thick

## 2024-06-06 NOTE — SWALLOW BEDSIDE ASSESSMENT ADULT - SWALLOW EVAL: RECOMMENDED FEEDING/EATING TECHNIQUES
position upright (90 degrees)/small sips/bites
oral hygiene/position upright (90 degrees)/small sips/bites
position upright (90 degrees)/small sips/bites
Yes

## 2024-06-06 NOTE — DIETITIAN INITIAL EVALUATION ADULT - NSFNSNUTRCHEWSWALLOWFT_GEN_A_CORE
- S/s eval 6/4: easy to chew w/ mildly thick liquids.  - S/s recommends FEES, continue current diet for now.

## 2024-06-06 NOTE — DISCHARGE NOTE NURSING/CASE MANAGEMENT/SOCIAL WORK - PATIENT PORTAL LINK FT
You can access the FollowMyHealth Patient Portal offered by [facility] by registering at the following website: [email or web address]. By joining [facility]’s FollowMyHealth portal, you will also be able to view your health information using other applications (apps) compatible with our system.

## 2024-06-06 NOTE — PROGRESS NOTE ADULT - TIME BILLING
direct patient care and chart review  -coordinated current plan of care with medical staff on MDR
direct patient care and chart review  -coordinated current plan of care with medical staff on MDR

## 2024-06-06 NOTE — DIETITIAN INITIAL EVALUATION ADULT - ORAL INTAKE PTA/DIET HISTORY
Son at bedside to provide hx. Reports pt has a fair appetite at home consuming <=/50% of her meals. Denies any dietary restrictions, reports pt often dines out at restaurant with friends and brings home leftovers containing 50% of her meals. He reports this has been her baseline for the past few months due to continuos treatments and doctor's visits. Pt reports UBW 143lb. Denies any significant wt loss hx. Son states he bought patient OTC fruit/vegetable supplement pills, unsure of name and is unsure if patient takes them daily. SILVANO

## 2024-06-06 NOTE — PROGRESS NOTE ADULT - SUBJECTIVE AND OBJECTIVE BOX
24H events:    Patient is a 78y old Female who presents with a chief complaint of Chronic obstructive pulmonary disease with acute exacerbation     (06 Jun 2024 12:10)    Primary diagnosis of COPD exacerbation    Today is hospital day 3d. This morning patient was seen and examined at bedside, resting comfortably in bed.    No acute or major events overnight.    Code Status:  DNR/DNI    Family communication:  Contact date: 6/6/24  Name of person contacted: Dexter  Relationship to patient: Son  Communication details: Given medical updates. Discussed need for home O2. Dexter asked about a private machine and showed a website. he was explained that we cannot comment on it as it is not something that we know about. he was explained that we will help arrange home O2 as she is requiring oxygen. Case management involved in discussion. Dexter agrees to plan. he was eager to take her home today but understands that home O2 will take time to setup and she will almost certainly not be discharged today.  What matters most: home o2 setup and discharge home.    PAST MEDICAL & SURGICAL HISTORY  CVA (cerebral vascular accident)    Anxiety    HTN (hypertension)    COPD (chronic obstructive pulmonary disease)    S/P total abdominal hysterectomy      SOCIAL HISTORY:  Social History:      ALLERGIES:  No Known Allergies    MEDICATIONS:  STANDING MEDICATIONS  albuterol/ipratropium for Nebulization 3 milliLiter(s) Nebulizer every 6 hours  amLODIPine   Tablet 10 milliGRAM(s) Oral daily  aspirin enteric coated 81 milliGRAM(s) Oral daily  atorvastatin 10 milliGRAM(s) Oral at bedtime  budesonide 160 MICROgram(s)/formoterol 4.5 MICROgram(s) Inhaler 2 Puff(s) Inhalation two times a day  dextrose 10% Bolus 125 milliLiter(s) IV Bolus once  dextrose 5%. 1000 milliLiter(s) IV Continuous <Continuous>  dextrose 5%. 1000 milliLiter(s) IV Continuous <Continuous>  dextrose 50% Injectable 12.5 Gram(s) IV Push once  dextrose 50% Injectable 25 Gram(s) IV Push once  enoxaparin Injectable 40 milliGRAM(s) SubCutaneous every 24 hours  glucagon  Injectable 1 milliGRAM(s) IntraMuscular once  insulin lispro (ADMELOG) corrective regimen sliding scale   SubCutaneous three times a day before meals  pantoprazole    Tablet 40 milliGRAM(s) Oral before breakfast    PRN MEDICATIONS  albuterol    90 MICROgram(s) HFA Inhaler 2 Puff(s) Inhalation every 6 hours PRN  dextrose Oral Gel 15 Gram(s) Oral once PRN    VITALS:   T(F): 97.9  HR: 79  BP: 127/73  RR: 18  SpO2: 96%    PHYSICAL EXAM:  GENERAL:   ( X ) NAD, lying in bed comfortably     (  ) obtunded     (  ) lethargic     (  ) somnolent    HEAD:   ( X ) Atraumatic     (  ) hematoma     (  ) laceration (specify location:       )     NECK:  ( X ) Supple     (  ) neck stiffness     (  ) nuchal rigidity     (  )  no JVD     (  ) JVD present ( -- cm)    HEART:  Rate -->     ( X ) normal rate     (  ) bradycardic     (  ) tachycardic  Rhythm -->     ( X ) regular     (  ) regularly irregular     (  ) irregularly irregular  Murmurs -->     ( X ) normal s1s2     (  ) systolic murmur     (  ) diastolic murmur     (  ) continuous murmur      (  ) S3 present     (  ) S4 present    LUNGS:   ( X ) Unlabored respirations     (  ) tachypnea  ( X ) B/L air entry     (  ) decreased breath sounds in:  (location     )    (  ) no adventitious sound     (  ) crackles     ( X ) wheezing b/l diffuse     (  ) rhonchi      (specify location:       )  (  ) chest wall tenderness (specify location:       )    ABDOMEN:   ( X ) Soft     (  ) tense   |   ( X ) nondistended     (  ) distended   |   (  ) +BS     (  ) hypoactive bowel sounds     (  ) hyperactive bowel sounds  ( X ) nontender     (  ) RUQ tenderness     (  ) RLQ tenderness     (  ) LLQ tenderness     (  ) epigastric tenderness     (  ) diffuse tenderness  (  ) Splenomegaly      (  ) Hepatomegaly      (  ) Jaundice     (  ) ecchymosis     EXTREMITIES:  ( X ) Normal     (  ) Rash     (  ) ecchymosis     (  ) varicose veins      (  ) pitting edema     (  ) non-pitting edema   (  ) ulceration     (  ) gangrene:     (location:     )    NERVOUS SYSTEM:    ( X ) A&Ox3     (  ) confused     (  ) lethargic  CN II-XII:     ( X ) Intact     (  ) deficits found     (Specify:     )   Upper extremities:     ( X ) no sensorimotor deficits     (  ) weakness     (  ) loss of proprioception/vibration     (  ) loss of touch/temperature (specify:    )  Lower extremities:     ( X ) no sensorimotor deficits     (  ) weakness     (  ) loss of proprioception/vibration     (  ) loss of touch/temperature (specify:    )    LABS:                        13.4   15.88 )-----------( 268      ( 06 Jun 2024 05:18 )             42.1     06-06    143  |  105  |  28<H>  ----------------------------<  196<H>  4.8   |  28  |  0.7    Ca    8.9      06 Jun 2024 05:18  Phos  3.7     06-05  Mg     2.6     06-05    TPro  5.9<L>  /  Alb  3.5  /  TBili  0.3  /  DBili  x   /  AST  23  /  ALT  34  /  AlkPhos  140<H>  06-05    Culture - Blood (collected 03 Jun 2024 17:02)  Source: .Blood Blood-Peripheral  Preliminary Report (05 Jun 2024 23:11):    No growth at 48 Hours    Culture - Blood (collected 03 Jun 2024 17:02)  Source: .Blood Blood-Peripheral  Preliminary Report (05 Jun 2024 23:11):    No growth at 48 Hours    Urinalysis with Rflx Culture (collected 03 Jun 2024 16:04)    Culture - Urine (collected 03 Jun 2024 16:04)  Source: Clean Catch None  Final Report (04 Jun 2024 22:51):    <10,000 CFU/mL Normal Urogenital Jody

## 2024-06-06 NOTE — CHART NOTE - NSCHARTNOTEFT_GEN_A_CORE
Dx : Acute hypoxic respiratory failure (ICD10: J96.01)    - Room air pulse ox. at rest: 88%   - Room air pulse ox. while ambulatin%  - Pulse ox while ambulating on 3 liters O2 90-94%    Patient will require home O2 for discharge.  Patient is aware and agreeable to home O2.  Patient is in a chronic stable state of COPD.

## 2024-06-06 NOTE — DIETITIAN INITIAL EVALUATION ADULT - OTHER INFO
78-year-old female past medical history of COPD not on home O2, current smoker, hypertension, hyperlipidemia, CAD status post stents presents to the ED for evaluation.     #Acute on chronic COPD exacerbation  #acute hypercapneic respiratory failure  #Dysphagia - Frequent coughing while eating per son. Also had coughing bout on 6/4 during breakfast.  #Elevated glucose - Not diabetic (per patient), ordered ISS and fingersticks given that patient is on steroid for above issue.

## 2024-06-06 NOTE — DISCHARGE NOTE NURSING/CASE MANAGEMENT/SOCIAL WORK - NSDCPEWEB_GEN_ALL_CORE
[facility] for Tobacco Control website --- [email or web address] website --- [email or web address]

## 2024-06-06 NOTE — SWALLOW BEDSIDE ASSESSMENT ADULT - SWALLOW EVAL: RECOMMENDED DIET
Continue with an Easy to chew diet w/ mildly thick liquids.
easy to chew w/ moderately thick liquids
Easy to chew diet w/ mildly thick liquids.

## 2024-06-06 NOTE — DISCHARGE NOTE NURSING/CASE MANAGEMENT/SOCIAL WORK - NSDCPEEMAIL_GEN_ALL_CORE
[facility] for Tobacco Control email [email or web address]

## 2024-06-06 NOTE — DIETITIAN INITIAL EVALUATION ADULT - COLLABORATION WITH OTHER PROVIDERS
Intervention: meals and snacks  Monitoring/Evaluation: diet order, energy intake, weights, labs, GI s/s, BG, skin status, NFPE

## 2024-06-06 NOTE — PROGRESS NOTE ADULT - ATTENDING COMMENTS
patient seen and examined independently of medical resident and agree with the note unless otherwise stated     Vital Signs Last 24 Hrs  T(C): 37.2 (06 Jun 2024 12:58), Max: 37.2 (06 Jun 2024 12:58)  T(F): 98.9 (06 Jun 2024 12:58), Max: 98.9 (06 Jun 2024 12:58)  HR: 96 (06 Jun 2024 12:58) (79 - 97)  BP: 131/76 (06 Jun 2024 12:58) (122/64 - 131/76)  BP(mean): --  RR: 18 (06 Jun 2024 12:58) (18 - 18)  SpO2: 96% (06 Jun 2024 05:18) (79% - 96%)    Parameters below as of 05 Jun 2024 16:16  Patient On (Oxygen Delivery Method): nasal cannula  O2 Flow (L/min): 3                          13.4   15.88 )-----------( 268      ( 06 Jun 2024 05:18 )             42.1     06-06    143  |  105  |  28<H>  ----------------------------<  196<H>  4.8   |  28  |  0.7    Ca    8.9      06 Jun 2024 05:18  Phos  3.7     06-05  Mg     2.6     06-05    TPro  5.9<L>  /  Alb  3.5  /  TBili  0.3  /  DBili  x   /  AST  23  /  ALT  34  /  AlkPhos  140<H>  06-05    # Acute COPD exacerbation / Acute respiratory failure with Hypoxia   # Age related debility / unsteady gait   # Dysphagia     -on IV steroids - taper to oral and monitor resp status , PPI, nebs, Symbicort - requiring supplemental O2   -home O2 paperwork initiated   -s/p FEES 6/5/24- on modified honey thickened diet - aspiration precautions   -rehab/pt as tolerated   -Palliative care team input appreciated - DNR/DNI with guarded prognosis     DISPO: not discharge ready today - anticipate next 24 hrs   -spoke to patient about her plan of care   -updated daughter at bedside 3pm   -medical intern updated son this am     Attending Physician Dr. Deana Montes # [id number]

## 2024-06-06 NOTE — SWALLOW BEDSIDE ASSESSMENT ADULT - NS SPL SWALLOW CLINIC TRIAL FT
Mild oral dysphagia with regular solids. Toleration observed for easy to chew solids with mildly thick liquids without overt s/s of penetration/ aspiration.
+toleration
Toleration observed for mildly thick liquids without overt s/s of penetration/ aspiration.

## 2024-06-06 NOTE — DISCHARGE NOTE NURSING/CASE MANAGEMENT/SOCIAL WORK - NSDCPEFALRISK_GEN_ALL_CORE
For information on Fall & Injury Prevention, visit: [email or web address] OR  [email or web address] OR  https://www.cdc.gov/steadi/patient.html

## 2024-06-06 NOTE — SWALLOW BEDSIDE ASSESSMENT ADULT - SWALLOW EVAL: CURRENT DIET
Regular w/thin liquids.
easy to chew w/ moderately thick liquids
Easy to chew diet w/mildly thick liquids.

## 2024-06-07 ENCOUNTER — TRANSCRIPTION ENCOUNTER (OUTPATIENT)
Age: 79
End: 2024-06-07

## 2024-06-07 VITALS
HEART RATE: 89 BPM | TEMPERATURE: 98 F | RESPIRATION RATE: 18 BRPM | SYSTOLIC BLOOD PRESSURE: 125 MMHG | DIASTOLIC BLOOD PRESSURE: 74 MMHG

## 2024-06-07 LAB
GLUCOSE BLDC GLUCOMTR-MCNC: 139 MG/DL — HIGH (ref 70–99)
GLUCOSE BLDC GLUCOMTR-MCNC: 218 MG/DL — HIGH (ref 70–99)
GLUCOSE BLDC GLUCOMTR-MCNC: 70 MG/DL — SIGNIFICANT CHANGE UP (ref 70–99)

## 2024-06-07 PROCEDURE — 74230 X-RAY XM SWLNG FUNCJ C+: CPT | Mod: 26

## 2024-06-07 PROCEDURE — 99239 HOSP IP/OBS DSCHRG MGMT >30: CPT

## 2024-06-07 RX ADMIN — PANTOPRAZOLE SODIUM 40 MILLIGRAM(S): 20 TABLET, DELAYED RELEASE ORAL at 06:35

## 2024-06-07 RX ADMIN — Medication 81 MILLIGRAM(S): at 12:04

## 2024-06-07 RX ADMIN — AMLODIPINE BESYLATE 5 MILLIGRAM(S): 2.5 TABLET ORAL at 00:37

## 2024-06-07 RX ADMIN — ENOXAPARIN SODIUM 40 MILLIGRAM(S): 100 INJECTION SUBCUTANEOUS at 06:35

## 2024-06-07 RX ADMIN — GABAPENTIN 200 MILLIGRAM(S): 400 CAPSULE ORAL at 05:31

## 2024-06-07 RX ADMIN — GABAPENTIN 200 MILLIGRAM(S): 400 CAPSULE ORAL at 13:38

## 2024-06-07 RX ADMIN — BUDESONIDE AND FORMOTEROL FUMARATE DIHYDRATE 2 PUFF(S): 160; 4.5 AEROSOL RESPIRATORY (INHALATION) at 09:12

## 2024-06-07 RX ADMIN — Medication 4: at 12:03

## 2024-06-07 RX ADMIN — Medication 3 MILLILITER(S): at 15:17

## 2024-06-07 NOTE — SWALLOW VFSS/MBS ASSESSMENT ADULT - SUCCESSFUL STRATEGIES TRIALED DURING PROCEDURE
effective in protecting the airway w/ thin/chin tuck consecutive swallow helped clear oral and BOT residue

## 2024-06-07 NOTE — SWALLOW VFSS/MBS ASSESSMENT ADULT - ORAL PHASE
Uncontrolled bolus / spillover in hypopharynx weak BOT propulsion/Incomplete tongue to palate contact

## 2024-06-07 NOTE — DISCHARGE NOTE PROVIDER - NSDCMRMEDTOKEN_GEN_ALL_CORE_FT
Patient Education      work up was reassuring in the ED tonight. You do have mild colitis on CT scan. I have spoken with the GI doctor on call and they would like you to follow up with Dr. Blayne Flores this week. You can stop the tramadol and start the [address] for pain. Take with benadryl. Learning About Colitis  What is colitis? Colitis is swelling (inflammation) of the colon. The colon makes up most of the large intestine. Many conditions can cause colitis. What are some types of colitis? · Infectious colitis is a type that appears suddenly. It's caused by a bacteria or virus, such as salmonella, shigella, or campylobacter. · Ischemic colitis is caused by problems with blood flow to the colon. This can happen after surgery. It can also be caused by other health problems. · Microscopic colitis doesn't always have a clear cause. It can only be found with special tests. · Crohn's disease and ulcerative colitis are lifelong diseases. They can cause swelling, inflammation, and deep sores in the lining of the digestive tract. What are the symptoms? Symptoms may include fever, diarrhea that may be bloody, or belly pain. You may also have an urgent need to move your bowels or pain when you move your bowels. Or you may have bleeding from the rectum or weight loss. Your symptoms may depend on the type of colitis you have. For example, microscopic colitis may cause watery diarrhea. Sometimes symptoms go away on their own. If they don't go away, or if you have bleeding or severe pain, call your doctor right away. How is it diagnosed? You may need blood tests or a stool test. You also may need imaging tests like a CT scan. You may have a colonoscopy so that a doctor can look inside your colon. In some cases, the doctor may want to test a sample of tissue from the intestine. This test is called a biopsy. How is it treated? Treatment for colitis depends on the condition that is causing it.   · Antibiotics may be used to treat an infection. · Diet changes may help with symptoms. · Medicines can also help to relieve inflammation and control symptoms. · In some cases, surgery to remove parts of the intestine may be needed. Follow-up care is a key part of your treatment and safety. Be sure to make and go to all appointments, and call your doctor if you are having problems. It's also a good idea to know your test results and keep a list of the medicines you take. Where can you learn more? Go to http://[employer]ad[employer]-alia.info/. Enter C130 in the search box to learn more about \"Learning About Colitis. \"  Current as of: March 27, 2018  Content Version: 11.9  © 7431-7044 [employer]. Care instructions adapted under license by [employer] (which disclaims liability or warranty for this information). If you have questions about a medical condition or this instruction, always ask your healthcare professional. [address] any warranty or liability for your use of this information. ALPRAZolam 0.5 mg oral tablet: 1 tab(s) orally once a day (at bedtime) as needed for  anxiety  amLODIPine 5 mg oral tablet: 1 tab(s) orally once a day  Anoro Ellipta 62.5 mcg-25 mcg/inh inhalation powder: 1 puff(s) inhaled once a day  Aspir 81 oral delayed release tablet: 1 tab(s) orally once a day  atorvastatin 10 mg oral tablet: 1 tab(s) orally once a day  gabapentin 100 mg oral capsule: 2 cap(s) orally 3 times a day   ALPRAZolam 0.5 mg oral tablet: 1 tab(s) orally once a day (at bedtime) as needed for  anxiety  amLODIPine 5 mg oral tablet: 1 tab(s) orally once a day  Anoro Ellipta 62.5 mcg-25 mcg/inh inhalation powder: 1 puff(s) inhaled once a day  Aspir 81 oral delayed release tablet: 1 tab(s) orally once a day  atorvastatin 10 mg oral tablet: 1 tab(s) orally once a day  gabapentin 100 mg oral capsule: 2 cap(s) orally 3 times a day  pantoprazole 40 mg oral delayed release tablet: 1 tab(s) orally once a day before breakfast  predniSONE 20 mg oral tablet: 2 tab(s) orally once a day 40mg once a day for 4 days starting June 8

## 2024-06-07 NOTE — DISCHARGE NOTE PROVIDER - INSTRUCTIONS
Tuck-in your chin when swallowing liquids. Take small sips and bites to reduce chances of choking/aspiration.

## 2024-06-07 NOTE — SWALLOW VFSS/MBS ASSESSMENT ADULT - DIAGNOSTIC IMPRESSIONS
Mild oral dysphagia for solids, Moderate pharyngeal dysphagia for thin. A functional pharyngeal swallow was observed for mildly thick, puree and easy to chew solids. A Zenker's diverticulum was noted just below the cricopharyngeus which did not interrupt bolus flow nor cause backflow during this study.

## 2024-06-07 NOTE — DISCHARGE NOTE PROVIDER - CARE PROVIDER_API CALL
Daphne Mckay  Internal Medicine  [address] [place] [id number]  Phone: [phone]  Fax: [phone]  Follow Up Time: 1 week

## 2024-06-07 NOTE — SWALLOW VFSS/MBS ASSESSMENT ADULT - ESOPHAGEAL STAGE
A Zenker's diverticulum was noted just below the cricopharyngeus which did not interrupt bolus flow nor cause backflow during this study.

## 2024-06-07 NOTE — DISCHARGE NOTE PROVIDER - HOSPITAL COURSE
78-year-old female past medical history of COPD not on home O2, current smoker, hypertension, hyperlipidemia, CAD status post stents presents to the ED for evaluation of various complaints including unsteady gait and SOB, found to be in COPD exacerbation.    #Acute on chronic COPD exacerbation  #acute hypercapneic respiratory failure  - N[facility] wnl  - VBG improved after bipap overnight with CO2 46  - S/p solumedrol course --> wheeze resolved --> steroids discontinued.  - ppi while on steroids and ISS  - duoneb and symbicort while inpatient  - stop azithromycin  - bipap overnight  - PT consult: seen on 6/5, recommended [facility] but patient and son both do not want patient to go to [facility] despite explaining the necessity and high likelihood of returning to hospital due to deconditioning. Patient to be discharged home.  - Palliative consult: DNR/DNI.    #Dysphagia  - Frequent coughing while eating per son. Also had coughing bout on 6/4 during breakfast.  - S/s eval 6/4 and FEES 6/5. Rec easy to chew w/ mod thick fluids.  - S/s to do modified barium swallow study on 6/6 for safe discharge diet.    #CAD sp PCI in 2019  #HLD   cont Aspirin  - resume statin (elevated ALP is not a CI)  - Follows cardiologist at [facility].  - States her stents were placed > 5 years ago.    #HTN  cont amlodipine    #Elevated glucose  - Not diabetic (per patient), ordered ISS and fingersticks given that patient is on steroid for above issue.    dvt ppx: lovenox  gi ppx: protonix  activity: AAT  diet: DASH  #Med rec: called [facility] and obtained list, updated med rec and placed orders.    Patient seen at bedside on day of discharge. Stable and agreeable to discharge. States she feels significantly better than when she came to the hospital.   78-year-old female past medical history of COPD not on home O2, current smoker, hypertension, hyperlipidemia, CAD status post stents presents to the ED for evaluation of various complaints including unsteady gait and SOB, found to be in COPD exacerbation.    #Acute on chronic COPD exacerbation  #acute hypercapneic respiratory failure  - [facility] wnl  - VBG improved after bipap overnight with CO2 46  - S/p solumedrol course --> wheeze resolved --> steroids discontinued.  - ppi while on steroids and ISS  - duoneb and symbicort while inpatient  - stop azithromycin  - bipap overnight  - PT consult: seen on 6/5, recommended [facility] but patient and son both do not want patient to go to [facility] despite explaining the necessity and high likelihood of returning to hospital due to deconditioning. Patient to be discharged home.  - Palliative consult: DNR/DNI.    #Dysphagia  - Frequent coughing while eating per son. Also had coughing bout on 6/4 during breakfast.  - S/s eval 6/4 and FEES 6/5. Rec easy to chew w/ mod thick fluids.  - S/s did modified barium swallow study on 6/6 for safe discharge diet.  - A Zenker's diverticulum was noted just below the cricopharyngeus which did not interrupt bolus flow nor cause backflow during this study.  - Rec 6/7: easy to chew diet w/ thin liquids, chin tuck w/ liquids only, consecutive swallows.    #CAD sp PCI in 2019  #HLD   cont Aspirin  - resume statin (elevated ALP is not a CI)  - Follows cardiologist at [facility].  - States her stents were placed > 5 years ago.    #HTN  cont amlodipine    #Elevated glucose  - Not diabetic (per patient), ordered ISS and fingersticks given that patient is on steroid for above issue.    dvt ppx: lovenox  gi ppx: protonix  activity: AAT  diet: DASH  #Med rec: [facility] and obtained list, updated med rec and placed orders.    Patient seen at bedside on day of discharge. Stable and agreeable to discharge. States she feels significantly better than when she came to the hospital.

## 2024-06-07 NOTE — SWALLOW VFSS/MBS ASSESSMENT ADULT - PHARYNGEAL PHASE COMMENTS
Moderate pharyngeal dysphagia for thin liquids Functional pharyngeal swallow for mild, puree and easy to chew solids

## 2024-06-07 NOTE — DISCHARGE NOTE PROVIDER - ATTENDING DISCHARGE PHYSICAL EXAMINATION:
Vital Signs Last 24 Hrs  T(C): 36.8 (07 Jun 2024 04:24), Max: 37.2 (06 Jun 2024 12:58)  T(F): 98.3 (07 Jun 2024 04:24), Max: 98.9 (06 Jun 2024 12:58)  HR: 85 (07 Jun 2024 08:04) (85 - 96)  BP: 134/60 (07 Jun 2024 04:24) (131/76 - 152/65)  BP(mean): 87 (07 Jun 2024 04:24) (87 - 87)  RR: 19 (07 Jun 2024 08:04) (18 - 19)  SpO2: 91% (07 Jun 2024 08:04) (82% - 96%)    Parameters below as of 07 Jun 2024 08:04  Patient On (Oxygen Delivery Method): nasal cannula  O2 Flow (L/min): 3    PHYSICAL EXAM:  GENERAL: Not in distress - on O2 - speaking slowly but appropriately and finishes her sentence   NERVOUS SYSTEM:  Alert & Oriented X 3 with fair concentration   CHEST/LUNG: mild wheeze at the bases - better air entry compared to arrival to hospital   HEART: Regular rate and rhythm  ABDOMEN: Soft abdomen     -patient seen this am   -stable for home d/c   -short course of oral steroids   -home O2 arranged   -outpatient pulmonary follow up   -outpatient swallow follow up - currently on modified diet   -c/w Home O2 as arranged   -medical staff updated the family

## 2024-06-07 NOTE — DISCHARGE NOTE PROVIDER - NSDCCPCAREPLAN_GEN_ALL_CORE_FT
PRINCIPAL DISCHARGE DIAGNOSIS  Diagnosis: COPD exacerbation  Assessment and Plan of Treatment: Chronic obstructive pulmonary disease (COPD) is a lung condition in which airflow from the lungs is limited. Causes include smoking, secondhand smoke exposure, genetics, or recurrent infections. Take all medicines (inhaled or pills) as directed by your health care provider. Avoid exposure to irritants such as smoke, chemicals, and fumes that aggravate your breathing.  If you are a smoker, the most important thing that you can do is stop smoking. Continuing to smoke will cause further lung damage and breathing trouble. Ask your health care provider for help with quitting smoking.  You received treatment with inhalers, nebulizers and steroid medications. You are being discharged with oxygen because your oxygen saturations were low at rest and on ambulation.  SEEK IMMEDIATE MEDICAL CARE IF YOU HAVE ANY OF THE FOLLOWING SYMPTOMS: shortness of breath at rest or when talking, bluish discoloration of lips, skin, fever, worsening cough, unexplained chest pain, or lightheadedness/dizziness.

## 2024-06-07 NOTE — SWALLOW VFSS/MBS ASSESSMENT ADULT - RECOMMENDED FEEDING/EATING TECHNIQUES
chin tuck w/ liquids only, consecutive swallows to help clear oral and BOT residue/oral hygiene/position upright (90 degrees)/tuck chin

## 2024-06-07 NOTE — DISCHARGE NOTE PROVIDER - NSDCFUSCHEDAPPT_GEN_ALL_CORE_FT
[address]  Scheduled Appointment: 06/26/2024    Israel Garcia  [address]  Scheduled Appointment: 06/26/2024

## 2024-06-08 LAB
CULTURE RESULTS: SIGNIFICANT CHANGE UP
CULTURE RESULTS: SIGNIFICANT CHANGE UP
DEPRECATED S PNEUM 1 IGG SER-MCNC: <0.1 MCG/ML — SIGNIFICANT CHANGE UP
DEPRECATED S PNEUM12 IGG SER-MCNC: <0.1 MCG/ML — SIGNIFICANT CHANGE UP
DEPRECATED S PNEUM14 IGG SER-MCNC: <0.1 MCG/ML — SIGNIFICANT CHANGE UP
DEPRECATED S PNEUM17 IGG SER-MCNC: 0.2 MCG/ML — SIGNIFICANT CHANGE UP
DEPRECATED S PNEUM19 IGG SER-MCNC: 0.3 MCG/ML — SIGNIFICANT CHANGE UP
DEPRECATED S PNEUM19 IGG SER-MCNC: 0.8 MCG/ML — SIGNIFICANT CHANGE UP
DEPRECATED S PNEUM2 IGG SER-MCNC: 0.2 MCG/ML — SIGNIFICANT CHANGE UP
DEPRECATED S PNEUM20 IGG SER-MCNC: 0.5 MCG/ML — SIGNIFICANT CHANGE UP
DEPRECATED S PNEUM22 IGG SER-MCNC: 0.2 MCG/ML — SIGNIFICANT CHANGE UP
DEPRECATED S PNEUM23 IGG SER-MCNC: 0.2 MCG/ML — SIGNIFICANT CHANGE UP
DEPRECATED S PNEUM3 IGG SER-MCNC: 0.1 MCG/ML — SIGNIFICANT CHANGE UP
DEPRECATED S PNEUM4 IGG SER-MCNC: 0.1 MCG/ML — SIGNIFICANT CHANGE UP
DEPRECATED S PNEUM5 IGG SER-MCNC: <0.1 MCG/ML — SIGNIFICANT CHANGE UP
DEPRECATED S PNEUM8 IGG SER-MCNC: 0.2 MCG/ML — SIGNIFICANT CHANGE UP
DEPRECATED S PNEUM9 IGG SER-MCNC: 0.2 MCG/ML — SIGNIFICANT CHANGE UP
DEPRECATED S PNEUM9 IGG SER-MCNC: 1 MCG/ML — SIGNIFICANT CHANGE UP
IMMUNOLOGIST REVIEW: SIGNIFICANT CHANGE UP
S PNEUM SEROTYPE IGG SER-IMP: 0.1 MCG/ML — SIGNIFICANT CHANGE UP
S PNEUM SEROTYPE IGG SER-IMP: 0.2 MCG/ML — SIGNIFICANT CHANGE UP
S PNEUM SEROTYPE IGG SER-IMP: 0.3 MCG/ML — SIGNIFICANT CHANGE UP
S PNEUM SEROTYPE IGG SER-IMP: 0.4 MCG/ML — SIGNIFICANT CHANGE UP
S PNEUM SEROTYPE IGG SER-IMP: 0.8 MCG/ML — SIGNIFICANT CHANGE UP
S PNEUM SEROTYPE IGG SER-IMP: 1.6 MCG/ML — SIGNIFICANT CHANGE UP
S PNEUM SEROTYPE IGG SER-IMP: <0.1 MCG/ML — SIGNIFICANT CHANGE UP
SPECIMEN SOURCE: SIGNIFICANT CHANGE UP
SPECIMEN SOURCE: SIGNIFICANT CHANGE UP
STREPTOCOCCUS PNEUMONIAE IGG SEROTYPE INTERPRETATION: SIGNIFICANT CHANGE UP

## 2024-06-08 RX ORDER — PANTOPRAZOLE SODIUM 20 MG/1
1 TABLET, DELAYED RELEASE ORAL
Qty: 4 | Refills: 0
Start: 2024-06-08 | End: 2024-06-11

## 2024-06-13 DIAGNOSIS — Z95.5 PRESENCE OF CORONARY ANGIOPLASTY IMPLANT AND GRAFT: ICD-10-CM

## 2024-06-13 DIAGNOSIS — K22.5 DIVERTICULUM OF ESOPHAGUS, ACQUIRED: ICD-10-CM

## 2024-06-13 DIAGNOSIS — R73.9 HYPERGLYCEMIA, UNSPECIFIED: ICD-10-CM

## 2024-06-13 DIAGNOSIS — J96.02 ACUTE RESPIRATORY FAILURE WITH HYPERCAPNIA: ICD-10-CM

## 2024-06-13 DIAGNOSIS — J44.1 CHRONIC OBSTRUCTIVE PULMONARY DISEASE WITH (ACUTE) EXACERBATION: ICD-10-CM

## 2024-06-13 DIAGNOSIS — F17.210 NICOTINE DEPENDENCE, CIGARETTES, UNCOMPLICATED: ICD-10-CM

## 2024-06-13 DIAGNOSIS — I25.10 ATHEROSCLEROTIC HEART DISEASE OF NATIVE CORONARY ARTERY WITHOUT ANGINA PECTORIS: ICD-10-CM

## 2024-06-13 DIAGNOSIS — I10 ESSENTIAL (PRIMARY) HYPERTENSION: ICD-10-CM

## 2024-06-13 DIAGNOSIS — E78.5 HYPERLIPIDEMIA, UNSPECIFIED: ICD-10-CM

## 2024-06-13 DIAGNOSIS — Z66 DO NOT RESUSCITATE: ICD-10-CM

## 2024-06-13 DIAGNOSIS — D84.89 OTHER IMMUNODEFICIENCIES: ICD-10-CM

## 2024-06-27 ENCOUNTER — APPOINTMENT (OUTPATIENT)
Dept: PULMONOLOGY | Facility: CLINIC | Age: 79
End: 2024-06-27

## 2024-06-27 VITALS
BODY MASS INDEX: 22.98 KG/M2 | WEIGHT: 143 LBS | DIASTOLIC BLOOD PRESSURE: 68 MMHG | HEIGHT: 66 IN | SYSTOLIC BLOOD PRESSURE: 130 MMHG

## 2024-06-27 DIAGNOSIS — J44.9 CHRONIC OBSTRUCTIVE PULMONARY DISEASE, UNSPECIFIED: ICD-10-CM

## 2024-06-27 DIAGNOSIS — G47.33 OBSTRUCTIVE SLEEP APNEA (ADULT) (PEDIATRIC): ICD-10-CM

## 2024-06-27 DIAGNOSIS — R06.02 SHORTNESS OF BREATH: ICD-10-CM

## 2024-06-27 PROCEDURE — 94729 DIFFUSING CAPACITY: CPT

## 2024-06-27 PROCEDURE — 94010 BREATHING CAPACITY TEST: CPT

## 2024-06-27 PROCEDURE — 99213 OFFICE O/P EST LOW 20 MIN: CPT | Mod: 25

## 2024-06-27 PROCEDURE — 94727 GAS DIL/WSHOT DETER LNG VOL: CPT

## 2024-08-29 ENCOUNTER — INPATIENT (INPATIENT)
Facility: HOSPITAL | Age: 79
LOS: 7 days | Discharge: HOME HEALTH | DRG: 192 | End: 2024-09-06
Attending: HOSPITALIST | Admitting: INTERNAL MEDICINE
Payer: MEDICARE

## 2024-08-29 VITALS
DIASTOLIC BLOOD PRESSURE: 90 MMHG | RESPIRATION RATE: 20 BRPM | TEMPERATURE: 98 F | HEIGHT: 65 IN | HEART RATE: 111 BPM | OXYGEN SATURATION: 98 % | WEIGHT: 139.99 LBS | SYSTOLIC BLOOD PRESSURE: 152 MMHG

## 2024-08-29 DIAGNOSIS — J44.9 CHRONIC OBSTRUCTIVE PULMONARY DISEASE, UNSPECIFIED: ICD-10-CM

## 2024-08-29 DIAGNOSIS — Z90.710 ACQUIRED ABSENCE OF BOTH CERVIX AND UTERUS: Chronic | ICD-10-CM

## 2024-08-29 LAB
ALBUMIN SERPL ELPH-MCNC: 4.6 G/DL — SIGNIFICANT CHANGE UP (ref 3.5–5.2)
ALP SERPL-CCNC: 123 U/L — HIGH (ref 30–115)
ALT FLD-CCNC: 25 U/L — SIGNIFICANT CHANGE UP (ref 0–41)
ANION GAP SERPL CALC-SCNC: 12 MMOL/L — SIGNIFICANT CHANGE UP (ref 7–14)
AST SERPL-CCNC: 30 U/L — SIGNIFICANT CHANGE UP (ref 0–41)
BASE EXCESS BLDV CALC-SCNC: 0.2 MMOL/L — SIGNIFICANT CHANGE UP (ref -2–3)
BASE EXCESS BLDV CALC-SCNC: 1.5 MMOL/L — SIGNIFICANT CHANGE UP (ref -2–3)
BASOPHILS # BLD AUTO: 0.06 K/UL — SIGNIFICANT CHANGE UP (ref 0–0.2)
BASOPHILS NFR BLD AUTO: 0.9 % — SIGNIFICANT CHANGE UP (ref 0–1)
BILIRUB SERPL-MCNC: 0.6 MG/DL — SIGNIFICANT CHANGE UP (ref 0.2–1.2)
BUN SERPL-MCNC: 10 MG/DL — SIGNIFICANT CHANGE UP (ref 10–20)
CA-I SERPL-SCNC: 1.24 MMOL/L — SIGNIFICANT CHANGE UP (ref 1.15–1.33)
CA-I SERPL-SCNC: 1.24 MMOL/L — SIGNIFICANT CHANGE UP (ref 1.15–1.33)
CALCIUM SERPL-MCNC: 9.3 MG/DL — SIGNIFICANT CHANGE UP (ref 8.4–10.5)
CHLORIDE SERPL-SCNC: 100 MMOL/L — SIGNIFICANT CHANGE UP (ref 98–110)
CO2 SERPL-SCNC: 30 MMOL/L — SIGNIFICANT CHANGE UP (ref 17–32)
CREAT SERPL-MCNC: 0.7 MG/DL — SIGNIFICANT CHANGE UP (ref 0.7–1.5)
EGFR: 87 ML/MIN/1.73M2 — SIGNIFICANT CHANGE UP
EOSINOPHIL # BLD AUTO: 0.03 K/UL — SIGNIFICANT CHANGE UP (ref 0–0.7)
EOSINOPHIL NFR BLD AUTO: 0.5 % — SIGNIFICANT CHANGE UP (ref 0–8)
GAS PNL BLDV: 134 MMOL/L — LOW (ref 136–145)
GAS PNL BLDV: 135 MMOL/L — LOW (ref 136–145)
GAS PNL BLDV: SIGNIFICANT CHANGE UP
GAS PNL BLDV: SIGNIFICANT CHANGE UP
GLUCOSE BLDC GLUCOMTR-MCNC: 169 MG/DL — HIGH (ref 70–99)
GLUCOSE SERPL-MCNC: 141 MG/DL — HIGH (ref 70–99)
HCO3 BLDV-SCNC: 32 MMOL/L — HIGH (ref 22–29)
HCO3 BLDV-SCNC: 33 MMOL/L — HIGH (ref 22–29)
HCT VFR BLD CALC: 51.2 % — HIGH (ref 37–47)
HCT VFR BLDA CALC: 46 % — SIGNIFICANT CHANGE UP (ref 34.5–46.5)
HCT VFR BLDA CALC: 57 % — CRITICAL HIGH (ref 34.5–46.5)
HGB BLD CALC-MCNC: 15.4 G/DL — SIGNIFICANT CHANGE UP (ref 11.7–16.1)
HGB BLD CALC-MCNC: 19.1 G/DL — CRITICAL HIGH (ref 11.7–16.1)
HGB BLD-MCNC: 16 G/DL — SIGNIFICANT CHANGE UP (ref 12–16)
IMM GRANULOCYTES NFR BLD AUTO: 0.3 % — SIGNIFICANT CHANGE UP (ref 0.1–0.3)
LACTATE BLDV-MCNC: 1.4 MMOL/L — SIGNIFICANT CHANGE UP (ref 0.5–2)
LACTATE BLDV-MCNC: 1.5 MMOL/L — SIGNIFICANT CHANGE UP (ref 0.5–2)
LYMPHOCYTES # BLD AUTO: 1.24 K/UL — SIGNIFICANT CHANGE UP (ref 1.2–3.4)
LYMPHOCYTES # BLD AUTO: 18.9 % — LOW (ref 20.5–51.1)
MCHC RBC-ENTMCNC: 30.7 PG — SIGNIFICANT CHANGE UP (ref 27–31)
MCHC RBC-ENTMCNC: 31.3 G/DL — LOW (ref 32–37)
MCV RBC AUTO: 98.3 FL — SIGNIFICANT CHANGE UP (ref 81–99)
MONOCYTES # BLD AUTO: 0.55 K/UL — SIGNIFICANT CHANGE UP (ref 0.1–0.6)
MONOCYTES NFR BLD AUTO: 8.4 % — SIGNIFICANT CHANGE UP (ref 1.7–9.3)
NEUTROPHILS # BLD AUTO: 4.67 K/UL — SIGNIFICANT CHANGE UP (ref 1.4–6.5)
NEUTROPHILS NFR BLD AUTO: 71 % — SIGNIFICANT CHANGE UP (ref 42.2–75.2)
NRBC # BLD: 0 /100 WBCS — SIGNIFICANT CHANGE UP (ref 0–0)
NT-PROBNP SERPL-SCNC: 295 PG/ML — SIGNIFICANT CHANGE UP (ref 0–300)
PCO2 BLDV: 81 MMHG — CRITICAL HIGH (ref 39–42)
PCO2 BLDV: 88 MMHG — CRITICAL HIGH (ref 39–42)
PH BLDV: 7.18 — CRITICAL LOW (ref 7.32–7.43)
PH BLDV: 7.21 — LOW (ref 7.32–7.43)
PLATELET # BLD AUTO: 168 K/UL — SIGNIFICANT CHANGE UP (ref 130–400)
PMV BLD: 10.9 FL — HIGH (ref 7.4–10.4)
PO2 BLDV: 43 MMHG — SIGNIFICANT CHANGE UP (ref 25–45)
PO2 BLDV: 55 MMHG — HIGH (ref 25–45)
POTASSIUM BLDV-SCNC: 4.2 MMOL/L — SIGNIFICANT CHANGE UP (ref 3.5–5.1)
POTASSIUM BLDV-SCNC: 4.3 MMOL/L — SIGNIFICANT CHANGE UP (ref 3.5–5.1)
POTASSIUM SERPL-MCNC: 4.6 MMOL/L — SIGNIFICANT CHANGE UP (ref 3.5–5)
POTASSIUM SERPL-SCNC: 4.6 MMOL/L — SIGNIFICANT CHANGE UP (ref 3.5–5)
PROT SERPL-MCNC: 6.9 G/DL — SIGNIFICANT CHANGE UP (ref 6–8)
RBC # BLD: 5.21 M/UL — SIGNIFICANT CHANGE UP (ref 4.2–5.4)
RBC # FLD: 13.8 % — SIGNIFICANT CHANGE UP (ref 11.5–14.5)
SAO2 % BLDV: 73.5 % — SIGNIFICANT CHANGE UP (ref 67–88)
SAO2 % BLDV: 82 % — SIGNIFICANT CHANGE UP (ref 67–88)
SODIUM SERPL-SCNC: 142 MMOL/L — SIGNIFICANT CHANGE UP (ref 135–146)
TROPONIN T, HIGH SENSITIVITY RESULT: 14 NG/L — HIGH (ref 6–13)
WBC # BLD: 6.57 K/UL — SIGNIFICANT CHANGE UP (ref 4.8–10.8)
WBC # FLD AUTO: 6.57 K/UL — SIGNIFICANT CHANGE UP (ref 4.8–10.8)

## 2024-08-29 PROCEDURE — 99291 CRITICAL CARE FIRST HOUR: CPT

## 2024-08-29 PROCEDURE — 80061 LIPID PANEL: CPT

## 2024-08-29 PROCEDURE — 93010 ELECTROCARDIOGRAM REPORT: CPT

## 2024-08-29 PROCEDURE — 85018 HEMOGLOBIN: CPT

## 2024-08-29 PROCEDURE — 82803 BLOOD GASES ANY COMBINATION: CPT

## 2024-08-29 PROCEDURE — 71045 X-RAY EXAM CHEST 1 VIEW: CPT

## 2024-08-29 PROCEDURE — 97530 THERAPEUTIC ACTIVITIES: CPT | Mod: GP

## 2024-08-29 PROCEDURE — 80048 BASIC METABOLIC PNL TOTAL CA: CPT

## 2024-08-29 PROCEDURE — 85014 HEMATOCRIT: CPT

## 2024-08-29 PROCEDURE — 94640 AIRWAY INHALATION TREATMENT: CPT

## 2024-08-29 PROCEDURE — 92526 ORAL FUNCTION THERAPY: CPT | Mod: GN

## 2024-08-29 PROCEDURE — 84100 ASSAY OF PHOSPHORUS: CPT

## 2024-08-29 PROCEDURE — 93970 EXTREMITY STUDY: CPT

## 2024-08-29 PROCEDURE — 0225U NFCT DS DNA&RNA 21 SARSCOV2: CPT

## 2024-08-29 PROCEDURE — 71045 X-RAY EXAM CHEST 1 VIEW: CPT | Mod: 26

## 2024-08-29 PROCEDURE — 85025 COMPLETE CBC W/AUTO DIFF WBC: CPT

## 2024-08-29 PROCEDURE — 85730 THROMBOPLASTIN TIME PARTIAL: CPT

## 2024-08-29 PROCEDURE — 84132 ASSAY OF SERUM POTASSIUM: CPT

## 2024-08-29 PROCEDURE — 85379 FIBRIN DEGRADATION QUANT: CPT

## 2024-08-29 PROCEDURE — 36415 COLL VENOUS BLD VENIPUNCTURE: CPT

## 2024-08-29 PROCEDURE — 82962 GLUCOSE BLOOD TEST: CPT

## 2024-08-29 PROCEDURE — 82330 ASSAY OF CALCIUM: CPT

## 2024-08-29 PROCEDURE — 94660 CPAP INITIATION&MGMT: CPT

## 2024-08-29 PROCEDURE — 94760 N-INVAS EAR/PLS OXIMETRY 1: CPT

## 2024-08-29 PROCEDURE — 87640 STAPH A DNA AMP PROBE: CPT

## 2024-08-29 PROCEDURE — 83036 HEMOGLOBIN GLYCOSYLATED A1C: CPT

## 2024-08-29 PROCEDURE — 83605 ASSAY OF LACTIC ACID: CPT

## 2024-08-29 PROCEDURE — 92610 EVALUATE SWALLOWING FUNCTION: CPT | Mod: GN

## 2024-08-29 PROCEDURE — 80053 COMPREHEN METABOLIC PANEL: CPT

## 2024-08-29 PROCEDURE — 84443 ASSAY THYROID STIM HORMONE: CPT

## 2024-08-29 PROCEDURE — 97116 GAIT TRAINING THERAPY: CPT | Mod: GP

## 2024-08-29 PROCEDURE — 84295 ASSAY OF SERUM SODIUM: CPT

## 2024-08-29 PROCEDURE — 85610 PROTHROMBIN TIME: CPT

## 2024-08-29 PROCEDURE — 87641 MR-STAPH DNA AMP PROBE: CPT

## 2024-08-29 PROCEDURE — 97162 PT EVAL MOD COMPLEX 30 MIN: CPT | Mod: GP

## 2024-08-29 PROCEDURE — 84145 PROCALCITONIN (PCT): CPT

## 2024-08-29 PROCEDURE — 83735 ASSAY OF MAGNESIUM: CPT

## 2024-08-29 PROCEDURE — 84484 ASSAY OF TROPONIN QUANT: CPT

## 2024-08-29 RX ORDER — IPRATROPIUM BROMIDE AND ALBUTEROL SULFATE .5; 3 MG/3ML; MG/3ML
3 SOLUTION RESPIRATORY (INHALATION) ONCE
Refills: 0 | Status: COMPLETED | OUTPATIENT
Start: 2024-08-29 | End: 2024-08-29

## 2024-08-29 RX ORDER — AMLODIPINE BESYLATE 10 MG/1
5 TABLET ORAL DAILY
Refills: 0 | Status: DISCONTINUED | OUTPATIENT
Start: 2024-08-29 | End: 2024-09-06

## 2024-08-29 RX ORDER — METHYLPREDNISOLONE 4 MG
125 TABLET ORAL ONCE
Refills: 0 | Status: COMPLETED | OUTPATIENT
Start: 2024-08-29 | End: 2024-08-29

## 2024-08-29 RX ORDER — DEXTROSE 15 G/33 G
12.5 GEL IN PACKET (GRAM) ORAL ONCE
Refills: 0 | Status: DISCONTINUED | OUTPATIENT
Start: 2024-08-29 | End: 2024-09-06

## 2024-08-29 RX ORDER — DEXTROSE 15 G/33 G
15 GEL IN PACKET (GRAM) ORAL ONCE
Refills: 0 | Status: DISCONTINUED | OUTPATIENT
Start: 2024-08-29 | End: 2024-09-06

## 2024-08-29 RX ORDER — GLUCAGON INJECTION, SOLUTION 1 MG/.2ML
1 INJECTION, SOLUTION SUBCUTANEOUS ONCE
Refills: 0 | Status: DISCONTINUED | OUTPATIENT
Start: 2024-08-29 | End: 2024-09-06

## 2024-08-29 RX ORDER — AZITHROMYCIN 500 MG/1
500 TABLET, FILM COATED ORAL DAILY
Refills: 0 | Status: COMPLETED | OUTPATIENT
Start: 2024-08-29 | End: 2024-09-03

## 2024-08-29 RX ORDER — BUDESONIDE AND FORMOTEROL FUMARATE 80; 4.5 UG/1; UG/1
2 AEROSOL, METERED RESPIRATORY (INHALATION)
Refills: 0 | Status: DISCONTINUED | OUTPATIENT
Start: 2024-08-29 | End: 2024-09-06

## 2024-08-29 RX ORDER — DEXTROSE 15 G/33 G
25 GEL IN PACKET (GRAM) ORAL ONCE
Refills: 0 | Status: DISCONTINUED | OUTPATIENT
Start: 2024-08-29 | End: 2024-09-06

## 2024-08-29 RX ORDER — IPRATROPIUM BROMIDE 0.5 MG/2.5ML
500 SOLUTION RESPIRATORY (INHALATION) EVERY 6 HOURS
Refills: 0 | Status: DISCONTINUED | OUTPATIENT
Start: 2024-08-29 | End: 2024-09-06

## 2024-08-29 RX ORDER — METHYLPREDNISOLONE 4 MG
40 TABLET ORAL
Refills: 0 | Status: DISCONTINUED | OUTPATIENT
Start: 2024-08-29 | End: 2024-08-30

## 2024-08-29 RX ORDER — IPRATROPIUM BROMIDE AND ALBUTEROL SULFATE .5; 3 MG/3ML; MG/3ML
3 SOLUTION RESPIRATORY (INHALATION) EVERY 6 HOURS
Refills: 0 | Status: DISCONTINUED | OUTPATIENT
Start: 2024-08-29 | End: 2024-09-06

## 2024-08-29 RX ORDER — HEPARIN SODIUM,BOVINE 1000/ML
5000 VIAL (ML) INJECTION EVERY 12 HOURS
Refills: 0 | Status: DISCONTINUED | OUTPATIENT
Start: 2024-08-29 | End: 2024-09-06

## 2024-08-29 RX ADMIN — Medication 40 MILLIGRAM(S): at 23:38

## 2024-08-29 RX ADMIN — IPRATROPIUM BROMIDE AND ALBUTEROL SULFATE 3 MILLILITER(S): .5; 3 SOLUTION RESPIRATORY (INHALATION) at 14:55

## 2024-08-29 RX ADMIN — Medication 10 MILLIGRAM(S): at 23:38

## 2024-08-29 RX ADMIN — IPRATROPIUM BROMIDE AND ALBUTEROL SULFATE 3 MILLILITER(S): .5; 3 SOLUTION RESPIRATORY (INHALATION) at 23:38

## 2024-08-29 RX ADMIN — IPRATROPIUM BROMIDE AND ALBUTEROL SULFATE 3 MILLILITER(S): .5; 3 SOLUTION RESPIRATORY (INHALATION) at 15:11

## 2024-08-29 RX ADMIN — IPRATROPIUM BROMIDE 500 MICROGRAM(S): 0.5 SOLUTION RESPIRATORY (INHALATION) at 23:38

## 2024-08-29 RX ADMIN — Medication 125 MILLIGRAM(S): at 15:01

## 2024-08-29 RX ADMIN — IPRATROPIUM BROMIDE AND ALBUTEROL SULFATE 3 MILLILITER(S): .5; 3 SOLUTION RESPIRATORY (INHALATION) at 14:45

## 2024-08-29 NOTE — ED PROVIDER NOTE - EKG/XRAY ADDITIONAL INFORMATION
independent interpretation of the ekg performed by Dr. Jorge Blanc shows Normal sinus rhythm heart rate 90 [place] interval 126 QRS 82 QTc 440 no elevations or depressions independent interpretation of the ekg performed by Dr. Jorge Blanc shows Normal sinus rhythm heart rate 90 ME interval 126 QRS 82 QTc 440 no elevations or depressions    independent interpretation of ED X-Ray films performed by Dr. Jorge Blanc shows No infiltrate no pneumothorax

## 2024-08-29 NOTE — ED PROVIDER NOTE - ATTENDING CONTRIBUTION TO CARE
78-year-old female past medical history of COPD On home O2 currently but noncompliant current smoker, hypertension, hyperlipidemia, CAD status post stents Who is presenting with shortness of breath that started today after she took a bath.  Described as coughing with chest tightness and wheezing.  On arrival patient was tachypneic requiring oxygen belly breathing.  After she was started on BiPAP patient's breathing improved.  Now able to speak full sentences.  There is diffuse wheezing bilaterally.  Plan is to provide nebs steroids and obtain chest x-ray and labs

## 2024-08-29 NOTE — H&P ADULT - HISTORY OF PRESENT ILLNESS
78-year-old female past medical history of COPD not on home O2, current smoker, hypertension, hyperlipidemia, CAD status post stent 2019, hx of dysphagia and zenker diverticulum present for shortness of breath. to note that the patient was recently discharged in junefor COPD exacerbation     Blood work noted for Trop 14, FU repeat   16:10 - VBG - pH: 7.21  | pCO2: 81    | pO2: 43    | Lactate: 1.5    14:47 - VBG - pH: 7.18  | pCO2: 88    | pO2: 55    | Lactate: 1.4      CXR showed hyperinflated lungs otherwise unremarkable     In Ed the patient was given Duonebs and solumedrol     ICU Vital Signs Last 24 Hrs  T(C): 36.8 (29 Aug 2024 14:28), Max: 36.8 (29 Aug 2024 14:28)  T(F): 98.3 (29 Aug 2024 14:28), Max: 98.3 (29 Aug 2024 14:28)  HR: 111 (29 Aug 2024 14:28) (111 - 111)  BP: 152/90 (29 Aug 2024 14:28) (152/90 - 152/90)  BP(mean): --  ABP: --  ABP(mean): --  RR: 20 (29 Aug 2024 14:28) (20 - 20)  SpO2: 98% (29 Aug 2024 14:28) (98% - 98%)    O2 Parameters below as of 29 Aug 2024 14:28  Patient On (Oxygen Delivery Method): room air           78-year-old female past medical history of COPD not on home O2, current smoker, hypertension, hyperlipidemia, CAD status post stent 2019, hx of dysphagia and zenker diverticulum present for shortness of breath. to note that the patient was recently discharged in june for COPD exacerbation. history goes back to yesterday when the patient started having dizziness and shorntess of breath,. during my examination i removed the BiPAP to speak and see how she would tolerate  off BiPAP the patient desaturated over a duration of 2 mintues to low 80s, BiPAP was placed back on and the patient's saturation got better. Patient denies any CP, headache, urinary symptoms. I spoke to the son and he confirmed that the patient is not very compliant with her medications especially her nebulizers. No fevers or chills reported. On examination patient is significantly wheezing. PAtient is admitted for COPD excacerbation ;    Blood work noted for Trop 14, FU repeat   16:10 - VBG - pH: 7.21  | pCO2: 81    | pO2: 43    | Lactate: 1.5    14:47 - VBG - pH: 7.18  | pCO2: 88    | pO2: 55    | Lactate: 1.4      CXR showed hyperinflated lungs otherwise unremarkable     In Ed the patient was given Duonebs and solumedrol     ICU Vital Signs Last 24 Hrs  T(C): 36.8 (29 Aug 2024 14:28), Max: 36.8 (29 Aug 2024 14:28)  T(F): 98.3 (29 Aug 2024 14:28), Max: 98.3 (29 Aug 2024 14:28)  HR: 111 (29 Aug 2024 14:28) (111 - 111)  BP: 152/90 (29 Aug 2024 14:28) (152/90 - 152/90)  BP(mean): --  ABP: --  ABP(mean): --  RR: 20 (29 Aug 2024 14:28) (20 - 20)  SpO2: 98% (29 Aug 2024 14:28) (98% - 98%)    O2 Parameters below as of 29 Aug 2024 14:28  Patient On (Oxygen Delivery Method): room air

## 2024-08-29 NOTE — ED ADULT NURSE REASSESSMENT NOTE - NS ED NURSE REASSESS COMMENT FT1
Daughter and son requesting to be contacted for any updates - contact info are also in the PT facesheet

## 2024-08-29 NOTE — ED ADULT NURSE NOTE - NSFALLHARMRISKINTERV_ED_ALL_ED

## 2024-08-29 NOTE — H&P ADULT - ATTENDING COMMENTS
#Acute hypoxic and hypercapnic resp failure  presumed copd exacerbation  cxr clear  check va duplex, d-dimer  rvp  azithro  solumedrol 60 iv tid  repeat blood gas  bipap prn, qhs  nc to keep spo2 >88  duoneb q6, symbicort    #Progress Note Handoff  Pending (specify): iv steroids, azithro, duplex  Family discussion: d/w pt at bedside  Disposition: home vs. snf

## 2024-08-29 NOTE — ED PROVIDER NOTE - OBJECTIVE STATEMENT
78-year-old female with a past medical history of HTN, HLD, CAD, COPD on home O2 presents complaining of worsening shortness of breath over the past 3 to 4 days. pt denies any other symptoms including fevers, chill, headache, recent illness/travel, cough, abdominal pain, chest pain.

## 2024-08-29 NOTE — H&P ADULT - ASSESSMENT
#COPD Excacerbation   16:10 - VBG - pH: 7.21  | pCO2: 81    | pO2: 43    | Lactate: 1.5    14:47 - VBG - pH: 7.18  | pCO2: 88    | pO2: 55    | Lactate: 1.4    - CXR showed hyperinflation of the lungs with no consolidation  - S/p Solu-medrol and duoneb in ED   - cw solu-medrol 40 IV BID  - cw duoneb, symbicort, spiriva   - azithromycin x 5 days   - procal / mrsa / legionella / strep  - bipap 4 onn 4 off  - FU CBC CMP TSH A1c Lipid panel  - FU sputum cultures   - FU RVP   - ID consult for full RVP     # hx of dysphagia  - FU S&S     #CAD sp PCI in 2019  #HLD  - cw home medications     #HTN  - cw home medications     Diet: DASH  Activity: As tolerated   DVT Prophylaxis: heparin   GI Prophylaxis: protonix   Code Status: DNR/DNI   Disposition: SDU      78-year-old female past medical history of COPD not on home O2, current smoker, hypertension, hyperlipidemia, CAD status post stent 2019, hx of dysphagia and zenker diverticulum present for shortness of breath. to note that the patient was recently discharged in june for COPD exacerbation. history goes back to yesterday when the patient started having dizziness and shorntess of breath,. during my examination i removed the BiPAP to speak and see how she would tolerate  off BiPAP the patient desaturated over a duration of 2 mintues to low 80s, BiPAP was placed back on and the patient's saturation got better. Patient denies any CP, headache, urinary symptoms. I spoke to the son and he confirmed that the patient is not very compliant with her medications especially her nebulizers. No fevers or chills reported. On examination patient is significantly wheezing. PAtient is admitted for COPD excacerbation ;    #COPD Excacerbation   16:10 - VBG - pH: 7.21  | pCO2: 81    | pO2: 43    | Lactate: 1.5    14:47 - VBG - pH: 7.18  | pCO2: 88    | pO2: 55    | Lactate: 1.4    - CXR showed hyperinflation of the lungs with no consolidation  - S/p Solu-medrol and duoneb in ED   - cw solu-medrol 40 IV BID  - cw duoneb, symbicort, spiriva   - azithromycin x 5 days   - procal / mrsa / legionella / strep  - bipap 4 onn 4 off  - FU CBC CMP TSH A1c Lipid panel  - FU sputum cultures   - FU RVP   - ID consult for full RVP     # hx of dysphagia  - FU S&S     #CAD sp PCI in 2019  #HLD  - cw home medications     #HTN  - cw home medications     Diet: DASH  Activity: As tolerated   DVT Prophylaxis: heparin   GI Prophylaxis: protonix   Code Status: DNR/DNI   Disposition: SDU

## 2024-08-29 NOTE — H&P ADULT - NSHPLABSRESULTS_GEN_ALL_CORE
LABS:                        16.0   6.57  )-----------( 168      ( 29 Aug 2024 15:15 )             51.2     08-29    142  |  100  |  10  ----------------------------<  141<H>  4.6   |  30  |  0.7    Ca    9.3      29 Aug 2024 15:15    TPro  6.9  /  Alb  4.6  /  TBili  0.6  /  DBili  x   /  AST  30  /  ALT  25  /  AlkPhos  123<H>  08-29

## 2024-08-29 NOTE — H&P ADULT - NSHPPHYSICALEXAM_GEN_ALL_CORE
General: AOx3, NAD   chest: GBAe, no crackles or wheezes   Heart: RRr, no murmur  abdomen: soft, non-tender, non-distended   Extremities + PP, no edema General: AOx3, NAD   chest: GBAe, no crackles. BL expiratory wheezes   Heart: RRr, no murmur  abdomen: soft, non-tender, non-distended   Extremities + PP, no edema

## 2024-08-29 NOTE — ED PROVIDER NOTE - PHYSICAL EXAMINATION
Gen: NAD, AOx3  Head: NCAT  HEENT: PERRL, oral mucosa moist, normal conjunctiva, oropharynx clear without exudate or erythema  Lung: respiratory distress, diffuse wheezing  CV: normal s1/s2, rrr, Normal perfusion, pulses 2+ throughout  Abd: soft, NTND, no CVA tenderness  Genitourinary: no pelvic tenderness  MSK: No edema, no visible deformities, full range of motion in all 4 extremities  Neuro: CN II-XII grossly intact, No focal neurologic deficits  Skin: No rash   Psych: normal affect

## 2024-08-29 NOTE — ED PROVIDER NOTE - INPATIENT RECORD SUMMARY
No infiltrate no pneumothorax inpatient notes reviewed from June 2024 admitted for acute on chronic COPD exacerbation inpatient notes reviewed from June 2024 admitted for acute on chronic COPD exacerbation

## 2024-08-29 NOTE — ED PROVIDER NOTE - CLINICAL SUMMARY MEDICAL DECISION MAKING FREE TEXT BOX
Patient evaluated for shortness of breath likely secondary to COPD given steroids and albuterol with Atrovent.  Placed on BiPAP for work of breathing with slight improvement in pCO2 and pH.  Patient placed on stepdown for further evaluation and management

## 2024-08-30 ENCOUNTER — APPOINTMENT (OUTPATIENT)
Dept: PULMONOLOGY | Facility: CLINIC | Age: 79
End: 2024-08-30

## 2024-08-30 LAB
A1C WITH ESTIMATED AVERAGE GLUCOSE RESULT: 6.7 % — HIGH (ref 4–5.6)
ALBUMIN SERPL ELPH-MCNC: 4.1 G/DL — SIGNIFICANT CHANGE UP (ref 3.5–5.2)
ALP SERPL-CCNC: 107 U/L — SIGNIFICANT CHANGE UP (ref 30–115)
ALT FLD-CCNC: 21 U/L — SIGNIFICANT CHANGE UP (ref 0–41)
ANION GAP SERPL CALC-SCNC: 12 MMOL/L — SIGNIFICANT CHANGE UP (ref 7–14)
APTT BLD: 35.5 SEC — SIGNIFICANT CHANGE UP (ref 27–39.2)
AST SERPL-CCNC: 22 U/L — SIGNIFICANT CHANGE UP (ref 0–41)
BASOPHILS # BLD AUTO: 0.01 K/UL — SIGNIFICANT CHANGE UP (ref 0–0.2)
BASOPHILS NFR BLD AUTO: 0.2 % — SIGNIFICANT CHANGE UP (ref 0–1)
BILIRUB SERPL-MCNC: 0.5 MG/DL — SIGNIFICANT CHANGE UP (ref 0.2–1.2)
BUN SERPL-MCNC: 22 MG/DL — HIGH (ref 10–20)
CALCIUM SERPL-MCNC: 9 MG/DL — SIGNIFICANT CHANGE UP (ref 8.4–10.4)
CHLORIDE SERPL-SCNC: 99 MMOL/L — SIGNIFICANT CHANGE UP (ref 98–110)
CHOLEST SERPL-MCNC: 149 MG/DL — SIGNIFICANT CHANGE UP
CO2 SERPL-SCNC: 29 MMOL/L — SIGNIFICANT CHANGE UP (ref 17–32)
CREAT SERPL-MCNC: 0.8 MG/DL — SIGNIFICANT CHANGE UP (ref 0.7–1.5)
EGFR: 74 ML/MIN/1.73M2 — SIGNIFICANT CHANGE UP
EOSINOPHIL # BLD AUTO: 0 K/UL — SIGNIFICANT CHANGE UP (ref 0–0.7)
EOSINOPHIL NFR BLD AUTO: 0 % — SIGNIFICANT CHANGE UP (ref 0–8)
ESTIMATED AVERAGE GLUCOSE: 146 MG/DL — HIGH (ref 68–114)
GAS PNL BLDA: SIGNIFICANT CHANGE UP
GLUCOSE BLDC GLUCOMTR-MCNC: 181 MG/DL — HIGH (ref 70–99)
GLUCOSE BLDC GLUCOMTR-MCNC: 221 MG/DL — HIGH (ref 70–99)
GLUCOSE BLDC GLUCOMTR-MCNC: 251 MG/DL — HIGH (ref 70–99)
GLUCOSE BLDC GLUCOMTR-MCNC: 271 MG/DL — HIGH (ref 70–99)
GLUCOSE SERPL-MCNC: 230 MG/DL — HIGH (ref 70–99)
HCT VFR BLD CALC: 47.7 % — HIGH (ref 37–47)
HDLC SERPL-MCNC: 71 MG/DL — SIGNIFICANT CHANGE UP
HGB BLD-MCNC: 15.1 G/DL — SIGNIFICANT CHANGE UP (ref 12–16)
IMM GRANULOCYTES NFR BLD AUTO: 0.2 % — SIGNIFICANT CHANGE UP (ref 0.1–0.3)
INR BLD: 1.22 RATIO — SIGNIFICANT CHANGE UP (ref 0.65–1.3)
LIPID PNL WITH DIRECT LDL SERPL: 65 MG/DL — SIGNIFICANT CHANGE UP
LYMPHOCYTES # BLD AUTO: 0.55 K/UL — LOW (ref 1.2–3.4)
LYMPHOCYTES # BLD AUTO: 13.5 % — LOW (ref 20.5–51.1)
MAGNESIUM SERPL-MCNC: 2.4 MG/DL — SIGNIFICANT CHANGE UP (ref 1.8–2.4)
MCHC RBC-ENTMCNC: 30.6 PG — SIGNIFICANT CHANGE UP (ref 27–31)
MCHC RBC-ENTMCNC: 31.7 G/DL — LOW (ref 32–37)
MCV RBC AUTO: 96.6 FL — SIGNIFICANT CHANGE UP (ref 81–99)
MONOCYTES # BLD AUTO: 0.11 K/UL — SIGNIFICANT CHANGE UP (ref 0.1–0.6)
MONOCYTES NFR BLD AUTO: 2.7 % — SIGNIFICANT CHANGE UP (ref 1.7–9.3)
MRSA PCR RESULT.: NEGATIVE — SIGNIFICANT CHANGE UP
NEUTROPHILS # BLD AUTO: 3.4 K/UL — SIGNIFICANT CHANGE UP (ref 1.4–6.5)
NEUTROPHILS NFR BLD AUTO: 83.4 % — HIGH (ref 42.2–75.2)
NON HDL CHOLESTEROL: 78 MG/DL — SIGNIFICANT CHANGE UP
NRBC # BLD: 0 /100 WBCS — SIGNIFICANT CHANGE UP (ref 0–0)
PHOSPHATE SERPL-MCNC: 5.2 MG/DL — HIGH (ref 2.1–4.9)
PLATELET # BLD AUTO: 160 K/UL — SIGNIFICANT CHANGE UP (ref 130–400)
PMV BLD: 10.7 FL — HIGH (ref 7.4–10.4)
POTASSIUM SERPL-MCNC: 5.1 MMOL/L — HIGH (ref 3.5–5)
POTASSIUM SERPL-SCNC: 5.1 MMOL/L — HIGH (ref 3.5–5)
PROCALCITONIN SERPL-MCNC: 0.1 NG/ML — SIGNIFICANT CHANGE UP (ref 0.02–0.1)
PROT SERPL-MCNC: 6.3 G/DL — SIGNIFICANT CHANGE UP (ref 6–8)
PROTHROM AB SERPL-ACNC: 13.9 SEC — HIGH (ref 9.95–12.87)
RBC # BLD: 4.94 M/UL — SIGNIFICANT CHANGE UP (ref 4.2–5.4)
RBC # FLD: 13.2 % — SIGNIFICANT CHANGE UP (ref 11.5–14.5)
SODIUM SERPL-SCNC: 140 MMOL/L — SIGNIFICANT CHANGE UP (ref 135–146)
TRIGL SERPL-MCNC: 65 MG/DL — SIGNIFICANT CHANGE UP
TROPONIN T, HIGH SENSITIVITY RESULT: 10 NG/L — SIGNIFICANT CHANGE UP (ref 6–13)
TSH SERPL-MCNC: 0.51 UIU/ML — SIGNIFICANT CHANGE UP (ref 0.27–4.2)
WBC # BLD: 4.08 K/UL — LOW (ref 4.8–10.8)
WBC # FLD AUTO: 4.08 K/UL — LOW (ref 4.8–10.8)

## 2024-08-30 PROCEDURE — 99223 1ST HOSP IP/OBS HIGH 75: CPT

## 2024-08-30 PROCEDURE — 93970 EXTREMITY STUDY: CPT | Mod: 26

## 2024-08-30 PROCEDURE — 99291 CRITICAL CARE FIRST HOUR: CPT

## 2024-08-30 RX ORDER — SODIUM ZIRCONIUM CYCLOSILICATE 5 G/5G
5 POWDER, FOR SUSPENSION ORAL
Refills: 0 | Status: COMPLETED | OUTPATIENT
Start: 2024-08-30 | End: 2024-08-31

## 2024-08-30 RX ORDER — ALPRAZOLAM 0.25 MG
0.5 TABLET ORAL AT BEDTIME
Refills: 0 | Status: COMPLETED | OUTPATIENT
Start: 2024-08-30 | End: 2024-09-06

## 2024-08-30 RX ORDER — INSULIN GLARGINE 100 [IU]/ML
10 INJECTION, SOLUTION SUBCUTANEOUS AT BEDTIME
Refills: 0 | Status: ACTIVE | OUTPATIENT
Start: 2024-08-30 | End: 2025-07-29

## 2024-08-30 RX ORDER — CHLORHEXIDINE GLUCONATE 40 MG/ML
1 SOLUTION TOPICAL
Refills: 0 | Status: DISCONTINUED | OUTPATIENT
Start: 2024-08-30 | End: 2024-09-06

## 2024-08-30 RX ORDER — METHYLPREDNISOLONE 4 MG
60 TABLET ORAL EVERY 8 HOURS
Refills: 0 | Status: DISCONTINUED | OUTPATIENT
Start: 2024-08-30 | End: 2024-08-31

## 2024-08-30 RX ADMIN — Medication 4: at 11:04

## 2024-08-30 RX ADMIN — IPRATROPIUM BROMIDE AND ALBUTEROL SULFATE 3 MILLILITER(S): .5; 3 SOLUTION RESPIRATORY (INHALATION) at 08:55

## 2024-08-30 RX ADMIN — CHLORHEXIDINE GLUCONATE 1 APPLICATION(S): 40 SOLUTION TOPICAL at 13:42

## 2024-08-30 RX ADMIN — Medication 0.5 MILLIGRAM(S): at 22:16

## 2024-08-30 RX ADMIN — IPRATROPIUM BROMIDE 500 MICROGRAM(S): 0.5 SOLUTION RESPIRATORY (INHALATION) at 02:15

## 2024-08-30 RX ADMIN — IPRATROPIUM BROMIDE AND ALBUTEROL SULFATE 3 MILLILITER(S): .5; 3 SOLUTION RESPIRATORY (INHALATION) at 20:28

## 2024-08-30 RX ADMIN — Medication 5000 UNIT(S): at 18:00

## 2024-08-30 RX ADMIN — Medication 40 MILLIGRAM(S): at 05:30

## 2024-08-30 RX ADMIN — Medication 60 MILLIGRAM(S): at 13:39

## 2024-08-30 RX ADMIN — AZITHROMYCIN 500 MILLIGRAM(S): 500 TABLET, FILM COATED ORAL at 11:03

## 2024-08-30 RX ADMIN — SODIUM ZIRCONIUM CYCLOSILICATE 5 GRAM(S): 5 POWDER, FOR SUSPENSION ORAL at 18:00

## 2024-08-30 RX ADMIN — INSULIN GLARGINE 10 UNIT(S): 100 INJECTION, SOLUTION SUBCUTANEOUS at 22:16

## 2024-08-30 RX ADMIN — IPRATROPIUM BROMIDE AND ALBUTEROL SULFATE 3 MILLILITER(S): .5; 3 SOLUTION RESPIRATORY (INHALATION) at 02:15

## 2024-08-30 RX ADMIN — IPRATROPIUM BROMIDE AND ALBUTEROL SULFATE 3 MILLILITER(S): .5; 3 SOLUTION RESPIRATORY (INHALATION) at 14:40

## 2024-08-30 RX ADMIN — Medication 10 MILLIGRAM(S): at 22:16

## 2024-08-30 RX ADMIN — Medication 60 MILLIGRAM(S): at 22:16

## 2024-08-30 RX ADMIN — AMLODIPINE BESYLATE 5 MILLIGRAM(S): 10 TABLET ORAL at 11:03

## 2024-08-30 RX ADMIN — Medication 5000 UNIT(S): at 05:30

## 2024-08-30 NOTE — CONSULT NOTE ADULT - ASSESSMENT
IMPRESSION:    Acute on chronic hypoxemic/hypercapnic resp failure  COPD exacerbation  HO hypertension, hyperlipidemia, CAD status post stent 2019,   hx of dysphagia and zenker diverticulum   Active smoker    PLAN:    CNS: Avoid CNS depressant    HEENT:  Oral care    PULMONARY:  HOB @ 45 degrees, aspiration precaution, solumedrol 60 q 8, Neb q 4, OP inhalers    CARDIOVASCULAR: avoid overload, BP control    GI: GI prophylaxis                                          Feeding when off NIV per speech    RENAL:  F/u  lytes.  Correct as needed. accurate I/O    INFECTIOUS DISEASE: azith x 5 days    HEMATOLOGICAL:  DVT prophylaxis. LE doppler    ENDOCRINE:  Follow up FS.  Insulin protocol if needed.  SDU  POOR PROGNOSIS

## 2024-08-30 NOTE — PATIENT PROFILE ADULT - FALL HARM RISK - HARM RISK INTERVENTIONS

## 2024-08-30 NOTE — CONSULT NOTE ADULT - SUBJECTIVE AND OBJECTIVE BOX
Patient is a 78y old  Female who presents with a chief complaint of SOB    78-year-old female past medical history of COPD not on home O2, current smoker, hypertension, hyperlipidemia, CAD status post stent 2019, hx of dysphagia and zenker diverticulum present for shortness of breath. to note that the patient was recently discharged in june for COPD exacerbation. history goes back to yesterday when the patient started having dizziness and shortness of breath,. during my examination i removed the BiPAP to speak and see how she would tolerate  off BiPAP the patient desaturated over a duration of 2 mintues to low 80s, BiPAP was placed back on and the patient's saturation got better. Patient denies any CP, headache, urinary symptoms. I spoke to the son and he confirmed that the patient is not very compliant with her medications especially her nebulizers. No fevers or chills reported. On examination patient is significantly wheezing. PAtient is admitted for COPD excacerbation ;    Blood work noted for Trop 14, FU repeat   16:10 - VBG - pH: 7.21  | pCO2: 81    | pO2: 43    | Lactate: 1.5    14:47 - VBG - pH: 7.18  | pCO2: 88    | pO2: 55    | Lactate: 1.4      CXR showed hyperinflated lungs otherwise unremarkable     In Ed the patient was given Duonebs and solumedrol     ICU Vital Signs Last 24 Hrs  T(C): 36.8 (29 Aug 2024 14:28), Max: 36.8 (29 Aug 2024 14:28)  T(F): 98.3 (29 Aug 2024 14:28), Max: 98.3 (29 Aug 2024 14:28)  HR: 111 (29 Aug 2024 14:28) (111 - 111)  BP: 152/90 (29 Aug 2024 14:28) (152/90 - 152/90)  RR: 20 (29 Aug 2024 14:28) (20 - 20)  SpO2: 98% (29 Aug 2024 14:28) (98% - 98%)    O2 Parameters below as of 29 Aug 2024 14:28    Admitted to SDU, called to evaluate, this am on NC, feels better           (29 Aug 2024 18:13)      PAST MEDICAL & SURGICAL HISTORY:  CVA (cerebral vascular accident)      Anxiety      HTN (hypertension)      COPD (chronic obstructive pulmonary disease)      S/P total abdominal hysterectomy          SOCIAL HX:   Smoking  +    FAMILY HISTORY:  No pertinent family history in first degree relatives        REVIEW OF SYSTEMS see hpi      Allergies    No Known Allergies    Intolerances        albuterol    90 MICROgram(s) HFA Inhaler 2 Puff(s) Inhalation four times a day  albuterol/ipratropium for Nebulization 3 milliLiter(s) Nebulizer every 6 hours  amLODIPine   Tablet 5 milliGRAM(s) Oral daily  atorvastatin 10 milliGRAM(s) Oral at bedtime  azithromycin   Tablet 500 milliGRAM(s) Oral daily  budesonide 160 MICROgram(s)/formoterol 4.5 MICROgram(s) Inhaler 2 Puff(s) Inhalation two times a day  dextrose 5%. 1000 milliLiter(s) IV Continuous <Continuous>  dextrose 5%. 1000 milliLiter(s) IV Continuous <Continuous>  dextrose 50% Injectable 12.5 Gram(s) IV Push once  dextrose 50% Injectable 25 Gram(s) IV Push once  dextrose 50% Injectable 25 Gram(s) IV Push once  dextrose Oral Gel 15 Gram(s) Oral once PRN  glucagon  Injectable 1 milliGRAM(s) IntraMuscular once  heparin   Injectable 5000 Unit(s) SubCutaneous every 12 hours  insulin lispro (ADMELOG) corrective regimen sliding scale   SubCutaneous three times a day before meals  ipratropium    for Nebulization 500 MICROGram(s) Nebulizer every 6 hours  methylPREDNISolone sodium succinate Injectable 40 milliGRAM(s) IV Push two times a day  : Home Meds:      PHYSICAL EXAM    ICU Vital Signs Last 24 Hrs  T(C): 37.1 (30 Aug 2024 05:01), Max: 37.1 (30 Aug 2024 05:01)  T(F): 98.7 (30 Aug 2024 05:01), Max: 98.7 (30 Aug 2024 05:01)  HR: 100 (30 Aug 2024 05:01) (78 - 111)  BP: 150/80 (30 Aug 2024 05:01) (125/72 - 152/90)  RR: 18 (30 Aug 2024 05:01) (18 - 20)  SpO2: 98% (30 Aug 2024 05:01) (98% - 100%)    O2 Parameters below as of 30 Aug 2024 05:01  Patient On (Oxygen Delivery Method): nasal cannula  O2 Flow (L/min): 4          General: ill looking  Lungs: Bilateral exp wheezing  Cardiovascular: KELECHI 2.6  Abdomen: Soft, Positive BS  Extremities: No clubbing  Skin: Warm  Neurological: Non focal         LABS:                          15.1   4.08  )-----------( 160      ( 30 Aug 2024 05:32 )             47.7                                               08-29    142  |  100  |  10  ----------------------------<  141<H>  4.6   |  30  |  0.7    Ca    9.3      29 Aug 2024 15:15    TPro  6.9  /  Alb  4.6  /  TBili  0.6  /  DBili  x   /  AST  30  /  ALT  25  /  AlkPhos  123<H>  08-29      PT/INR - ( 30 Aug 2024 05:32 )   PT: 13.90 sec;   INR: 1.22 ratio         PTT - ( 30 Aug 2024 05:32 )  PTT:35.5 sec                                       Urinalysis Basic - ( 29 Aug 2024 15:15 )    Color: x / Appearance: x / SG: x / pH: x  Gluc: 141 mg/dL / Ketone: x  / Bili: x / Urobili: x   Blood: x / Protein: x / Nitrite: x   Leuk Esterase: x / RBC: x / WBC x   Sq Epi: x / Non Sq Epi: x / Bacteria: x                                                  LIVER FUNCTIONS - ( 29 Aug 2024 15:15 )  Alb: 4.6 g/dL / Pro: 6.9 g/dL / ALK PHOS: 123 U/L / ALT: 25 U/L / AST: 30 U/L / GGT: x                                                                                                                                         MEDICATIONS  (STANDING):  albuterol    90 MICROgram(s) HFA Inhaler 2 Puff(s) Inhalation four times a day  albuterol/ipratropium for Nebulization 3 milliLiter(s) Nebulizer every 6 hours  amLODIPine   Tablet 5 milliGRAM(s) Oral daily  atorvastatin 10 milliGRAM(s) Oral at bedtime  azithromycin   Tablet 500 milliGRAM(s) Oral daily  budesonide 160 MICROgram(s)/formoterol 4.5 MICROgram(s) Inhaler 2 Puff(s) Inhalation two times a day  dextrose 5%. 1000 milliLiter(s) (50 mL/Hr) IV Continuous <Continuous>  dextrose 5%. 1000 milliLiter(s) (100 mL/Hr) IV Continuous <Continuous>  dextrose 50% Injectable 25 Gram(s) IV Push once  dextrose 50% Injectable 12.5 Gram(s) IV Push once  dextrose 50% Injectable 25 Gram(s) IV Push once  glucagon  Injectable 1 milliGRAM(s) IntraMuscular once  heparin   Injectable 5000 Unit(s) SubCutaneous every 12 hours  insulin lispro (ADMELOG) corrective regimen sliding scale   SubCutaneous three times a day before meals  ipratropium    for Nebulization 500 MICROGram(s) Nebulizer every 6 hours  methylPREDNISolone sodium succinate Injectable 40 milliGRAM(s) IV Push two times a day    MEDICATIONS  (PRN):  dextrose Oral Gel 15 Gram(s) Oral once PRN Blood Glucose LESS THAN 70 milliGRAM(s)/deciliter        CXR noted

## 2024-08-30 NOTE — PROGRESS NOTE ADULT - SUBJECTIVE AND OBJECTIVE BOX
24H events:    Patient is a 78y old Female who presents with a chief complaint of   Primary diagnosis of COPD exacerbation        Today is hospital day 1d. This morning patient was seen and examined at bedside, resting comfortably in bed.    No acute or major events overnight.    Code Status:    PAST MEDICAL & SURGICAL HISTORY  CVA (cerebral vascular accident)    Anxiety    HTN (hypertension)    COPD (chronic obstructive pulmonary disease)    S/P total abdominal hysterectomy      SOCIAL HISTORY:  Social History:      ALLERGIES:  No Known Allergies    MEDICATIONS:  STANDING MEDICATIONS  albuterol    90 MICROgram(s) HFA Inhaler 2 Puff(s) Inhalation four times a day  albuterol/ipratropium for Nebulization 3 milliLiter(s) Nebulizer every 6 hours  amLODIPine   Tablet 5 milliGRAM(s) Oral daily  atorvastatin 10 milliGRAM(s) Oral at bedtime  azithromycin   Tablet 500 milliGRAM(s) Oral daily  budesonide 160 MICROgram(s)/formoterol 4.5 MICROgram(s) Inhaler 2 Puff(s) Inhalation two times a day  chlorhexidine 2% Cloths 1 Application(s) Topical <User Schedule>  dextrose 5%. 1000 milliLiter(s) IV Continuous <Continuous>  dextrose 5%. 1000 milliLiter(s) IV Continuous <Continuous>  dextrose 50% Injectable 25 Gram(s) IV Push once  dextrose 50% Injectable 12.5 Gram(s) IV Push once  dextrose 50% Injectable 25 Gram(s) IV Push once  glucagon  Injectable 1 milliGRAM(s) IntraMuscular once  heparin   Injectable 5000 Unit(s) SubCutaneous every 12 hours  insulin lispro (ADMELOG) corrective regimen sliding scale   SubCutaneous three times a day before meals  ipratropium    for Nebulization 500 MICROGram(s) Nebulizer every 6 hours  methylPREDNISolone sodium succinate Injectable 60 milliGRAM(s) IV Push every 8 hours    PRN MEDICATIONS  dextrose Oral Gel 15 Gram(s) Oral once PRN    VITALS:   T(F): 97.1  HR: 79  BP: 134/63  RR: 18  SpO2: 96%    PHYSICAL EXAM:  GENERAL: NAD, lying in bed comfortably  HEAD: atraumatic, normocephalic  NECK: supple  HEART: regular rhythm  LUNGS: b/l air entry. Wheezing heard b/l.  ABDOMEN: BS (+), soft, nontender, nondistended  NERVOUS SYSTEM: A&O x 3  EXTREMITIES: No pedal edema b/l      LABS:                        15.1   4.08  )-----------( 160      ( 30 Aug 2024 05:32 )             47.7     08-30    140  |  99  |  22<H>  ----------------------------<  230<H>  5.1<H>   |  29  |  0.8    Ca    9.0      30 Aug 2024 05:32  Phos  5.2     08  Mg     2.4         TPro  6.3  /  Alb  4.1  /  TBili  0.5  /  DBili  x   /  AST  22  /  ALT  21  /  AlkPhos  107  08-30    PT/INR - ( 30 Aug 2024 05:32 )   PT: 13.90 sec;   INR: 1.22 ratio         PTT - ( 30 Aug 2024 05:32 )  PTT:35.5 sec  Urinalysis Basic - ( 30 Aug 2024 05:32 )    Color: x / Appearance: x / SG: x / pH: x  Gluc: 230 mg/dL / Ketone: x  / Bili: x / Urobili: x   Blood: x / Protein: x / Nitrite: x   Leuk Esterase: x / RBC: x / WBC x   Sq Epi: x / Non Sq Epi: x / Bacteria: x                RADIOLOGY:    < from: Xray Chest 1 View-PORTABLE IMMEDIATE (24 @ 16:02) >  Impression:    No acute infiltrates.    < end of copied text >      ASSESSMENT/PLAN:     78 year old female with PMH of COPD on home O2, current smoker, HTN, HLD, CAD s/p stents 2019, h/o dysphagia & zenker diverticulum presenting for SOB. Of note patient recently discharged in 2024 for COPD. Admitted to SDU for further management of Acute Hypercapnic Respiratory failure 2/2 COPD exacerbation     #Acute Hypercapnic Respiratory Failure 2/2 COPD Exacerbation  - VBG: PCO2 88 --> 81 ()  - CXR: hyperinflatation without consolidation   -s/p solumedrol and duonebs  - Procal, MRSA, Legionella, strep pending  - BiPAP at night  - c/w solumedrol 60 mg IV q8h  - c/w duonebs, symbicort, spiriva  - c/w azithromycin x 5 days    #HTN  #HLD  #CAD s/p stents  - c/w home meds    #H/o Dysphagia & Zenker  - SLP consulted- cleared for soft bite sized diet                                             --------------------------------------------------------------    # DVT prophylaxis: Heparin  # GI prophylaxis: PPI  # Diet: Soft-bite sized   # Activity: IAT  # Code status: Full Code  # Disposition:    Pendin) Procal, MRSA, legionella, strep 24H events:    Patient is a 78y old Female who presents with a chief complaint of   Primary diagnosis of COPD exacerbation        Today is hospital day 1d. This morning patient was seen and examined at bedside, resting comfortably in bed.    No acute or major events overnight.    Code Status:    PAST MEDICAL & SURGICAL HISTORY  CVA (cerebral vascular accident)    Anxiety    HTN (hypertension)    COPD (chronic obstructive pulmonary disease)    S/P total abdominal hysterectomy      SOCIAL HISTORY:  Social History:      ALLERGIES:  No Known Allergies    MEDICATIONS:  STANDING MEDICATIONS  albuterol    90 MICROgram(s) HFA Inhaler 2 Puff(s) Inhalation four times a day  albuterol/ipratropium for Nebulization 3 milliLiter(s) Nebulizer every 6 hours  amLODIPine   Tablet 5 milliGRAM(s) Oral daily  atorvastatin 10 milliGRAM(s) Oral at bedtime  azithromycin   Tablet 500 milliGRAM(s) Oral daily  budesonide 160 MICROgram(s)/formoterol 4.5 MICROgram(s) Inhaler 2 Puff(s) Inhalation two times a day  chlorhexidine 2% Cloths 1 Application(s) Topical <User Schedule>  dextrose 5%. 1000 milliLiter(s) IV Continuous <Continuous>  dextrose 5%. 1000 milliLiter(s) IV Continuous <Continuous>  dextrose 50% Injectable 25 Gram(s) IV Push once  dextrose 50% Injectable 12.5 Gram(s) IV Push once  dextrose 50% Injectable 25 Gram(s) IV Push once  glucagon  Injectable 1 milliGRAM(s) IntraMuscular once  heparin   Injectable 5000 Unit(s) SubCutaneous every 12 hours  insulin lispro (ADMELOG) corrective regimen sliding scale   SubCutaneous three times a day before meals  ipratropium    for Nebulization 500 MICROGram(s) Nebulizer every 6 hours  methylPREDNISolone sodium succinate Injectable 60 milliGRAM(s) IV Push every 8 hours    PRN MEDICATIONS  dextrose Oral Gel 15 Gram(s) Oral once PRN    VITALS:   T(F): 97.1  HR: 79  BP: 134/63  RR: 18  SpO2: 96%    PHYSICAL EXAM:  GENERAL: NAD, lying in bed comfortably  HEAD: atraumatic, normocephalic  NECK: supple  HEART: regular rhythm  LUNGS: b/l air entry. Wheezing heard b/l.  ABDOMEN: BS (+), soft, nontender, nondistended  NERVOUS SYSTEM: A&O x 3  EXTREMITIES: No pedal edema b/l      LABS:                        15.1   4.08  )-----------( 160      ( 30 Aug 2024 05:32 )             47.7     08-30    140  |  99  |  22<H>  ----------------------------<  230<H>  5.1<H>   |  29  |  0.8    Ca    9.0      30 Aug 2024 05:32  Phos  5.2     08  Mg     2.4         TPro  6.3  /  Alb  4.1  /  TBili  0.5  /  DBili  x   /  AST  22  /  ALT  21  /  AlkPhos  107  08-30    PT/INR - ( 30 Aug 2024 05:32 )   PT: 13.90 sec;   INR: 1.22 ratio         PTT - ( 30 Aug 2024 05:32 )  PTT:35.5 sec  Urinalysis Basic - ( 30 Aug 2024 05:32 )    Color: x / Appearance: x / SG: x / pH: x  Gluc: 230 mg/dL / Ketone: x  / Bili: x / Urobili: x   Blood: x / Protein: x / Nitrite: x   Leuk Esterase: x / RBC: x / WBC x   Sq Epi: x / Non Sq Epi: x / Bacteria: x                RADIOLOGY:    < from: Xray Chest 1 View-PORTABLE IMMEDIATE (24 @ 16:02) >  Impression:    No acute infiltrates.    < end of copied text >      ASSESSMENT/PLAN:     78 year old female with PMH of COPD on home O2, current smoker, HTN, HLD, CAD s/p stents 2019, h/o dysphagia & zenker diverticulum presenting for SOB. Of note patient recently discharged in 2024 for COPD. Admitted to SDU for further management of Acute Hypercapnic Respiratory failure 2/2 COPD exacerbation     #Acute Hypercapnic Respiratory Failure 2/2 COPD Exacerbation  - VBG: PCO2 88 --> 81 ()  - CXR: hyperinflatation without consolidation   -s/p solumedrol and duonebs  - Procal, MRSA, Legionella, strep pending  - BiPAP at night  - c/w solumedrol 60 mg IV q8h  - c/w duonebs, symbicort, spiriva  - c/w azithromycin x 5 days    #HTN  #HLD  #CAD s/p stents  - c/w home meds    #H/o Dysphagia & Zenker  - SLP consulted- cleared for soft bite sized diet    #DM  - A1c 6.7  - monitor FS  - c/w ISS                                           --------------------------------------------------------------    # DVT prophylaxis: Heparin  # GI prophylaxis: PPI  # Diet: Soft-bite sized   # Activity: IAT  # Code status: Full Code  # Disposition:    Pendin) Procal, MRSA, legionella, strep

## 2024-08-30 NOTE — SWALLOW BEDSIDE ASSESSMENT ADULT - SLP PERTINENT HISTORY OF CURRENT PROBLEM
78-year-old female past medical history of COPD not on home O2, current smoker, hypertension, hyperlipidemia, CAD status post stent 2019, hx of dysphagia and zenker diverticulum present for shortness of breath. to note that the patient was recently discharged in june for COPD exacerbation. history goes back to yesterday when the patient started having dizziness and shorntess of breath,. during my examination i removed the BiPAP to speak and see how she would tolerate  off BiPAP the patient desaturated over a duration of 2 mintues to low 80s, BiPAP was placed back on and the patient's saturation got better. Patient denies any CP, headache, urinary symptoms. I spoke to the son and he confirmed that the patient is not very compliant with her medications especially her nebulizers. No fevers or chills reported. On examination patient is significantly wheezing. PAtient is admitted for COPD excacerbation ;

## 2024-08-30 NOTE — SWALLOW BEDSIDE ASSESSMENT ADULT - SWALLOW EVAL: DIAGNOSIS
Toleration of puree, soft & bite sized, and regular with thin liquids w/o overt s/s of penetration/aspiration.

## 2024-08-31 DIAGNOSIS — I10 ESSENTIAL (PRIMARY) HYPERTENSION: ICD-10-CM

## 2024-08-31 DIAGNOSIS — Z79.899 OTHER LONG TERM (CURRENT) DRUG THERAPY: ICD-10-CM

## 2024-08-31 DIAGNOSIS — K22.5 DIVERTICULUM OF ESOPHAGUS, ACQUIRED: ICD-10-CM

## 2024-08-31 DIAGNOSIS — J96.21 ACUTE AND CHRONIC RESPIRATORY FAILURE WITH HYPOXIA: ICD-10-CM

## 2024-08-31 DIAGNOSIS — I25.10 ATHEROSCLEROTIC HEART DISEASE OF NATIVE CORONARY ARTERY WITHOUT ANGINA PECTORIS: ICD-10-CM

## 2024-08-31 LAB
ANION GAP SERPL CALC-SCNC: 8 MMOL/L — SIGNIFICANT CHANGE UP (ref 7–14)
BASOPHILS # BLD AUTO: 0.01 K/UL — SIGNIFICANT CHANGE UP (ref 0–0.2)
BASOPHILS NFR BLD AUTO: 0.1 % — SIGNIFICANT CHANGE UP (ref 0–1)
BUN SERPL-MCNC: 30 MG/DL — HIGH (ref 10–20)
CALCIUM SERPL-MCNC: 9.4 MG/DL — SIGNIFICANT CHANGE UP (ref 8.4–10.5)
CHLORIDE SERPL-SCNC: 102 MMOL/L — SIGNIFICANT CHANGE UP (ref 98–110)
CO2 SERPL-SCNC: 32 MMOL/L — SIGNIFICANT CHANGE UP (ref 17–32)
CREAT SERPL-MCNC: 0.6 MG/DL — LOW (ref 0.7–1.5)
D DIMER BLD IA.RAPID-MCNC: 281 NG/ML DDU — HIGH
EGFR: 91 ML/MIN/1.73M2 — SIGNIFICANT CHANGE UP
EOSINOPHIL # BLD AUTO: 0 K/UL — SIGNIFICANT CHANGE UP (ref 0–0.7)
EOSINOPHIL NFR BLD AUTO: 0 % — SIGNIFICANT CHANGE UP (ref 0–8)
GLUCOSE BLDC GLUCOMTR-MCNC: 133 MG/DL — HIGH (ref 70–99)
GLUCOSE BLDC GLUCOMTR-MCNC: 183 MG/DL — HIGH (ref 70–99)
GLUCOSE BLDC GLUCOMTR-MCNC: 199 MG/DL — HIGH (ref 70–99)
GLUCOSE BLDC GLUCOMTR-MCNC: 235 MG/DL — HIGH (ref 70–99)
GLUCOSE SERPL-MCNC: 160 MG/DL — HIGH (ref 70–99)
HCT VFR BLD CALC: 45.7 % — SIGNIFICANT CHANGE UP (ref 37–47)
HGB BLD-MCNC: 14.4 G/DL — SIGNIFICANT CHANGE UP (ref 12–16)
HPIV4 RNA SPEC QL NAA+PROBE: DETECTED
IMM GRANULOCYTES NFR BLD AUTO: 0.4 % — HIGH (ref 0.1–0.3)
LYMPHOCYTES # BLD AUTO: 0.82 K/UL — LOW (ref 1.2–3.4)
LYMPHOCYTES # BLD AUTO: 5.8 % — LOW (ref 20.5–51.1)
MAGNESIUM SERPL-MCNC: 2.3 MG/DL — SIGNIFICANT CHANGE UP (ref 1.8–2.4)
MCHC RBC-ENTMCNC: 30.4 PG — SIGNIFICANT CHANGE UP (ref 27–31)
MCHC RBC-ENTMCNC: 31.5 G/DL — LOW (ref 32–37)
MCV RBC AUTO: 96.6 FL — SIGNIFICANT CHANGE UP (ref 81–99)
MONOCYTES # BLD AUTO: 0.49 K/UL — SIGNIFICANT CHANGE UP (ref 0.1–0.6)
MONOCYTES NFR BLD AUTO: 3.5 % — SIGNIFICANT CHANGE UP (ref 1.7–9.3)
NEUTROPHILS # BLD AUTO: 12.75 K/UL — HIGH (ref 1.4–6.5)
NEUTROPHILS NFR BLD AUTO: 90.2 % — HIGH (ref 42.2–75.2)
NRBC # BLD: 0 /100 WBCS — SIGNIFICANT CHANGE UP (ref 0–0)
PLATELET # BLD AUTO: 157 K/UL — SIGNIFICANT CHANGE UP (ref 130–400)
PMV BLD: 10.9 FL — HIGH (ref 7.4–10.4)
POTASSIUM SERPL-MCNC: 4.8 MMOL/L — SIGNIFICANT CHANGE UP (ref 3.5–5)
POTASSIUM SERPL-SCNC: 4.8 MMOL/L — SIGNIFICANT CHANGE UP (ref 3.5–5)
RAPID RVP RESULT: DETECTED
RBC # BLD: 4.73 M/UL — SIGNIFICANT CHANGE UP (ref 4.2–5.4)
RBC # FLD: 13.1 % — SIGNIFICANT CHANGE UP (ref 11.5–14.5)
SARS-COV-2 RNA SPEC QL NAA+PROBE: SIGNIFICANT CHANGE UP
SODIUM SERPL-SCNC: 142 MMOL/L — SIGNIFICANT CHANGE UP (ref 135–146)
WBC # BLD: 14.13 K/UL — HIGH (ref 4.8–10.8)
WBC # FLD AUTO: 14.13 K/UL — HIGH (ref 4.8–10.8)

## 2024-08-31 PROCEDURE — 71045 X-RAY EXAM CHEST 1 VIEW: CPT | Mod: 26

## 2024-08-31 PROCEDURE — 99291 CRITICAL CARE FIRST HOUR: CPT

## 2024-08-31 PROCEDURE — 99233 SBSQ HOSP IP/OBS HIGH 50: CPT

## 2024-08-31 RX ORDER — METHYLPREDNISOLONE 4 MG
60 TABLET ORAL EVERY 12 HOURS
Refills: 0 | Status: DISCONTINUED | OUTPATIENT
Start: 2024-08-31 | End: 2024-09-01

## 2024-08-31 RX ADMIN — Medication 2: at 08:51

## 2024-08-31 RX ADMIN — Medication 5 MILLIGRAM(S): at 21:58

## 2024-08-31 RX ADMIN — CHLORHEXIDINE GLUCONATE 1 APPLICATION(S): 40 SOLUTION TOPICAL at 06:21

## 2024-08-31 RX ADMIN — Medication 10 MILLIGRAM(S): at 21:58

## 2024-08-31 RX ADMIN — Medication 2: at 11:42

## 2024-08-31 RX ADMIN — Medication 0.5 MILLIGRAM(S): at 21:58

## 2024-08-31 RX ADMIN — AMLODIPINE BESYLATE 5 MILLIGRAM(S): 10 TABLET ORAL at 06:20

## 2024-08-31 RX ADMIN — SODIUM ZIRCONIUM CYCLOSILICATE 5 GRAM(S): 5 POWDER, FOR SUSPENSION ORAL at 06:20

## 2024-08-31 RX ADMIN — Medication 60 MILLIGRAM(S): at 17:02

## 2024-08-31 RX ADMIN — Medication 5000 UNIT(S): at 17:02

## 2024-08-31 RX ADMIN — Medication 60 MILLIGRAM(S): at 06:20

## 2024-08-31 RX ADMIN — Medication 5000 UNIT(S): at 06:20

## 2024-08-31 RX ADMIN — AZITHROMYCIN 500 MILLIGRAM(S): 500 TABLET, FILM COATED ORAL at 11:42

## 2024-08-31 RX ADMIN — INSULIN GLARGINE 10 UNIT(S): 100 INJECTION, SOLUTION SUBCUTANEOUS at 22:12

## 2024-08-31 NOTE — PROGRESS NOTE ADULT - SUBJECTIVE AND OBJECTIVE BOX
Patient is a 78y old  Female who presents with a chief complaint of       Over Night Events: On NC.           ROS:     All ROS are negative except HPI         PHYSICAL EXAM    ICU Vital Signs Last 24 Hrs  T(C): 36.3 (31 Aug 2024 01:26), Max: 36.3 (30 Aug 2024 20:00)  T(F): 97.4 (31 Aug 2024 01:26), Max: 97.4 (30 Aug 2024 20:00)  HR: 75 (31 Aug 2024 05:35) (69 - 104)  BP: 117/56 (31 Aug 2024 05:35) (117/56 - 138/62)  BP(mean): 81 (31 Aug 2024 05:35) (81 - 90)  ABP: --  ABP(mean): --  RR: 18 (30 Aug 2024 18:22) (18 - 18)  SpO2: 100% (31 Aug 2024 05:35) (87% - 100%)    O2 Parameters below as of 31 Aug 2024 05:35  Patient On (Oxygen Delivery Method): nasal cannula  O2 Flow (L/min): 2          CONSTITUTIONAL:  NAD    ENT:   Airway patent,   Mouth with normal mucosa.     EYES:   Pupils equal,   Round and reactive to light.    CARDIAC:   Normal rate,   Regular rhythm.        RESPIRATORY:   Faint exp wheezing  Bilateral BS  Normal chest expansion  Not tachypneic,  No use of accessory muscles    GASTROINTESTINAL:  Abdomen soft,   Non-tender,   No guarding,   + BS    MUSCULOSKELETAL:   No clubbing, cyanosis    NEUROLOGICAL:   Alert   No motor  deficits.    SKIN:   Skin normal color for race,   No evidence of rash.          LABS:                            14.4   14.13 )-----------( 157      ( 31 Aug 2024 05:00 )             45.7                                               08-31    142  |  102  |  30<H>  ----------------------------<  160<H>  4.8   |  32  |  0.6<L>    Ca    9.4      31 Aug 2024 05:00  Phos  5.2     08-30  Mg     2.3     08-31    TPro  6.3  /  Alb  4.1  /  TBili  0.5  /  DBili  x   /  AST  22  /  ALT  21  /  AlkPhos  107  08-30      PT/INR - ( 30 Aug 2024 05:32 )   PT: 13.90 sec;   INR: 1.22 ratio         PTT - ( 30 Aug 2024 05:32 )  PTT:35.5 sec                                       Urinalysis Basic - ( 31 Aug 2024 05:00 )    Color: x / Appearance: x / SG: x / pH: x  Gluc: 160 mg/dL / Ketone: x  / Bili: x / Urobili: x   Blood: x / Protein: x / Nitrite: x   Leuk Esterase: x / RBC: x / WBC x   Sq Epi: x / Non Sq Epi: x / Bacteria: x                                                  LIVER FUNCTIONS - ( 30 Aug 2024 05:32 )  Alb: 4.1 g/dL / Pro: 6.3 g/dL / ALK PHOS: 107 U/L / ALT: 21 U/L / AST: 22 U/L / GGT: x                                                                                                                                   ABG - ( 30 Aug 2024 16:41 )  pH, Arterial: 7.33  pH, Blood: x     /  pCO2: 60    /  pO2: 127   / HCO3: 32    / Base Excess: 3.7   /  SaO2: 99.6                MEDICATIONS  (STANDING):  albuterol    90 MICROgram(s) HFA Inhaler 2 Puff(s) Inhalation four times a day  albuterol/ipratropium for Nebulization 3 milliLiter(s) Nebulizer every 6 hours  ALPRAZolam 0.5 milliGRAM(s) Oral at bedtime  amLODIPine   Tablet 5 milliGRAM(s) Oral daily  atorvastatin 10 milliGRAM(s) Oral at bedtime  azithromycin   Tablet 500 milliGRAM(s) Oral daily  budesonide 160 MICROgram(s)/formoterol 4.5 MICROgram(s) Inhaler 2 Puff(s) Inhalation two times a day  chlorhexidine 2% Cloths 1 Application(s) Topical <User Schedule>  dextrose 5%. 1000 milliLiter(s) (100 mL/Hr) IV Continuous <Continuous>  dextrose 5%. 1000 milliLiter(s) (50 mL/Hr) IV Continuous <Continuous>  dextrose 50% Injectable 25 Gram(s) IV Push once  dextrose 50% Injectable 12.5 Gram(s) IV Push once  dextrose 50% Injectable 25 Gram(s) IV Push once  glucagon  Injectable 1 milliGRAM(s) IntraMuscular once  heparin   Injectable 5000 Unit(s) SubCutaneous every 12 hours  insulin glargine Injectable (LANTUS) 10 Unit(s) SubCutaneous at bedtime  insulin lispro (ADMELOG) corrective regimen sliding scale   SubCutaneous three times a day before meals  ipratropium    for Nebulization 500 MICROGram(s) Nebulizer every 6 hours  methylPREDNISolone sodium succinate Injectable 60 milliGRAM(s) IV Push every 8 hours    MEDICATIONS  (PRN):  dextrose Oral Gel 15 Gram(s) Oral once PRN Blood Glucose LESS THAN 70 milliGRAM(s)/deciliter      New X-rays reviewed:                                                                                  ECHO

## 2024-08-31 NOTE — PROGRESS NOTE ADULT - SUBJECTIVE AND OBJECTIVE BOX
INTERVAL HPI/OVERNIGHT EVENTS:    SUBJECTIVE: Patient seen and examined at bedside.     no cp,  abd pain, fever  sob improved, no cough, orthopnea, pnd    OBJECTIVE:    VITAL SIGNS:  Vital Signs Last 24 Hrs  T(C): 36.1 (31 Aug 2024 08:00), Max: 36.3 (30 Aug 2024 20:00)  T(F): 97 (31 Aug 2024 08:00), Max: 97.4 (30 Aug 2024 20:00)  HR: 79 (31 Aug 2024 08:00) (75 - 96)  BP: 123/63 (31 Aug 2024 08:00) (117/56 - 127/60)  BP(mean): 87 (31 Aug 2024 08:00) (81 - 87)  RR: 18 (31 Aug 2024 08:00) (18 - 18)  SpO2: 96% (31 Aug 2024 08:00) (93% - 100%)    Parameters below as of 31 Aug 2024 08:00  Patient On (Oxygen Delivery Method): nasal cannula  O2 Flow (L/min): 2        PHYSICAL EXAM:    General: NAD  HEENT: NC/AT; PERRL, clear conjunctiva  Neck: supple  Respiratory: CTA b/l  Cardiovascular: +S1/S2; RRR  Abdomen: soft, NT/ND; +BS x4  Extremities: WWP, 2+ peripheral pulses b/l; no LE edema  Skin: normal color and turgor; no rash  Neurological:    MEDICATIONS:  MEDICATIONS  (STANDING):  albuterol    90 MICROgram(s) HFA Inhaler 2 Puff(s) Inhalation four times a day  albuterol/ipratropium for Nebulization 3 milliLiter(s) Nebulizer every 6 hours  ALPRAZolam 0.5 milliGRAM(s) Oral at bedtime  amLODIPine   Tablet 5 milliGRAM(s) Oral daily  atorvastatin 10 milliGRAM(s) Oral at bedtime  azithromycin   Tablet 500 milliGRAM(s) Oral daily  budesonide 160 MICROgram(s)/formoterol 4.5 MICROgram(s) Inhaler 2 Puff(s) Inhalation two times a day  chlorhexidine 2% Cloths 1 Application(s) Topical <User Schedule>  dextrose 5%. 1000 milliLiter(s) (50 mL/Hr) IV Continuous <Continuous>  dextrose 5%. 1000 milliLiter(s) (100 mL/Hr) IV Continuous <Continuous>  dextrose 50% Injectable 25 Gram(s) IV Push once  dextrose 50% Injectable 12.5 Gram(s) IV Push once  dextrose 50% Injectable 25 Gram(s) IV Push once  glucagon  Injectable 1 milliGRAM(s) IntraMuscular once  heparin   Injectable 5000 Unit(s) SubCutaneous every 12 hours  insulin glargine Injectable (LANTUS) 10 Unit(s) SubCutaneous at bedtime  insulin lispro (ADMELOG) corrective regimen sliding scale   SubCutaneous three times a day before meals  ipratropium    for Nebulization 500 MICROGram(s) Nebulizer every 6 hours  methylPREDNISolone sodium succinate Injectable 60 milliGRAM(s) IV Push every 12 hours    MEDICATIONS  (PRN):  dextrose Oral Gel 15 Gram(s) Oral once PRN Blood Glucose LESS THAN 70 milliGRAM(s)/deciliter      ALLERGIES:  Allergies    No Known Allergies    Intolerances        LABS:                        14.4   14.13 )-----------( 157      ( 31 Aug 2024 05:00 )             45.7     Hemoglobin: 14.4 g/dL (08-31 @ 05:00)  Hemoglobin: 15.1 g/dL (08-30 @ 05:32)  Hemoglobin: 16.0 g/dL (08-29 @ 15:15)    CBC Full  -  ( 31 Aug 2024 05:00 )  WBC Count : 14.13 K/uL  RBC Count : 4.73 M/uL  Hemoglobin : 14.4 g/dL  Hematocrit : 45.7 %  Platelet Count - Automated : 157 K/uL  Mean Cell Volume : 96.6 fL  Mean Cell Hemoglobin : 30.4 pg  Mean Cell Hemoglobin Concentration : 31.5 g/dL  Auto Neutrophil # : 12.75 K/uL  Auto Lymphocyte # : 0.82 K/uL  Auto Monocyte # : 0.49 K/uL  Auto Eosinophil # : 0.00 K/uL  Auto Basophil # : 0.01 K/uL  Auto Neutrophil % : 90.2 %  Auto Lymphocyte % : 5.8 %  Auto Monocyte % : 3.5 %  Auto Eosinophil % : 0.0 %  Auto Basophil % : 0.1 %    08-31    142  |  102  |  30<H>  ----------------------------<  160<H>  4.8   |  32  |  0.6<L>    Ca    9.4      31 Aug 2024 05:00  Phos  5.2     08-30  Mg     2.3     08-31    TPro  6.3  /  Alb  4.1  /  TBili  0.5  /  DBili  x   /  AST  22  /  ALT  21  /  AlkPhos  107  08-30    Creatinine Trend: 0.6<--, 0.8<--, 0.7<--  LIVER FUNCTIONS - ( 30 Aug 2024 05:32 )  Alb: 4.1 g/dL / Pro: 6.3 g/dL / ALK PHOS: 107 U/L / ALT: 21 U/L / AST: 22 U/L / GGT: x           PT/INR - ( 30 Aug 2024 05:32 )   PT: 13.90 sec;   INR: 1.22 ratio         PTT - ( 30 Aug 2024 05:32 )  PTT:35.5 sec    hs Troponin:                10 <<== 08-30-24 @ 05:32                14 <<== 08-29-24 @ 15:15    ABG - ( 30 Aug 2024 16:41 )  pH, Arterial: 7.33  pH, Blood: x     /  pCO2: 60    /  pO2: 127   / HCO3: 32    / Base Excess: 3.7   /  SaO2: 99.6                16:41 - ABG - pH: 7.33  |  pCO2: 60    |  pO2: 127   | Lactate:       | BE: 3.7        Urinalysis Basic - ( 31 Aug 2024 05:00 )    Color: x / Appearance: x / SG: x / pH: x  Gluc: 160 mg/dL / Ketone: x  / Bili: x / Urobili: x   Blood: x / Protein: x / Nitrite: x   Leuk Esterase: x / RBC: x / WBC x   Sq Epi: x / Non Sq Epi: x / Bacteria: x      CSF:                      EKG:   MICROBIOLOGY:    IMAGING:      Labs, imaging, EKG personally reviewed    RADIOLOGY & ADDITIONAL TESTS: Reviewed.

## 2024-08-31 NOTE — PHYSICAL THERAPY INITIAL EVALUATION ADULT - PERTINENT HX OF CURRENT PROBLEM, REHAB EVAL
78 year old female with PMH of COPD on home O2, current smoker, HTN, HLD, CAD s/p stents 2019, h/o dysphagia & zenker diverticulum presenting for SOB. Of note patient recently discharged in June 2024 for COPD. Admitted to SDU for further management of Acute Hypercapnic Respiratory failure 2/2 COPD exacerbation  Acute Hypercapnic Respiratory Failure 2/2 COPD Exacerbation

## 2024-08-31 NOTE — PHYSICAL THERAPY INITIAL EVALUATION ADULT - GAIT TRAINING, PT EVAL
Increase amb to 100ft supervision with RW by d/c                               Stairs: 10 steps CGA with 1HR by d/c

## 2024-08-31 NOTE — PHYSICAL THERAPY INITIAL EVALUATION ADULT - GENERAL OBSERVATIONS, REHAB EVAL
Pt seen from [id number]. Pt encountered in the bed, + pulse oxi, BP cuff, tele, O2 @ 2l/min via NC, SPO2 96%, agreeable for b/s PT.

## 2024-09-01 DIAGNOSIS — E87.5 HYPERKALEMIA: ICD-10-CM

## 2024-09-01 LAB
ANION GAP SERPL CALC-SCNC: 4 MMOL/L — LOW (ref 7–14)
BASOPHILS # BLD AUTO: 0.03 K/UL — SIGNIFICANT CHANGE UP (ref 0–0.2)
BASOPHILS NFR BLD AUTO: 0.2 % — SIGNIFICANT CHANGE UP (ref 0–1)
BUN SERPL-MCNC: 25 MG/DL — HIGH (ref 10–20)
CALCIUM SERPL-MCNC: 9.4 MG/DL — SIGNIFICANT CHANGE UP (ref 8.4–10.5)
CHLORIDE SERPL-SCNC: 102 MMOL/L — SIGNIFICANT CHANGE UP (ref 98–110)
CO2 SERPL-SCNC: 36 MMOL/L — HIGH (ref 17–32)
CREAT SERPL-MCNC: 0.6 MG/DL — LOW (ref 0.7–1.5)
EGFR: 91 ML/MIN/1.73M2 — SIGNIFICANT CHANGE UP
EOSINOPHIL # BLD AUTO: 0 K/UL — SIGNIFICANT CHANGE UP (ref 0–0.7)
EOSINOPHIL NFR BLD AUTO: 0 % — SIGNIFICANT CHANGE UP (ref 0–8)
GLUCOSE BLDC GLUCOMTR-MCNC: 143 MG/DL — HIGH (ref 70–99)
GLUCOSE BLDC GLUCOMTR-MCNC: 148 MG/DL — HIGH (ref 70–99)
GLUCOSE BLDC GLUCOMTR-MCNC: 161 MG/DL — HIGH (ref 70–99)
GLUCOSE BLDC GLUCOMTR-MCNC: 99 MG/DL — SIGNIFICANT CHANGE UP (ref 70–99)
GLUCOSE SERPL-MCNC: 148 MG/DL — HIGH (ref 70–99)
HCT VFR BLD CALC: 48 % — HIGH (ref 37–47)
HGB BLD-MCNC: 14.7 G/DL — SIGNIFICANT CHANGE UP (ref 12–16)
IMM GRANULOCYTES NFR BLD AUTO: 0.6 % — HIGH (ref 0.1–0.3)
LYMPHOCYTES # BLD AUTO: 0.89 K/UL — LOW (ref 1.2–3.4)
LYMPHOCYTES # BLD AUTO: 6.2 % — LOW (ref 20.5–51.1)
MAGNESIUM SERPL-MCNC: 2.3 MG/DL — SIGNIFICANT CHANGE UP (ref 1.8–2.4)
MCHC RBC-ENTMCNC: 30.2 PG — SIGNIFICANT CHANGE UP (ref 27–31)
MCHC RBC-ENTMCNC: 30.6 G/DL — LOW (ref 32–37)
MCV RBC AUTO: 98.6 FL — SIGNIFICANT CHANGE UP (ref 81–99)
MONOCYTES # BLD AUTO: 0.52 K/UL — SIGNIFICANT CHANGE UP (ref 0.1–0.6)
MONOCYTES NFR BLD AUTO: 3.6 % — SIGNIFICANT CHANGE UP (ref 1.7–9.3)
NEUTROPHILS # BLD AUTO: 12.81 K/UL — HIGH (ref 1.4–6.5)
NEUTROPHILS NFR BLD AUTO: 89.4 % — HIGH (ref 42.2–75.2)
NRBC # BLD: 0 /100 WBCS — SIGNIFICANT CHANGE UP (ref 0–0)
PLATELET # BLD AUTO: 168 K/UL — SIGNIFICANT CHANGE UP (ref 130–400)
PMV BLD: 11.1 FL — HIGH (ref 7.4–10.4)
POTASSIUM SERPL-MCNC: 5.1 MMOL/L — HIGH (ref 3.5–5)
POTASSIUM SERPL-SCNC: 5.1 MMOL/L — HIGH (ref 3.5–5)
RBC # BLD: 4.87 M/UL — SIGNIFICANT CHANGE UP (ref 4.2–5.4)
RBC # FLD: 13.2 % — SIGNIFICANT CHANGE UP (ref 11.5–14.5)
SODIUM SERPL-SCNC: 142 MMOL/L — SIGNIFICANT CHANGE UP (ref 135–146)
WBC # BLD: 14.33 K/UL — HIGH (ref 4.8–10.8)
WBC # FLD AUTO: 14.33 K/UL — HIGH (ref 4.8–10.8)

## 2024-09-01 PROCEDURE — 99233 SBSQ HOSP IP/OBS HIGH 50: CPT

## 2024-09-01 RX ORDER — SODIUM ZIRCONIUM CYCLOSILICATE 5 G/5G
5 POWDER, FOR SUSPENSION ORAL
Refills: 0 | Status: COMPLETED | OUTPATIENT
Start: 2024-09-01 | End: 2024-09-02

## 2024-09-01 RX ORDER — METHYLPREDNISOLONE 4 MG
60 TABLET ORAL EVERY 24 HOURS
Refills: 0 | Status: DISCONTINUED | OUTPATIENT
Start: 2024-09-01 | End: 2024-09-03

## 2024-09-01 RX ADMIN — CHLORHEXIDINE GLUCONATE 1 APPLICATION(S): 40 SOLUTION TOPICAL at 06:02

## 2024-09-01 RX ADMIN — Medication 0.5 MILLIGRAM(S): at 21:52

## 2024-09-01 RX ADMIN — Medication 10 MILLIGRAM(S): at 21:51

## 2024-09-01 RX ADMIN — Medication 5000 UNIT(S): at 06:02

## 2024-09-01 RX ADMIN — SODIUM ZIRCONIUM CYCLOSILICATE 5 GRAM(S): 5 POWDER, FOR SUSPENSION ORAL at 17:57

## 2024-09-01 RX ADMIN — INSULIN GLARGINE 10 UNIT(S): 100 INJECTION, SOLUTION SUBCUTANEOUS at 21:52

## 2024-09-01 RX ADMIN — Medication 2: at 12:07

## 2024-09-01 RX ADMIN — AMLODIPINE BESYLATE 5 MILLIGRAM(S): 10 TABLET ORAL at 06:02

## 2024-09-01 RX ADMIN — Medication 60 MILLIGRAM(S): at 12:06

## 2024-09-01 RX ADMIN — AZITHROMYCIN 500 MILLIGRAM(S): 500 TABLET, FILM COATED ORAL at 12:06

## 2024-09-01 RX ADMIN — Medication 5000 UNIT(S): at 17:57

## 2024-09-01 RX ADMIN — Medication 5 MILLIGRAM(S): at 21:52

## 2024-09-01 RX ADMIN — Medication 60 MILLIGRAM(S): at 06:02

## 2024-09-01 NOTE — PROGRESS NOTE ADULT - SUBJECTIVE AND OBJECTIVE BOX
Patient is a 78y old  Female who presents with a chief complaint of       Over Night Events:  Resting in bed.  On NC.          ROS:     All ROS are negative except HPI         PHYSICAL EXAM    ICU Vital Signs Last 24 Hrs  T(C): 36.4 (01 Sep 2024 08:00), Max: 36.6 (01 Sep 2024 04:00)  T(F): 97.6 (01 Sep 2024 08:00), Max: 97.8 (01 Sep 2024 04:00)  HR: 84 (01 Sep 2024 08:00) (74 - 84)  BP: 148/66 (01 Sep 2024 08:00) (111/58 - 157/74)  BP(mean): 95 (01 Sep 2024 08:00) (82 - 106)  ABP: --  ABP(mean): --  RR: 19 (01 Sep 2024 08:00) (16 - 19)  SpO2: 97% (01 Sep 2024 08:00) (90% - 98%)    O2 Parameters below as of 01 Sep 2024 04:00  Patient On (Oxygen Delivery Method): nasal cannula  O2 Flow (L/min): 2          CONSTITUTIONAL:   NAD    ENT:   Airway patent,   Mouth with normal mucosa.       EYES:   Pupils equal,   Round and reactive to light.    CARDIAC:   Normal rate,   Regular rhythm.    No edema    RESPIRATORY:   No wheezing  Bilateral BS  Normal chest expansion  Not tachypneic,  No use of accessory muscles    GASTROINTESTINAL:  Abdomen soft,   Non-tender,   No guarding,   + BS    MUSCULOSKELETAL:   Range of motion is not limited,  No clubbing, cyanosis    NEUROLOGICAL:   Alert and oriented   No motor  deficits.    SKIN:   Skin normal color for race,   No evidence of rash.        08-31-24 @ 07:01  -  09-01-24 @ 07:00  --------------------------------------------------------  IN:    Oral Fluid: 120 mL  Total IN: 120 mL    OUT:    Voided (mL): 1125 mL  Total OUT: 1125 mL    Total NET: -1005 mL          LABS:                            14.7   14.33 )-----------( 168      ( 01 Sep 2024 05:38 )             48.0                                               09-01    142  |  102  |  25<H>  ----------------------------<  148<H>  5.1<H>   |  36<H>  |  0.6<L>    Ca    9.4      01 Sep 2024 05:38  Mg     2.3     09-01                                               Urinalysis Basic - ( 01 Sep 2024 05:38 )    Color: x / Appearance: x / SG: x / pH: x  Gluc: 148 mg/dL / Ketone: x  / Bili: x / Urobili: x   Blood: x / Protein: x / Nitrite: x   Leuk Esterase: x / RBC: x / WBC x   Sq Epi: x / Non Sq Epi: x / Bacteria: x                                                                                                                                                                            ABG - ( 30 Aug 2024 16:41 )  pH, Arterial: 7.33  pH, Blood: x     /  pCO2: 60    /  pO2: 127   / HCO3: 32    / Base Excess: 3.7   /  SaO2: 99.6                MEDICATIONS  (STANDING):  albuterol    90 MICROgram(s) HFA Inhaler 2 Puff(s) Inhalation four times a day  albuterol/ipratropium for Nebulization 3 milliLiter(s) Nebulizer every 6 hours  ALPRAZolam 0.5 milliGRAM(s) Oral at bedtime  amLODIPine   Tablet 5 milliGRAM(s) Oral daily  atorvastatin 10 milliGRAM(s) Oral at bedtime  azithromycin   Tablet 500 milliGRAM(s) Oral daily  budesonide 160 MICROgram(s)/formoterol 4.5 MICROgram(s) Inhaler 2 Puff(s) Inhalation two times a day  chlorhexidine 2% Cloths 1 Application(s) Topical <User Schedule>  dextrose 5%. 1000 milliLiter(s) (50 mL/Hr) IV Continuous <Continuous>  dextrose 5%. 1000 milliLiter(s) (100 mL/Hr) IV Continuous <Continuous>  dextrose 50% Injectable 12.5 Gram(s) IV Push once  dextrose 50% Injectable 25 Gram(s) IV Push once  dextrose 50% Injectable 25 Gram(s) IV Push once  glucagon  Injectable 1 milliGRAM(s) IntraMuscular once  heparin   Injectable 5000 Unit(s) SubCutaneous every 12 hours  insulin glargine Injectable (LANTUS) 10 Unit(s) SubCutaneous at bedtime  insulin lispro (ADMELOG) corrective regimen sliding scale   SubCutaneous three times a day before meals  ipratropium    for Nebulization 500 MICROGram(s) Nebulizer every 6 hours  melatonin 5 milliGRAM(s) Oral at bedtime  methylPREDNISolone sodium succinate Injectable 60 milliGRAM(s) IV Push every 12 hours    MEDICATIONS  (PRN):  dextrose Oral Gel 15 Gram(s) Oral once PRN Blood Glucose LESS THAN 70 milliGRAM(s)/deciliter      New X-rays reviewed:                                                                                  ECHO

## 2024-09-01 NOTE — PROGRESS NOTE ADULT - NSPROGADDITIONALINFOA_GEN_ALL_CORE
#Progress Note Handoff  Pending (specify): iv steroids, azithro, duplex  Family discussion: d/w pt at bedside  Disposition: home vs. snf .
#Progress Note Handoff  Pending (specify): iv steroids, hyperkalemia  Family discussion: d/w pt at bedside  Disposition: home vs. snf .

## 2024-09-01 NOTE — PROGRESS NOTE ADULT - PROBLEM SELECTOR PLAN 1
presumed copd exacerbation  cxr clear  check va duplex, d-dimer  rvp +parainfluenza  azithrobb for 5 days  solumedrol 60 iv tid decrease to bid  bipap prn, qhs  nc to keep spo2 >88  duoneb q6, symbicort
presumed copd exacerbation  cxr clear  va duplex neg  rvp +parainfluenza  azithro for 5 days  solumedrol 60 iv bid decrease to daily  bipap prn, qhs  nc to keep spo2 >88; on home o2 prn  duoneb q6, symbicort

## 2024-09-01 NOTE — PROGRESS NOTE ADULT - PROBLEM SELECTOR PROBLEM 4
I and r today and pt got a lense in and out. Disc DWO and no sleeping and reviewed hygiene. Recheck in 1-2 weeks.
CAD (coronary artery disease)
Zenker diverticulum

## 2024-09-01 NOTE — PROGRESS NOTE ADULT - ASSESSMENT
IMPRESSION:    Acute on chronic hypoxemic respiratory   Acute on chronic hypercapnic respiratory failure  COPD exacerbation  HO hypertension, hyperlipidemia, CAD status post stent 2019,   hx of dysphagia and zenker diverticulum   Active smoker    PLAN:    CNS: Avoid CNS depressant    HEENT:  Oral care    PULMONARY:  HOB @ 45 degrees, aspiration precaution.  Solumedrol 60 q 12.  Triple inhaler regimen.  Albuterol PRN.  NIV during sleep and PRN during the day.  Smoking cessation     CARDIOVASCULAR:  Avoid overload, BP control    GI: GI prophylaxis                                          Feeding per speech.  Bowel regimen     RENAL:  F/u  lytes.  Correct as needed.     INFECTIOUS DISEASE: Finish Azithromycin course.      HEMATOLOGICAL:  DVT prophylaxis. FU LE duplex     ENDOCRINE:  Follow up FS.  Insulin protocol if needed.    PT OT     DGTF     OP pulm follow up 
IMPRESSION:    Acute on chronic hypoxemic respiratory   Acute on chronic hypercapnic respiratory failure  COPD exacerbation  HO hypertension, hyperlipidemia, CAD status post stent 2019,   hx of dysphagia and zenker diverticulum   Active smoker    PLAN:    CNS: Avoid CNS depressant    HEENT:  Oral care    PULMONARY:  HOB @ 45 degrees, aspiration precaution.  Solumedrol 60 q 24.  Prednisone taper in am.  Triple inhaler regimen.  Albuterol PRN.  NIV during sleep and PRN during the day.  Smoking cessation     CARDIOVASCULAR:  Avoid overload, BP control    GI: GI prophylaxis                                          Feeding per speech.  Bowel regimen     RENAL:  F/u  lytes.  Correct as needed.     INFECTIOUS DISEASE: Finish Azithromycin course.      HEMATOLOGICAL:  DVT prophylaxis. FU LE duplex     ENDOCRINE:  Follow up FS.  Insulin protocol if needed.    PT OT     DGTF     OP pulm follow up 
78F PMHx COPD on home o2 prn, HTN, CAD s/p stent 2012, zenker diverticulum here with acute on chronic hypoxic and hypercapnic resp failure.  
78F PMHx COPD on home o2 prn, HTN, CAD s/p stent 2012, zenker diverticulum here with acute on chronic hypoxic and hypercapnic resp failure.

## 2024-09-01 NOTE — PROGRESS NOTE ADULT - SUBJECTIVE AND OBJECTIVE BOX
INTERVAL HPI/OVERNIGHT EVENTS:    SUBJECTIVE: Patient seen and examined at bedside.     no cp, sob, abd pain, fever  no sob, orthopnea, pnd, cough    OBJECTIVE:    VITAL SIGNS:  Vital Signs Last 24 Hrs  T(C): 36.4 (01 Sep 2024 08:00), Max: 36.6 (01 Sep 2024 04:00)  T(F): 97.6 (01 Sep 2024 08:00), Max: 97.8 (01 Sep 2024 04:00)  HR: 84 (01 Sep 2024 08:00) (74 - 84)  BP: 148/66 (01 Sep 2024 08:00) (111/58 - 157/74)  BP(mean): 95 (01 Sep 2024 08:00) (82 - 106)  RR: 19 (01 Sep 2024 08:00) (16 - 19)  SpO2: 97% (01 Sep 2024 08:00) (90% - 98%)    Parameters below as of 01 Sep 2024 04:00  Patient On (Oxygen Delivery Method): nasal cannula  O2 Flow (L/min): 2        PHYSICAL EXAM:    General: NAD  HEENT: NC/AT; PERRL, clear conjunctiva  Neck: supple  Respiratory: CTA b/l  Cardiovascular: +S1/S2; RRR  Abdomen: soft, NT/ND; +BS x4  Extremities: WWP, 2+ peripheral pulses b/l; no LE edema  Skin: normal color and turgor; no rash  Neurological:    MEDICATIONS:  MEDICATIONS  (STANDING):  albuterol    90 MICROgram(s) HFA Inhaler 2 Puff(s) Inhalation four times a day  albuterol/ipratropium for Nebulization 3 milliLiter(s) Nebulizer every 6 hours  ALPRAZolam 0.5 milliGRAM(s) Oral at bedtime  amLODIPine   Tablet 5 milliGRAM(s) Oral daily  atorvastatin 10 milliGRAM(s) Oral at bedtime  azithromycin   Tablet 500 milliGRAM(s) Oral daily  budesonide 160 MICROgram(s)/formoterol 4.5 MICROgram(s) Inhaler 2 Puff(s) Inhalation two times a day  chlorhexidine 2% Cloths 1 Application(s) Topical <User Schedule>  dextrose 5%. 1000 milliLiter(s) (50 mL/Hr) IV Continuous <Continuous>  dextrose 5%. 1000 milliLiter(s) (100 mL/Hr) IV Continuous <Continuous>  dextrose 50% Injectable 25 Gram(s) IV Push once  dextrose 50% Injectable 12.5 Gram(s) IV Push once  dextrose 50% Injectable 25 Gram(s) IV Push once  glucagon  Injectable 1 milliGRAM(s) IntraMuscular once  heparin   Injectable 5000 Unit(s) SubCutaneous every 12 hours  insulin glargine Injectable (LANTUS) 10 Unit(s) SubCutaneous at bedtime  insulin lispro (ADMELOG) corrective regimen sliding scale   SubCutaneous three times a day before meals  ipratropium    for Nebulization 500 MICROGram(s) Nebulizer every 6 hours  melatonin 5 milliGRAM(s) Oral at bedtime  methylPREDNISolone sodium succinate Injectable 60 milliGRAM(s) IV Push every 24 hours  sodium zirconium cyclosilicate 5 Gram(s) Oral two times a day    MEDICATIONS  (PRN):  dextrose Oral Gel 15 Gram(s) Oral once PRN Blood Glucose LESS THAN 70 milliGRAM(s)/deciliter      ALLERGIES:  Allergies    No Known Allergies    Intolerances        LABS:                        14.7   14.33 )-----------( 168      ( 01 Sep 2024 05:38 )             48.0     Hemoglobin: 14.7 g/dL (09-01 @ 05:38)  Hemoglobin: 14.4 g/dL (08-31 @ 05:00)  Hemoglobin: 15.1 g/dL (08-30 @ 05:32)  Hemoglobin: 16.0 g/dL (08-29 @ 15:15)    CBC Full  -  ( 01 Sep 2024 05:38 )  WBC Count : 14.33 K/uL  RBC Count : 4.87 M/uL  Hemoglobin : 14.7 g/dL  Hematocrit : 48.0 %  Platelet Count - Automated : 168 K/uL  Mean Cell Volume : 98.6 fL  Mean Cell Hemoglobin : 30.2 pg  Mean Cell Hemoglobin Concentration : 30.6 g/dL  Auto Neutrophil # : 12.81 K/uL  Auto Lymphocyte # : 0.89 K/uL  Auto Monocyte # : 0.52 K/uL  Auto Eosinophil # : 0.00 K/uL  Auto Basophil # : 0.03 K/uL  Auto Neutrophil % : 89.4 %  Auto Lymphocyte % : 6.2 %  Auto Monocyte % : 3.6 %  Auto Eosinophil % : 0.0 %  Auto Basophil % : 0.2 %    09-01    142  |  102  |  25<H>  ----------------------------<  148<H>  5.1<H>   |  36<H>  |  0.6<L>    Ca    9.4      01 Sep 2024 05:38  Mg     2.3     09-01      Creatinine Trend: 0.6<--, 0.6<--, 0.8<--, 0.7<--        hs Troponin:    ABG - ( 30 Aug 2024 16:41 )  pH, Arterial: 7.33  pH, Blood: x     /  pCO2: 60    /  pO2: 127   / HCO3: 32    / Base Excess: 3.7   /  SaO2: 99.6                    Urinalysis Basic - ( 01 Sep 2024 05:38 )    Color: x / Appearance: x / SG: x / pH: x  Gluc: 148 mg/dL / Ketone: x  / Bili: x / Urobili: x   Blood: x / Protein: x / Nitrite: x   Leuk Esterase: x / RBC: x / WBC x   Sq Epi: x / Non Sq Epi: x / Bacteria: x      CSF:                      EKG:   MICROBIOLOGY:    IMAGING:      Labs, imaging, EKG personally reviewed    RADIOLOGY & ADDITIONAL TESTS: Reviewed.

## 2024-09-02 LAB
ANION GAP SERPL CALC-SCNC: 5 MMOL/L — LOW (ref 7–14)
BASOPHILS # BLD AUTO: 0.02 K/UL — SIGNIFICANT CHANGE UP (ref 0–0.2)
BASOPHILS NFR BLD AUTO: 0.2 % — SIGNIFICANT CHANGE UP (ref 0–1)
BUN SERPL-MCNC: 23 MG/DL — HIGH (ref 10–20)
CALCIUM SERPL-MCNC: 9.9 MG/DL — SIGNIFICANT CHANGE UP (ref 8.4–10.5)
CHLORIDE SERPL-SCNC: 103 MMOL/L — SIGNIFICANT CHANGE UP (ref 98–110)
CO2 SERPL-SCNC: 39 MMOL/L — HIGH (ref 17–32)
CREAT SERPL-MCNC: 0.6 MG/DL — LOW (ref 0.7–1.5)
EGFR: 91 ML/MIN/1.73M2 — SIGNIFICANT CHANGE UP
EOSINOPHIL # BLD AUTO: 0 K/UL — SIGNIFICANT CHANGE UP (ref 0–0.7)
EOSINOPHIL NFR BLD AUTO: 0 % — SIGNIFICANT CHANGE UP (ref 0–8)
GLUCOSE BLDC GLUCOMTR-MCNC: 109 MG/DL — HIGH (ref 70–99)
GLUCOSE BLDC GLUCOMTR-MCNC: 120 MG/DL — HIGH (ref 70–99)
GLUCOSE BLDC GLUCOMTR-MCNC: 133 MG/DL — HIGH (ref 70–99)
GLUCOSE BLDC GLUCOMTR-MCNC: 140 MG/DL — HIGH (ref 70–99)
GLUCOSE SERPL-MCNC: 109 MG/DL — HIGH (ref 70–99)
HCT VFR BLD CALC: 49.2 % — HIGH (ref 37–47)
HGB BLD-MCNC: 15.3 G/DL — SIGNIFICANT CHANGE UP (ref 12–16)
IMM GRANULOCYTES NFR BLD AUTO: 0.9 % — HIGH (ref 0.1–0.3)
LYMPHOCYTES # BLD AUTO: 0.94 K/UL — LOW (ref 1.2–3.4)
LYMPHOCYTES # BLD AUTO: 8.8 % — LOW (ref 20.5–51.1)
MAGNESIUM SERPL-MCNC: 2.4 MG/DL — SIGNIFICANT CHANGE UP (ref 1.8–2.4)
MCHC RBC-ENTMCNC: 30.2 PG — SIGNIFICANT CHANGE UP (ref 27–31)
MCHC RBC-ENTMCNC: 31.1 G/DL — LOW (ref 32–37)
MCV RBC AUTO: 97 FL — SIGNIFICANT CHANGE UP (ref 81–99)
MONOCYTES # BLD AUTO: 0.71 K/UL — HIGH (ref 0.1–0.6)
MONOCYTES NFR BLD AUTO: 6.7 % — SIGNIFICANT CHANGE UP (ref 1.7–9.3)
NEUTROPHILS # BLD AUTO: 8.9 K/UL — HIGH (ref 1.4–6.5)
NEUTROPHILS NFR BLD AUTO: 83.4 % — HIGH (ref 42.2–75.2)
NRBC # BLD: 0 /100 WBCS — SIGNIFICANT CHANGE UP (ref 0–0)
PLATELET # BLD AUTO: 207 K/UL — SIGNIFICANT CHANGE UP (ref 130–400)
PMV BLD: 10.8 FL — HIGH (ref 7.4–10.4)
POTASSIUM SERPL-MCNC: 4.6 MMOL/L — SIGNIFICANT CHANGE UP (ref 3.5–5)
POTASSIUM SERPL-SCNC: 4.6 MMOL/L — SIGNIFICANT CHANGE UP (ref 3.5–5)
RBC # BLD: 5.07 M/UL — SIGNIFICANT CHANGE UP (ref 4.2–5.4)
RBC # FLD: 13.2 % — SIGNIFICANT CHANGE UP (ref 11.5–14.5)
SODIUM SERPL-SCNC: 147 MMOL/L — HIGH (ref 135–146)
WBC # BLD: 10.67 K/UL — SIGNIFICANT CHANGE UP (ref 4.8–10.8)
WBC # FLD AUTO: 10.67 K/UL — SIGNIFICANT CHANGE UP (ref 4.8–10.8)

## 2024-09-02 PROCEDURE — 99232 SBSQ HOSP IP/OBS MODERATE 35: CPT

## 2024-09-02 RX ORDER — GUAIFENESIN/DEXTROMETHORPHAN 100-10MG/5
10 SYRUP ORAL EVERY 6 HOURS
Refills: 0 | Status: DISCONTINUED | OUTPATIENT
Start: 2024-09-02 | End: 2024-09-06

## 2024-09-02 RX ORDER — PANTOPRAZOLE SODIUM 40 MG
40 TABLET, DELAYED RELEASE (ENTERIC COATED) ORAL
Refills: 0 | Status: DISCONTINUED | OUTPATIENT
Start: 2024-09-02 | End: 2024-09-06

## 2024-09-02 RX ADMIN — Medication 5000 UNIT(S): at 06:20

## 2024-09-02 RX ADMIN — AZITHROMYCIN 500 MILLIGRAM(S): 500 TABLET, FILM COATED ORAL at 13:27

## 2024-09-02 RX ADMIN — INSULIN GLARGINE 10 UNIT(S): 100 INJECTION, SOLUTION SUBCUTANEOUS at 21:06

## 2024-09-02 RX ADMIN — CHLORHEXIDINE GLUCONATE 1 APPLICATION(S): 40 SOLUTION TOPICAL at 06:21

## 2024-09-02 RX ADMIN — Medication 10 MILLILITER(S): at 18:22

## 2024-09-02 RX ADMIN — AMLODIPINE BESYLATE 5 MILLIGRAM(S): 10 TABLET ORAL at 06:20

## 2024-09-02 RX ADMIN — Medication 10 MILLILITER(S): at 13:28

## 2024-09-02 RX ADMIN — Medication 60 MILLIGRAM(S): at 13:28

## 2024-09-02 RX ADMIN — Medication 5 MILLIGRAM(S): at 21:05

## 2024-09-02 RX ADMIN — Medication 10 MILLIGRAM(S): at 21:05

## 2024-09-02 RX ADMIN — SODIUM ZIRCONIUM CYCLOSILICATE 5 GRAM(S): 5 POWDER, FOR SUSPENSION ORAL at 06:20

## 2024-09-02 RX ADMIN — Medication 0.5 MILLIGRAM(S): at 21:05

## 2024-09-02 RX ADMIN — Medication 5000 UNIT(S): at 18:34

## 2024-09-02 NOTE — PROGRESS NOTE ADULT - SUBJECTIVE AND OBJECTIVE BOX
DESTINY TONEY 78y Female  MRN#: [id number]     Hospital Day: 4d    Pt is currently admitted with the primary diagnosis of     Overnight events   -No major overnight events    -Pt refused meds ON                                          ----------------------------------------------------------  OBJECTIVE  PAST MEDICAL & SURGICAL HISTORY  CVA (cerebral vascular accident)    Anxiety    HTN (hypertension)    COPD (chronic obstructive pulmonary disease)    S/P total abdominal hysterectomy                                              -----------------------------------------------------------  MEDICATIONS:  STANDING MEDICATIONS  albuterol    90 MICROgram(s) HFA Inhaler 2 Puff(s) Inhalation four times a day  albuterol/ipratropium for Nebulization 3 milliLiter(s) Nebulizer every 6 hours  ALPRAZolam 0.5 milliGRAM(s) Oral at bedtime  amLODIPine   Tablet 5 milliGRAM(s) Oral daily  atorvastatin 10 milliGRAM(s) Oral at bedtime  azithromycin   Tablet 500 milliGRAM(s) Oral daily  budesonide 160 MICROgram(s)/formoterol 4.5 MICROgram(s) Inhaler 2 Puff(s) Inhalation two times a day  chlorhexidine 2% Cloths 1 Application(s) Topical <User Schedule>  dextrose 5%. 1000 milliLiter(s) IV Continuous <Continuous>  dextrose 5%. 1000 milliLiter(s) IV Continuous <Continuous>  dextrose 50% Injectable 12.5 Gram(s) IV Push once  dextrose 50% Injectable 25 Gram(s) IV Push once  dextrose 50% Injectable 25 Gram(s) IV Push once  glucagon  Injectable 1 milliGRAM(s) IntraMuscular once  guaifenesin/dextromethorphan Oral Liquid 10 milliLiter(s) Oral every 6 hours  heparin   Injectable 5000 Unit(s) SubCutaneous every 12 hours  insulin glargine Injectable (LANTUS) 10 Unit(s) SubCutaneous at bedtime  insulin lispro (ADMELOG) corrective regimen sliding scale   SubCutaneous three times a day before meals  ipratropium    for Nebulization 500 MICROGram(s) Nebulizer every 6 hours  melatonin 5 milliGRAM(s) Oral at bedtime  methylPREDNISolone sodium succinate Injectable 60 milliGRAM(s) IV Push every 24 hours    PRN MEDICATIONS  dextrose Oral Gel 15 Gram(s) Oral once PRN                                            ------------------------------------------------------------  VITAL SIGNS: Last 24 Hours  T(C): 36.4 (02 Sep 2024 05:25), Max: 36.4 (02 Sep 2024 01:51)  T(F): 97.5 (02 Sep 2024 05:25), Max: 97.6 (02 Sep 2024 01:51)  HR: 74 (02 Sep 2024 05:25) (63 - 84)  BP: 154/69 (02 Sep 2024 05:25) (139/67 - 180/74)  BP(mean): 96 (02 Sep 2024 01:24) (94 - 106)  RR: 18 (02 Sep 2024 05:25) (17 - 33)  SpO2: 97% (02 Sep 2024 05:25) (95% - 99%)      09-01-24 @ 07:01  -  09-02-24 @ 07:00  --------------------------------------------------------  IN: 0 mL / OUT: 1350 mL / NET: -1350 mL                                             --------------------------------------------------------------  LABS:                        15.3   10.67 )-----------( 207      ( 02 Sep 2024 08:44 )             49.2     09-02    147<H>  |  103  |  23<H>  ----------------------------<  109<H>  4.6   |  39<H>  |  0.6<L>    Ca    9.9      02 Sep 2024 08:44  Mg     2.4     09-02        Urinalysis Basic - ( 02 Sep 2024 08:44 )    Color: x / Appearance: x / SG: x / pH: x  Gluc: 109 mg/dL / Ketone: x  / Bili: x / Urobili: x   Blood: x / Protein: x / Nitrite: x   Leuk Esterase: x / RBC: x / WBC x   Sq Epi: x / Non Sq Epi: x / Bacteria: x                                                            --------------------------------------------------------------  PHYSICAL EXAM:  GENERAL: Awake, alert, oriented to person, place, time, situation. Well-nourished, laying in bed appearing in no acute distress  HEENT: No FNDs, atraumatic, normocephalic  LUNGS: wheezing on ausculation   HEART: S1/S2. No heaves or thrills  ABD: Soft, non-tender, non-distended.  EXT/NEURO: Strength, sensation and ROM grossly intact.  SKIN: No edema    PLAN:  #Acute Hypercapnic Respiratory Failure 2/2 COPD Exacerbation  - VBG: PCO2 88 --> 81 (08/29)  - CXR: hyperinflatation without consolidation   -s/p solumedrol and duonebs  - Procal, MRSA, Legionella, strep pending  - BiPAP qhs; on NC keep spO2 >88; on home O2 PRN  - c/w solumedrol 60 mg IV BID --> decrease to qd  - c/w duonebs, symbicort, spiriva  - c/w azithromycin x 5 days -> end 9/3  - robitussin for cough    #hyperkalemia  - ECG 8/29 NSR  - low K diet  - CTM    #HTN  #HLD  #CAD s/p stents  - c/w Norvasc 5  - c/w Lipitor 10    #H/o Dysphagia & Zenker  - SLP consulted- cleared for soft bite sized diet  - F/u OP    #DM  - A1c 6.7  - monitor FS  - c/w ISS    # DVT prophylaxis: Heparin  # GI prophylaxis: pantoprazole  # Diet: Soft-bite sized   # Activity: IAT  # Code status: Full Code  # Disposition:

## 2024-09-03 LAB
ANION GAP SERPL CALC-SCNC: 6 MMOL/L — LOW (ref 7–14)
BASE EXCESS BLDA CALC-SCNC: 19.7 MMOL/L — HIGH (ref -2–3)
BASOPHILS # BLD AUTO: 0.04 K/UL — SIGNIFICANT CHANGE UP (ref 0–0.2)
BASOPHILS NFR BLD AUTO: 0.4 % — SIGNIFICANT CHANGE UP (ref 0–1)
BUN SERPL-MCNC: 22 MG/DL — HIGH (ref 10–20)
CALCIUM SERPL-MCNC: 9.6 MG/DL — SIGNIFICANT CHANGE UP (ref 8.4–10.5)
CHLORIDE SERPL-SCNC: 99 MMOL/L — SIGNIFICANT CHANGE UP (ref 98–110)
CO2 SERPL-SCNC: 42 MMOL/L — CRITICAL HIGH (ref 17–32)
CREAT SERPL-MCNC: 0.5 MG/DL — LOW (ref 0.7–1.5)
EGFR: 95 ML/MIN/1.73M2 — SIGNIFICANT CHANGE UP
EOSINOPHIL # BLD AUTO: 0.41 K/UL — SIGNIFICANT CHANGE UP (ref 0–0.7)
EOSINOPHIL NFR BLD AUTO: 3.9 % — SIGNIFICANT CHANGE UP (ref 0–8)
GLUCOSE BLDC GLUCOMTR-MCNC: 113 MG/DL — HIGH (ref 70–99)
GLUCOSE BLDC GLUCOMTR-MCNC: 128 MG/DL — HIGH (ref 70–99)
GLUCOSE BLDC GLUCOMTR-MCNC: 135 MG/DL — HIGH (ref 70–99)
GLUCOSE SERPL-MCNC: 69 MG/DL — LOW (ref 70–99)
HCO3 BLDA-SCNC: 49 MMOL/L — CRITICAL HIGH (ref 21–28)
HCT VFR BLD CALC: 50.9 % — HIGH (ref 37–47)
HGB BLD-MCNC: 15.5 G/DL — SIGNIFICANT CHANGE UP (ref 12–16)
HOROWITZ INDEX BLDA+IHG-RTO: 21 — SIGNIFICANT CHANGE UP
IMM GRANULOCYTES NFR BLD AUTO: 1.2 % — HIGH (ref 0.1–0.3)
LYMPHOCYTES # BLD AUTO: 1.46 K/UL — SIGNIFICANT CHANGE UP (ref 1.2–3.4)
LYMPHOCYTES # BLD AUTO: 14.1 % — LOW (ref 20.5–51.1)
MAGNESIUM SERPL-MCNC: 2.4 MG/DL — SIGNIFICANT CHANGE UP (ref 1.8–2.4)
MCHC RBC-ENTMCNC: 30.1 PG — SIGNIFICANT CHANGE UP (ref 27–31)
MCHC RBC-ENTMCNC: 30.5 G/DL — LOW (ref 32–37)
MCV RBC AUTO: 98.8 FL — SIGNIFICANT CHANGE UP (ref 81–99)
MONOCYTES # BLD AUTO: 0.91 K/UL — HIGH (ref 0.1–0.6)
MONOCYTES NFR BLD AUTO: 8.8 % — SIGNIFICANT CHANGE UP (ref 1.7–9.3)
NEUTROPHILS # BLD AUTO: 7.44 K/UL — HIGH (ref 1.4–6.5)
NEUTROPHILS NFR BLD AUTO: 71.6 % — SIGNIFICANT CHANGE UP (ref 42.2–75.2)
NRBC # BLD: 0 /100 WBCS — SIGNIFICANT CHANGE UP (ref 0–0)
PCO2 BLDA: 77 MMHG — CRITICAL HIGH (ref 32–45)
PH BLDA: 7.41 — SIGNIFICANT CHANGE UP (ref 7.35–7.45)
PLATELET # BLD AUTO: 177 K/UL — SIGNIFICANT CHANGE UP (ref 130–400)
PMV BLD: 10.9 FL — HIGH (ref 7.4–10.4)
PO2 BLDA: 49 MMHG — CRITICAL LOW (ref 83–108)
POTASSIUM SERPL-MCNC: 4.9 MMOL/L — SIGNIFICANT CHANGE UP (ref 3.5–5)
POTASSIUM SERPL-SCNC: 4.9 MMOL/L — SIGNIFICANT CHANGE UP (ref 3.5–5)
RBC # BLD: 5.15 M/UL — SIGNIFICANT CHANGE UP (ref 4.2–5.4)
RBC # FLD: 12.9 % — SIGNIFICANT CHANGE UP (ref 11.5–14.5)
SAO2 % BLDA: 86.4 % — LOW (ref 94–98)
SODIUM SERPL-SCNC: 147 MMOL/L — HIGH (ref 135–146)
WBC # BLD: 10.38 K/UL — SIGNIFICANT CHANGE UP (ref 4.8–10.8)
WBC # FLD AUTO: 10.38 K/UL — SIGNIFICANT CHANGE UP (ref 4.8–10.8)

## 2024-09-03 PROCEDURE — 99232 SBSQ HOSP IP/OBS MODERATE 35: CPT

## 2024-09-03 PROCEDURE — 71045 X-RAY EXAM CHEST 1 VIEW: CPT | Mod: 26

## 2024-09-03 RX ORDER — METHYLPREDNISOLONE 4 MG
40 TABLET ORAL THREE TIMES A DAY
Refills: 0 | Status: DISCONTINUED | OUTPATIENT
Start: 2024-09-03 | End: 2024-09-05

## 2024-09-03 RX ORDER — PREDNISONE 10 MG
40 TABLET, DOSE PACK ORAL DAILY
Refills: 0 | Status: DISCONTINUED | OUTPATIENT
Start: 2024-09-03 | End: 2024-09-03

## 2024-09-03 RX ADMIN — Medication 10 MILLILITER(S): at 05:12

## 2024-09-03 RX ADMIN — Medication 5000 UNIT(S): at 05:12

## 2024-09-03 RX ADMIN — AZITHROMYCIN 500 MILLIGRAM(S): 500 TABLET, FILM COATED ORAL at 12:22

## 2024-09-03 RX ADMIN — IPRATROPIUM BROMIDE AND ALBUTEROL SULFATE 3 MILLILITER(S): .5; 3 SOLUTION RESPIRATORY (INHALATION) at 20:15

## 2024-09-03 RX ADMIN — Medication 75 MILLILITER(S): at 14:28

## 2024-09-03 RX ADMIN — Medication 5 MILLIGRAM(S): at 21:01

## 2024-09-03 RX ADMIN — Medication 10 MILLILITER(S): at 12:22

## 2024-09-03 RX ADMIN — AMLODIPINE BESYLATE 5 MILLIGRAM(S): 10 TABLET ORAL at 05:12

## 2024-09-03 RX ADMIN — Medication 40 MILLIGRAM(S): at 05:12

## 2024-09-03 RX ADMIN — Medication 40 MILLIGRAM(S): at 12:22

## 2024-09-03 RX ADMIN — Medication 10 MILLIGRAM(S): at 21:01

## 2024-09-03 RX ADMIN — Medication 40 MILLIGRAM(S): at 21:01

## 2024-09-03 RX ADMIN — CHLORHEXIDINE GLUCONATE 1 APPLICATION(S): 40 SOLUTION TOPICAL at 05:22

## 2024-09-03 RX ADMIN — INSULIN GLARGINE 10 UNIT(S): 100 INJECTION, SOLUTION SUBCUTANEOUS at 21:26

## 2024-09-03 RX ADMIN — Medication 5000 UNIT(S): at 17:37

## 2024-09-03 RX ADMIN — Medication 0.5 MILLIGRAM(S): at 21:01

## 2024-09-03 RX ADMIN — Medication 10 MILLILITER(S): at 17:37

## 2024-09-03 NOTE — SWALLOW BEDSIDE ASSESSMENT ADULT - SWALLOW EVAL: DIAGNOSIS
Toleration of puree, soft & bite sized, and regular with thin liquids w/o overt s/s of penetration/aspiration. Mild oral dysphagia and toleration for soft & bite sized, and regular with thin liquids w/o overt s/s of penetration/aspiration.

## 2024-09-03 NOTE — SWALLOW BEDSIDE ASSESSMENT ADULT - SWALLOW EVAL: RECOMMENDED DIET
Regular with thin liquids Continue with a soft & bite sized diet with thin liquids w/chin tuck strategy

## 2024-09-03 NOTE — SWALLOW BEDSIDE ASSESSMENT ADULT - SLP PERTINENT HISTORY OF CURRENT PROBLEM
78-year-old female past medical history of COPD not on home O2, current smoker, hypertension, hyperlipidemia, CAD status post stent 2019, hx of dysphagia and zenker diverticulum present for shortness of breath. to note that the patient was recently discharged in june for COPD exacerbation. history goes back to yesterday when the patient started having dizziness and shorntess of breath,. during my examination i removed the BiPAP to speak and see how she would tolerate  off BiPAP the patient desaturated over a duration of 2 mintues to low 80s, BiPAP was placed back on and the patient's saturation got better. Patient denies any CP, headache, urinary symptoms. I spoke to the son and he confirmed that the patient is not very compliant with her medications especially her nebulizers. No fevers or chills reported. On examination patient is significantly wheezing. PAtient is admitted for COPD excacerbation ; 78-year-old female past medical history of COPD not on home O2, current smoker, hypertension, hyperlipidemia, CAD status post stent 2019, hx of dysphagia and zenker diverticulum present for shortness of breath. to note that the patient was recently discharged in june for COPD exacerbation. history goes back to yesterday when the patient started having dizziness and shortness of breath. Pt's son confirmed that the patient is not very compliant with her medications especially her nebulizers. No fevers or chills reported. On examination patient is significantly wheezing. Admitted for COPD exacerbation. CXR is WFL

## 2024-09-03 NOTE — PROGRESS NOTE ADULT - ATTENDING COMMENTS
78F PMHx COPD on home o2 prn, HTN, CAD s/p stent 2012, zenker diverticulum here with acute on chronic hypoxic and hypercapnic resp failure.    #Acute on chronic respiratory failure with hypoxia and hypercapnia.   COPD exacerbation due to URI Parainfuenza infection   cxr clear  va duplex neg  rvp +parainfluenza  azithro for 5 days  solumedrol 60 iv bid decrease to daily  bipap prn, qhs  nc to keep spo2 >88; on home o2 prn  duoneb q6, symbicort.    Hyperkalemia- corrected post lokelma tx.   - monitor BMP     HTN -resumed on home regimen tx.       CAD (coronary artery disease)- stable, continue home regimen tx.     Zenker diverticulum.   ·  Plan: outpt f/u.     On deep vein thrombosis (DVT) prophylaxis.  subq hep.    Code status: DNR/DNI     #Progress Note Handoff: patient with persistent bronchospasm, continue  iv steroids, nebs, pulse ox monitoring, BMP in am  Family discussion: d/w pt at bedside  Disposition: home once medically stable    Total time spent to complete patient's bedside assessment, review medical chart, discuss medical plan of care with covering medical team was more than 35 minutes with >50% of time spent face to face with patient, discussion with patient/family and/or coordination of care
78F PMHx COPD on home o2 prn, HTN, CAD s/p stent 2012, zenker diverticulum here with acute on chronic hypoxic and hypercapnic resp failure.    #Acute on chronic respiratory failure with hypoxia and hypercapnia.   COPD exacerbation due to URI Parainfuenza infection   cxr clear  va duplex neg  rvp +parainfluenza  azithro for 5 days  solumedrol taper to PO Prednisone in 24 hrs  bipap prn, qhs  nc to keep spo2 >88; on home o2 prn  duoneb q6, symbicort.    Hyperkalemia- corrected post lokelma tx.   - monitor BMP     HTN -resumed on home regimen tx.       CAD (coronary artery disease)- stable, continue home regimen tx.     Zenker diverticulum.   ·  Plan: outpt f/u.     On deep vein thrombosis (DVT) prophylaxis.  subq hep.    Code status: DNR/DNI     #Progress Note Handoff: continue to taper  steroids, nebs, pulse ox monitoring, BMP in am, obtain routine ABG today  Family discussion: d/w pt at bedside  Disposition: home once medically stable    Total time spent to complete patient's bedside assessment, review medical chart, discuss medical plan of care with covering medical team was more than 35 minutes with >50% of time spent face to face with patient, discussion with patient/family and/or coordination of care .

## 2024-09-03 NOTE — SWALLOW BEDSIDE ASSESSMENT ADULT - SWALLOW EVAL: PATIENT/FAMILY GOALS STATEMENT
Pt and Son at bedside were educated on recent MBS results and recs for thins w/chin tuck strategy. Pt and son report understanding

## 2024-09-03 NOTE — PROGRESS NOTE ADULT - SUBJECTIVE AND OBJECTIVE BOX
DESTINY TONEY 78y Female  MRN#: [id number]     Hospital Day: 5d    Pt is currently admitted with the primary diagnosis of COPD exacerbation    Overnight events   -No major overnight events    -Pt confused this AM states she is [place]                                            ----------------------------------------------------------  OBJECTIVE  PAST MEDICAL & SURGICAL HISTORY  CVA (cerebral vascular accident)    Anxiety    HTN (hypertension)    COPD (chronic obstructive pulmonary disease)    S/P total abdominal hysterectomy                                              -----------------------------------------------------------  MEDICATIONS:  STANDING MEDICATIONS  albuterol    90 MICROgram(s) HFA Inhaler 2 Puff(s) Inhalation four times a day  albuterol/ipratropium for Nebulization 3 milliLiter(s) Nebulizer every 6 hours  ALPRAZolam 0.5 milliGRAM(s) Oral at bedtime  amLODIPine   Tablet 5 milliGRAM(s) Oral daily  atorvastatin 10 milliGRAM(s) Oral at bedtime  azithromycin   Tablet 500 milliGRAM(s) Oral daily  budesonide 160 MICROgram(s)/formoterol 4.5 MICROgram(s) Inhaler 2 Puff(s) Inhalation two times a day  chlorhexidine 2% Cloths 1 Application(s) Topical <User Schedule>  dextrose 5%. 1000 milliLiter(s) IV Continuous <Continuous>  dextrose 5%. 1000 milliLiter(s) IV Continuous <Continuous>  dextrose 50% Injectable 12.5 Gram(s) IV Push once  dextrose 50% Injectable 25 Gram(s) IV Push once  dextrose 50% Injectable 25 Gram(s) IV Push once  glucagon  Injectable 1 milliGRAM(s) IntraMuscular once  guaifenesin/dextromethorphan Oral Liquid 10 milliLiter(s) Oral every 6 hours  heparin   Injectable 5000 Unit(s) SubCutaneous every 12 hours  insulin glargine Injectable (LANTUS) 10 Unit(s) SubCutaneous at bedtime  insulin lispro (ADMELOG) corrective regimen sliding scale   SubCutaneous three times a day before meals  ipratropium    for Nebulization 500 MICROGram(s) Nebulizer every 6 hours  melatonin 5 milliGRAM(s) Oral at bedtime  methylPREDNISolone sodium succinate Injectable 60 milliGRAM(s) IV Push every 24 hours    PRN MEDICATIONS  dextrose Oral Gel 15 Gram(s) Oral once PRN                                            ------------------------------------------------------------  VITAL SIGNS: Last 24 Hours  T(C): 36.4 (09-03-24 @ 05:20), Max: 36.4 (09-02-24 @ 11:54)  T(F): 97.5 (09-03-24 @ 05:20), Max: 97.6 (09-02-24 @ 11:54)  HR: 89 (09-03-24 @ 05:20) (78 - 89)  BP: 167/73 (09-03-24 @ 05:20) (153/68 - 167/73)  ABP: --  ABP(mean): --  RR: 18 (09-03-24 @ 05:20) (18 - 20) 5LNC  SpO2: 97% (09-03-24 @ 05:20) (97% - 97%)                                             --------------------------------------------------------------  LABS:           LABS:                          15.5   10.38 )-----------( 177      ( 03 Sep 2024 09:08 )             50.9     09-02    147<H>  |  103  |  23<H>  ----------------------------<  109<H>  4.6   |  39<H>  |  0.6<L>    Ca    9.9      02 Sep 2024 08:44  Mg     2.4     09-02          Urinalysis Basic - ( 02 Sep 2024 08:44 )    Color: x / Appearance: x / SG: x / pH: x  Gluc: 109 mg/dL / Ketone: x  / Bili: x / Urobili: x   Blood: x / Protein: x / Nitrite: x   Leuk Esterase: x / RBC: x / WBC x   Sq Epi: x / Non Sq Epi: x / Bacteria: x                                                  --------------------------------------------------------------  PHYSICAL EXAM:  GENERAL: A&Ox2 - confused stats she is in [facility]. laying in bed with NC out of place (corrected)   HEENT: atraumatic, normocephalic  LUNGS: minor wheezing on ausculation of anterior lung fields   HEART: S1/S2. No heaves or thrills  ABD: Soft, non-tender, non-distended.  EXT/NEURO: Strength, sensation and ROM grossly intact.  SKIN: No edema    PLAN:  #Acute Hypercapnic Respiratory Failure 2/2 COPD Exacerbation  - VBG: PCO2 88 --> 81 (08/29)  - CXR: hyperinflatation without consolidation   -s/p solumedrol and duonebs  - Procal, MRSA, Legionella, strep pending  - BiPAP qhs; on NC keep spO2 >88; on home O2 PRN  - c/w solumedrol 60 mg IV BID --> decrease to 40mg qd tomorrow  - c/w weaning O2  - c/w duonebs, symbicort, spiriva  - c/w azithromycin x 5 days -> end today 9/3  - robitussin for cough  - PT    #hyperkalemia  - ECG 8/29 NSR  - low K diet  - CTM    #HTN  #HLD  #CAD s/p stents  - c/w Norvasc 5  - c/w Lipitor 10    #H/o Dysphagia & Zenker  - SLP consulted- cleared for soft bite sized diet  - F/u OP    #DM  - A1c 6.7  - monitor FS  - c/w ISS    # DVT prophylaxis: Heparin  # GI prophylaxis: pantoprazole  # Diet: Soft-bite sized   # Activity: IAT  # Code status: Full Code  # Disposition: pending placement

## 2024-09-04 LAB
ANION GAP SERPL CALC-SCNC: 6 MMOL/L — LOW (ref 7–14)
BASOPHILS # BLD AUTO: 0.02 K/UL — SIGNIFICANT CHANGE UP (ref 0–0.2)
BASOPHILS NFR BLD AUTO: 0.2 % — SIGNIFICANT CHANGE UP (ref 0–1)
BUN SERPL-MCNC: 20 MG/DL — SIGNIFICANT CHANGE UP (ref 10–20)
CALCIUM SERPL-MCNC: 9.3 MG/DL — SIGNIFICANT CHANGE UP (ref 8.4–10.5)
CHLORIDE SERPL-SCNC: 96 MMOL/L — LOW (ref 98–110)
CO2 SERPL-SCNC: 39 MMOL/L — HIGH (ref 17–32)
CREAT SERPL-MCNC: <0.5 MG/DL — LOW (ref 0.7–1.5)
EGFR: 100 ML/MIN/1.73M2 — SIGNIFICANT CHANGE UP
EOSINOPHIL # BLD AUTO: 0.03 K/UL — SIGNIFICANT CHANGE UP (ref 0–0.7)
EOSINOPHIL NFR BLD AUTO: 0.2 % — SIGNIFICANT CHANGE UP (ref 0–8)
GAS PNL BLDA: SIGNIFICANT CHANGE UP
GLUCOSE BLDC GLUCOMTR-MCNC: 139 MG/DL — HIGH (ref 70–99)
GLUCOSE BLDC GLUCOMTR-MCNC: 149 MG/DL — HIGH (ref 70–99)
GLUCOSE BLDC GLUCOMTR-MCNC: 183 MG/DL — HIGH (ref 70–99)
GLUCOSE BLDC GLUCOMTR-MCNC: 219 MG/DL — HIGH (ref 70–99)
GLUCOSE BLDC GLUCOMTR-MCNC: 96 MG/DL — SIGNIFICANT CHANGE UP (ref 70–99)
GLUCOSE SERPL-MCNC: 88 MG/DL — SIGNIFICANT CHANGE UP (ref 70–99)
HCT VFR BLD CALC: 48.9 % — HIGH (ref 37–47)
HGB BLD-MCNC: 15.1 G/DL — SIGNIFICANT CHANGE UP (ref 12–16)
IMM GRANULOCYTES NFR BLD AUTO: 0.5 % — HIGH (ref 0.1–0.3)
LYMPHOCYTES # BLD AUTO: 0.96 K/UL — LOW (ref 1.2–3.4)
LYMPHOCYTES # BLD AUTO: 7.2 % — LOW (ref 20.5–51.1)
MAGNESIUM SERPL-MCNC: 2.1 MG/DL — SIGNIFICANT CHANGE UP (ref 1.8–2.4)
MCHC RBC-ENTMCNC: 30.1 PG — SIGNIFICANT CHANGE UP (ref 27–31)
MCHC RBC-ENTMCNC: 30.9 G/DL — LOW (ref 32–37)
MCV RBC AUTO: 97.4 FL — SIGNIFICANT CHANGE UP (ref 81–99)
MONOCYTES # BLD AUTO: 0.52 K/UL — SIGNIFICANT CHANGE UP (ref 0.1–0.6)
MONOCYTES NFR BLD AUTO: 3.9 % — SIGNIFICANT CHANGE UP (ref 1.7–9.3)
NEUTROPHILS # BLD AUTO: 11.66 K/UL — HIGH (ref 1.4–6.5)
NEUTROPHILS NFR BLD AUTO: 88 % — HIGH (ref 42.2–75.2)
NRBC # BLD: 0 /100 WBCS — SIGNIFICANT CHANGE UP (ref 0–0)
PLATELET # BLD AUTO: 188 K/UL — SIGNIFICANT CHANGE UP (ref 130–400)
PMV BLD: 10.8 FL — HIGH (ref 7.4–10.4)
POTASSIUM SERPL-MCNC: 4.5 MMOL/L — SIGNIFICANT CHANGE UP (ref 3.5–5)
POTASSIUM SERPL-SCNC: 4.5 MMOL/L — SIGNIFICANT CHANGE UP (ref 3.5–5)
RBC # BLD: 5.02 M/UL — SIGNIFICANT CHANGE UP (ref 4.2–5.4)
RBC # FLD: 12.7 % — SIGNIFICANT CHANGE UP (ref 11.5–14.5)
SODIUM SERPL-SCNC: 141 MMOL/L — SIGNIFICANT CHANGE UP (ref 135–146)
WBC # BLD: 13.26 K/UL — HIGH (ref 4.8–10.8)
WBC # FLD AUTO: 13.26 K/UL — HIGH (ref 4.8–10.8)

## 2024-09-04 PROCEDURE — 99232 SBSQ HOSP IP/OBS MODERATE 35: CPT

## 2024-09-04 RX ADMIN — IPRATROPIUM BROMIDE 500 MICROGRAM(S): 0.5 SOLUTION RESPIRATORY (INHALATION) at 20:16

## 2024-09-04 RX ADMIN — Medication 40 MILLIGRAM(S): at 05:14

## 2024-09-04 RX ADMIN — Medication 10 MILLIGRAM(S): at 21:00

## 2024-09-04 RX ADMIN — Medication 10 MILLILITER(S): at 05:14

## 2024-09-04 RX ADMIN — INSULIN GLARGINE 10 UNIT(S): 100 INJECTION, SOLUTION SUBCUTANEOUS at 21:00

## 2024-09-04 RX ADMIN — CHLORHEXIDINE GLUCONATE 1 APPLICATION(S): 40 SOLUTION TOPICAL at 05:14

## 2024-09-04 RX ADMIN — Medication 5000 UNIT(S): at 17:43

## 2024-09-04 RX ADMIN — Medication 75 MILLILITER(S): at 12:23

## 2024-09-04 RX ADMIN — IPRATROPIUM BROMIDE AND ALBUTEROL SULFATE 3 MILLILITER(S): .5; 3 SOLUTION RESPIRATORY (INHALATION) at 14:18

## 2024-09-04 RX ADMIN — Medication 10 MILLILITER(S): at 12:20

## 2024-09-04 RX ADMIN — Medication 10 MILLILITER(S): at 17:43

## 2024-09-04 RX ADMIN — Medication 40 MILLIGRAM(S): at 13:25

## 2024-09-04 RX ADMIN — Medication 0.5 MILLIGRAM(S): at 21:00

## 2024-09-04 RX ADMIN — Medication 40 MILLIGRAM(S): at 21:01

## 2024-09-04 RX ADMIN — IPRATROPIUM BROMIDE AND ALBUTEROL SULFATE 3 MILLILITER(S): .5; 3 SOLUTION RESPIRATORY (INHALATION) at 08:18

## 2024-09-04 RX ADMIN — Medication 5000 UNIT(S): at 05:14

## 2024-09-04 RX ADMIN — AMLODIPINE BESYLATE 5 MILLIGRAM(S): 10 TABLET ORAL at 05:14

## 2024-09-04 RX ADMIN — Medication 5 MILLIGRAM(S): at 21:00

## 2024-09-04 RX ADMIN — Medication 75 MILLILITER(S): at 13:25

## 2024-09-04 NOTE — SWALLOW BEDSIDE ASSESSMENT ADULT - ORAL PHASE
Within functional limits
Delayed oral transit time
Decreased anterior-posterior movement of the bolus/Delayed oral transit time

## 2024-09-04 NOTE — SWALLOW BEDSIDE ASSESSMENT ADULT - SWALLOW EVAL: DIAGNOSIS
+ overt s/s of aspiration w/thin liquids, unable to do chin tuck strategy 2/2 poor command following and confusion. Mild oral dysphagia and toleration for soft & bite sized, and mildly thick liquids w/o overt s/s of penetration/aspiration. + overt s/s of aspiration w/thin liquids, unable to implement chin tuck strategy 2/2 poor command following and confusion. Mild oral dysphagia and toleration for soft & bite sized, and mildly thick liquids w/o overt s/s of penetration/aspiration.

## 2024-09-04 NOTE — SWALLOW BEDSIDE ASSESSMENT ADULT - SLP GENERAL OBSERVATIONS
Received awake, confused, ox2 (self, place), o2 NC, +generalized weakness.
Pt. received in bed awake and alert on 2L O2 nasal cannula.
Received awake, confused, ox2 (self, place), perseverating on , 1L NC, +wheezing. Son present at bedside. +contact isolation for Flu, precautions observed.

## 2024-09-04 NOTE — PROGRESS NOTE ADULT - SUBJECTIVE AND OBJECTIVE BOX
DESTINY TONEY 78y Female  MRN#: [id number]     Hospital Day: 5d    Pt is currently admitted with the primary diagnosis of COPD exacerbation    Overnight events   -No major overnight events    Pt doing clinically better today. Rolando CP, SOB  On Bipap overnight                                            ----------------------------------------------------------  OBJECTIVE  PAST MEDICAL & SURGICAL HISTORY  CVA (cerebral vascular accident)    Anxiety    HTN (hypertension)    COPD (chronic obstructive pulmonary disease)    S/P total abdominal hysterectomy                                              -----------------------------------------------------------  MEDICATIONS:  STANDING MEDICATIONS  albuterol    90 MICROgram(s) HFA Inhaler 2 Puff(s) Inhalation four times a day  albuterol/ipratropium for Nebulization 3 milliLiter(s) Nebulizer every 6 hours  ALPRAZolam 0.5 milliGRAM(s) Oral at bedtime  amLODIPine   Tablet 5 milliGRAM(s) Oral daily  atorvastatin 10 milliGRAM(s) Oral at bedtime  azithromycin   Tablet 500 milliGRAM(s) Oral daily  budesonide 160 MICROgram(s)/formoterol 4.5 MICROgram(s) Inhaler 2 Puff(s) Inhalation two times a day  chlorhexidine 2% Cloths 1 Application(s) Topical <User Schedule>  dextrose 5%. 1000 milliLiter(s) IV Continuous <Continuous>  dextrose 5%. 1000 milliLiter(s) IV Continuous <Continuous>  dextrose 50% Injectable 12.5 Gram(s) IV Push once  dextrose 50% Injectable 25 Gram(s) IV Push once  dextrose 50% Injectable 25 Gram(s) IV Push once  glucagon  Injectable 1 milliGRAM(s) IntraMuscular once  guaifenesin/dextromethorphan Oral Liquid 10 milliLiter(s) Oral every 6 hours  heparin   Injectable 5000 Unit(s) SubCutaneous every 12 hours  insulin glargine Injectable (LANTUS) 10 Unit(s) SubCutaneous at bedtime  insulin lispro (ADMELOG) corrective regimen sliding scale   SubCutaneous three times a day before meals  ipratropium    for Nebulization 500 MICROGram(s) Nebulizer every 6 hours  melatonin 5 milliGRAM(s) Oral at bedtime  methylPREDNISolone sodium succinate Injectable 60 milliGRAM(s) IV Push every 24 hours    PRN MEDICATIONS  dextrose Oral Gel 15 Gram(s) Oral once PRN                                            ------------------------------------------------------------  VITAL SIGNS: Last 24 Hours  T(C): 36.4 (09-04-24 @ 05:23), Max: 36.4 (09-03-24 @ 14:00)  T(F): 97.5 (09-04-24 @ 05:23), Max: 97.6 (09-03-24 @ 14:00)  HR: 78 (09-04-24 @ 06:15) (73 - 79)  BP: 162/68 (09-04-24 @ 06:15) (146/69 - 177/67)  ABP: --  ABP(mean): --  RR: 18 (09-04-24 @ 05:23) (18 - 18)  SpO2: 94% (09-04-24 @ 05:23) (93% - 94%)                                           --------------------------------------------------------------  LABS:      LABS:                          15.1   13.26 )-----------( 188      ( 04 Sep 2024 08:27 )             48.9     09-04    141  |  96<L>  |  20  ----------------------------<  88  4.5   |  39<H>  |  <0.5<L>    Ca    9.3      04 Sep 2024 08:27  Mg     2.1     09-04          Urinalysis Basic - ( 04 Sep 2024 08:27 )    Color: x / Appearance: x / SG: x / pH: x  Gluc: 88 mg/dL / Ketone: x  / Bili: x / Urobili: x   Blood: x / Protein: x / Nitrite: x   Leuk Esterase: x / RBC: x / WBC x   Sq Epi: x / Non Sq Epi: x / Bacteria: x                                                --------------------------------------------------------------  PHYSICAL EXAM:  GENERAL: A&Ox3. laying in bed NAD   HEENT: atraumatic, normocephalic  LUNGS: minor wheezing on ausculation of anterior lung fields   HEART: S1/S2. No heaves or thrills  ABD: Soft, non-tender, non-distended.  EXT/NEURO: Strength, sensation and ROM grossly intact.  SKIN: No edema    PLAN:  #Acute Hypercapnic Respiratory Failure 2/2 COPD Exacerbation  - VBG: PCO2 88 --> 81 (08/29)  - CXR: hyperinflation without consolidation; repeat CXR 9/3 no gross changes   -s/p solumedrol and duonebs  - Procal, MRSA, Legionella, strep pending  - BiPAP qhs; on NC keep spO2 >88; on home O2 PRN  - c/w solumedrol 60 mg IV BID --> increased to 40mg TID  - c/w weaning O2  - c/w duonebs, symbicort, spiriva  - c/w azithromycin x 5 days -> end today 9/3  - robitussin for cough  - PT  - Repeat ABG    #hyperkalemia  - ECG 8/29 NSR  - low K diet  - CTM    #HTN  #HLD  #CAD s/p stents  - c/w Norvasc 5  - c/w Lipitor 10    #H/o Dysphagia & Zenker  - SLP consulted- cleared for soft bite sized diet  - F/u OP    #DM  - A1c 6.7  - monitor FS  - c/w ISS    # DVT prophylaxis: Heparin  # GI prophylaxis: pantoprazole  # Diet: Soft-bite sized   # Activity: IAT  # Code status: Full Code  # Disposition: pending placement

## 2024-09-04 NOTE — SWALLOW BEDSIDE ASSESSMENT ADULT - SWALLOW EVAL: PATIENT/FAMILY GOALS STATEMENT
Pt and Son at bedside were educated on recent MBS results and recs for thins w/chin tuck strategy. Pt and son report understanding Pt and Son at bedside were educated on diet recs of soft & bite w/mildly thick liquids. Pt and son report understanding.

## 2024-09-04 NOTE — SWALLOW BEDSIDE ASSESSMENT ADULT - CONSISTENCIES ADMINISTERED
thin liquid/mildly thick/soft & bite-sized
thin liquid/pureed/soft & bite-sized/regular solid
thin liquid/soft & bite-sized/regular solid

## 2024-09-04 NOTE — SWALLOW BEDSIDE ASSESSMENT ADULT - COMMENTS
Known ST services [facility] in 6/20/24 recs for easy to chew/thin liquids w/chin tuck
Known ST services [facility] in 6/20/24 recs for easy to chew/thin liquids w/chin tuck
Known ST services [facility] in July 2024 recc for easy to chew/thin liquids

## 2024-09-04 NOTE — PROGRESS NOTE ADULT - SUBJECTIVE AND OBJECTIVE BOX
DESTINY TONEY  [facility]-N 3A 020 A (SI-N 3A)      Patient was evaluated and examined  by bedside, feeling better today, decreased wheezing, no cough, no fever       REVIEW OF SYSTEMS: please see pertinent positives mentioned above, all other 12 ROS negative      T(C): , Max: 36.4 (09-03-24 @ 14:00)  HR: 78 (09-04-24 @ 06:15)  BP: 162/68 (09-04-24 @ 06:15)  RR: 18 (09-04-24 @ 05:23)  SpO2: 94% (09-04-24 @ 05:23)  CAPILLARY BLOOD GLUCOSE      POCT Blood Glucose.: 139 mg/dL (04 Sep 2024 11:43)  POCT Blood Glucose.: 96 mg/dL (04 Sep 2024 08:14)  POCT Blood Glucose.: 128 mg/dL (03 Sep 2024 20:57)  POCT Blood Glucose.: 113 mg/dL (03 Sep 2024 17:29)  POCT Blood Glucose.: 135 mg/dL (03 Sep 2024 12:02)      PHYSICAL EXAM:  General: NAD, AAOX3, patient is laying comfortably in bed  HEENT: AT, NC, Supple, NO JVD, NO CB  Lungs: decreased breath sounds B/L, mild expiratory wheezing, no rhonchi  CVS: normal S1, S2, RRR, NO M/G/R  Abdomen: soft, bowel sounds present, non-tender, non-distended  Extremities: no edema, no clubbing, no cyanosis, positive peripheral pulses b/l  Neuro: no acute focal neurological deficits  Skin: no rash, no ecchymosis      LAB  CBC  Date: 09-04-24 @ 08:27  Mean cell Okuvxvlcdu36.1  Mean cell Hemoglobin Conc30.9  Mean cell Volum 97.4  Platelet count-Automate 188  RBC Count 5.02  Red Cell Distrib Width12.7  WBC Count13.26  % Albumin, Urine--  Hematocrit 48.9  Hemoglobin 15.1  CBC  Date: 09-03-24 @ 09:08  Mean cell Zrialvfluw96.1  Mean cell Hemoglobin Conc30.5  Mean cell Volum 98.8  Platelet count-Automate 177  RBC Count 5.15  Red Cell Distrib Width12.9  WBC Count10.38  % Albumin, Urine--  Hematocrit 50.9  Hemoglobin 15.5  CBC  Date: 09-02-24 @ 08:44  Mean cell Dhemtpamjm67.2  Mean cell Hemoglobin Conc31.1  Mean cell Volum 97.0  Platelet count-Automate 207  RBC Count 5.07  Red Cell Distrib Width13.2  WBC Count10.67  % Albumin, Urine--  Hematocrit 49.2  Hemoglobin 15.3  CBC  Date: 09-01-24 @ 05:38  Mean cell Nqylmsmigl02.2  Mean cell Hemoglobin Conc30.6  Mean cell Volum 98.6  Platelet count-Automate 168  RBC Count 4.87  Red Cell Distrib Width13.2  WBC Count14.33  % Albumin, Urine--  Hematocrit 48.0  Hemoglobin 14.7  CBC  Date: 08-31-24 @ 05:00  Mean cell Snfkoeugin21.4  Mean cell Hemoglobin Conc31.5  Mean cell Volum 96.6  Platelet count-Automate 157  RBC Count 4.73  Red Cell Distrib Width13.1  WBC Count14.13  % Albumin, Urine--  Hematocrit 45.7  Hemoglobin 14.4  CBC  Date: 08-30-24 @ 05:32  Mean cell Sdzodrridz63.6  Mean cell Hemoglobin Conc31.7  Mean cell Volum 96.6  Platelet count-Automate 160  RBC Count 4.94  Red Cell Distrib Width13.2  WBC Count4.08  % Albumin, Urine--  Hematocrit 47.7  Hemoglobin 15.1  CBC  Date: 08-29-24 @ 15:15  Mean cell Koeyfmpdnk19.7  Mean cell Hemoglobin Conc31.3  Mean cell Volum 98.3  Platelet count-Automate 168  RBC Count 5.21  Red Cell Distrib Width13.8  WBC Count6.57  % Albumin, Urine--  Hematocrit 51.2  Hemoglobin 16.0    [facility]  09-04-24 @ 08:27  Blood Gas Arterial-Calcium,Ionized--  Blood Urea Nitrogen, Serum 20 mg/dL [10 - 20]  Carbon Dioxide, Serum39 mmol/L<H> [17 - 32]  Chloride, Serum96 mmol/L<L> [98 - 110]  Creatinie, Serum<0.5 mg/dL<L> [0.7 - 1.5]  Glucose, Serum88 mg/dL [70 - 99]  Potassium, Serum4.5 mmol/L [3.5 - 5.0]  Sodium, Serum 141 mmol/L [135 - 146]  [facility]  09-03-24 @ 09:08  Blood Gas Arterial-Calcium,Ionized--  Blood Urea Nitrogen, Serum 22 mg/dL<H> [10 - 20]  Carbon Dioxide, Serum42 mmol/L<HH> [17 - 32] [TYPE:(C=Critical, N=Notification, A=Abnormal) C  TESTS: CO2  DATE/TIME CALLED: 09/03/2024 10:49:47 EDT  CALLED TO: DR RANGEL  READ BACK (2 Patient Identifiers)(Y/N): Y  READ BACK VALUES (Y/N): Y  CALLED BY: JL]  Chloride, Serum99 mmol/L [98 - 110]  Creatinie, Serum0.5 mg/dL<L> [0.7 - 1.5]  Glucose, Serum69 mg/dL<L> [70 - 99]  Potassium, Serum4.9 mmol/L [3.5 - 5.0]  Sodium, Serum 147 mmol/L<H> [135 - 146]  [facility]  09-02-24 @ 08:44  Blood Gas Arterial-Calcium,Ionized--  Blood Urea Nitrogen, Serum 23 mg/dL<H> [10 - 20]  Carbon Dioxide, Serum39 mmol/L<H> [17 - 32]  Chloride, Zvkbl371 mmol/L [98 - 110]  Creatinie, Serum0.6 mg/dL<L> [0.7 - 1.5]  Glucose, Dfpgp292 mg/dL<H> [70 - 99]  Potassium, Serum4.6 mmol/L [3.5 - 5.0]  Sodium, Serum 147 mmol/L<H> [135 - 146]  [facility]  09-01-24 @ 05:38  Blood Gas Arterial-Calcium,Ionized--  Blood Urea Nitrogen, Serum 25 mg/dL<H> [10 - 20]  Carbon Dioxide, Serum36 mmol/L<H> [17 - 32]  Chloride, Enulv793 mmol/L [98 - 110]  Creatinie, Serum0.6 mg/dL<L> [0.7 - 1.5]  Glucose, Fvpen936 mg/dL<H> [70 - 99]  Potassium, Serum5.1 mmol/L<H> [3.5 - 5.0]  Sodium, Serum 142 mmol/L [135 - 146]  [facility]  08-31-24 @ 05:00  Blood Gas Arterial-Calcium,Ionized--  Blood Urea Nitrogen, Serum 30 mg/dL<H> [10 - 20]  Carbon Dioxide, Serum32 mmol/L [17 - 32]  Chloride, Jmnby958 mmol/L [98 - 110]  Creatinie, Serum0.6 mg/dL<L> [0.7 - 1.5]  Glucose, Hzhgv720 mg/dL<H> [70 - 99]  Potassium, Serum4.8 mmol/L [3.5 - 5.0]  Sodium, Serum 142 mmol/L [135 - 146]  BMP  08-30-24 @ 16:41  Blood Gas Arterial-Calcium,Ionized1.21 mmol/L [1.15 - 1.33]  Blood Urea Nitrogen, Serum --  Carbon Dioxide, Serum--  Chloride, Serum--  Creatinie, Serum--  Glucose, Serum--  Potassium, Serum--  Sodium, Serum --            Medications:  albuterol    90 MICROgram(s) HFA Inhaler 2 Puff(s) Inhalation four times a day  albuterol/ipratropium for Nebulization 3 milliLiter(s) Nebulizer every 6 hours  ALPRAZolam 0.5 milliGRAM(s) Oral at bedtime  amLODIPine   Tablet 5 milliGRAM(s) Oral daily  atorvastatin 10 milliGRAM(s) Oral at bedtime  budesonide 160 MICROgram(s)/formoterol 4.5 MICROgram(s) Inhaler 2 Puff(s) Inhalation two times a day  chlorhexidine 2% Cloths 1 Application(s) Topical <User Schedule>  dextrose 5%. 1000 milliLiter(s) IV Continuous <Continuous>  dextrose 5%. 1000 milliLiter(s) IV Continuous <Continuous>  dextrose 50% Injectable 25 Gram(s) IV Push once  dextrose 50% Injectable 12.5 Gram(s) IV Push once  dextrose 50% Injectable 25 Gram(s) IV Push once  dextrose Oral Gel 15 Gram(s) Oral once PRN  glucagon  Injectable 1 milliGRAM(s) IntraMuscular once  guaifenesin/dextromethorphan Oral Liquid 10 milliLiter(s) Oral every 6 hours  heparin   Injectable 5000 Unit(s) SubCutaneous every 12 hours  insulin glargine Injectable (LANTUS) 10 Unit(s) SubCutaneous at bedtime  insulin lispro (ADMELOG) corrective regimen sliding scale   SubCutaneous three times a day before meals  ipratropium    for Nebulization 500 MICROGram(s) Nebulizer every 6 hours  melatonin 5 milliGRAM(s) Oral at bedtime  methylPREDNISolone sodium succinate Injectable 40 milliGRAM(s) IV Push three times a day  pantoprazole    Tablet 40 milliGRAM(s) Oral before breakfast  sodium chloride 0.45%. 1000 milliLiter(s) IV Continuous <Continuous>        Assessment and Plan:  78F PMHx COPD on home o2 prn, HTN, CAD s/p stent 2012, zenker diverticulum here with acute on chronic hypoxic and hypercapnic resp failure.    #Acute on chronic respiratory failure with hypoxia and hypercapnia.   COPD exacerbation due to URI Parainfuenza infection   cxr clear  va duplex neg  rvp +parainfluenza  azithro for 5 days  IV solumedrol tx  bipap prn and  qhs  nc to keep spo2 >88; on home o2 prn  duoneb q6, symbicort.    Hyperkalemia- corrected post lokelma tx.   - monitor BMP     HTN -resumed on home regimen tx.       CAD (coronary artery disease)- stable, continue home regimen tx.     Zenker diverticulum.   ·  Plan: outpt f/u.     On deep vein thrombosis (DVT) prophylaxis.  subq hep.    Code status: DNR/DNI     #Progress Note Handoff: continue to taper  steroids once patient clinically improves , nebs, pulse ox monitoring, BMP in am, obtain routine ABG today  Family discussion: d/w pt at bedside  Disposition: home once medically stable    Total time spent to complete patient's bedside assessment, review medical chart, discuss medical plan of care with covering medical team was more than 35 minutes with >50% of time spent face to face with patient, discussion with patient/family and/or coordination of care .

## 2024-09-04 NOTE — SWALLOW BEDSIDE ASSESSMENT ADULT - NS SPL SWALLOW CLINIC TRIAL FT
Toleration observed
O2 sats dropped to 92-94 during PO trials, however pt. appeared to be breathing comfortable
Toleration for soft & bite sized w/mildly thick liquids.

## 2024-09-04 NOTE — SWALLOW BEDSIDE ASSESSMENT ADULT - SLP PERTINENT HISTORY OF CURRENT PROBLEM
78-year-old female past medical history of COPD not on home O2, current smoker, hypertension, hyperlipidemia, CAD status post stent 2019, hx of dysphagia and zenker diverticulum present for shortness of breath. to note that the patient was recently discharged in june for COPD exacerbation. history goes back to yesterday when the patient started having dizziness and shortness of breath. Pt's son confirmed that the patient is not very compliant with her medications especially her nebulizers. No fevers or chills reported. On examination patient is significantly wheezing. Admitted for COPD exacerbation. CXR is WFL

## 2024-09-04 NOTE — SWALLOW BEDSIDE ASSESSMENT ADULT - SWALLOW EVAL: RECOMMENDED FEEDING/EATING TECHNIQUES
maintain upright posture during/after eating for 30 mins/oral hygiene/position upright (90 degrees)/tuck chin
allow for swallow between intakes/small sips/bites
maintain upright posture during/after eating for 30 mins/oral hygiene/position upright (90 degrees)

## 2024-09-04 NOTE — SWALLOW BEDSIDE ASSESSMENT ADULT - PHARYNGEAL PHASE
Within functional limits
Within functional limits
w/ thin liquids, unable to implement chin tuck./Cough post oral intake

## 2024-09-05 LAB
ANION GAP SERPL CALC-SCNC: 8 MMOL/L — SIGNIFICANT CHANGE UP (ref 7–14)
BASOPHILS # BLD AUTO: 0.01 K/UL — SIGNIFICANT CHANGE UP (ref 0–0.2)
BASOPHILS NFR BLD AUTO: 0.1 % — SIGNIFICANT CHANGE UP (ref 0–1)
BUN SERPL-MCNC: 18 MG/DL — SIGNIFICANT CHANGE UP (ref 10–20)
CALCIUM SERPL-MCNC: 9.3 MG/DL — SIGNIFICANT CHANGE UP (ref 8.4–10.5)
CHLORIDE SERPL-SCNC: 95 MMOL/L — LOW (ref 98–110)
CO2 SERPL-SCNC: 39 MMOL/L — HIGH (ref 17–32)
CREAT SERPL-MCNC: 0.5 MG/DL — LOW (ref 0.7–1.5)
EGFR: 95 ML/MIN/1.73M2 — SIGNIFICANT CHANGE UP
EOSINOPHIL # BLD AUTO: 0 K/UL — SIGNIFICANT CHANGE UP (ref 0–0.7)
EOSINOPHIL NFR BLD AUTO: 0 % — SIGNIFICANT CHANGE UP (ref 0–8)
GLUCOSE BLDC GLUCOMTR-MCNC: 139 MG/DL — HIGH (ref 70–99)
GLUCOSE BLDC GLUCOMTR-MCNC: 206 MG/DL — HIGH (ref 70–99)
GLUCOSE BLDC GLUCOMTR-MCNC: 230 MG/DL — HIGH (ref 70–99)
GLUCOSE BLDC GLUCOMTR-MCNC: 81 MG/DL — SIGNIFICANT CHANGE UP (ref 70–99)
GLUCOSE SERPL-MCNC: 136 MG/DL — HIGH (ref 70–99)
HCT VFR BLD CALC: 45.6 % — SIGNIFICANT CHANGE UP (ref 37–47)
HGB BLD-MCNC: 14.8 G/DL — SIGNIFICANT CHANGE UP (ref 12–16)
IMM GRANULOCYTES NFR BLD AUTO: 0.7 % — HIGH (ref 0.1–0.3)
LYMPHOCYTES # BLD AUTO: 0.57 K/UL — LOW (ref 1.2–3.4)
LYMPHOCYTES # BLD AUTO: 5.3 % — LOW (ref 20.5–51.1)
MAGNESIUM SERPL-MCNC: 2.3 MG/DL — SIGNIFICANT CHANGE UP (ref 1.8–2.4)
MCHC RBC-ENTMCNC: 30.6 PG — SIGNIFICANT CHANGE UP (ref 27–31)
MCHC RBC-ENTMCNC: 32.5 G/DL — SIGNIFICANT CHANGE UP (ref 32–37)
MCV RBC AUTO: 94.4 FL — SIGNIFICANT CHANGE UP (ref 81–99)
MONOCYTES # BLD AUTO: 0.36 K/UL — SIGNIFICANT CHANGE UP (ref 0.1–0.6)
MONOCYTES NFR BLD AUTO: 3.3 % — SIGNIFICANT CHANGE UP (ref 1.7–9.3)
NEUTROPHILS # BLD AUTO: 9.74 K/UL — HIGH (ref 1.4–6.5)
NEUTROPHILS NFR BLD AUTO: 90.6 % — HIGH (ref 42.2–75.2)
NRBC # BLD: 0 /100 WBCS — SIGNIFICANT CHANGE UP (ref 0–0)
PLATELET # BLD AUTO: 197 K/UL — SIGNIFICANT CHANGE UP (ref 130–400)
PMV BLD: 10.7 FL — HIGH (ref 7.4–10.4)
POTASSIUM SERPL-MCNC: 5 MMOL/L — SIGNIFICANT CHANGE UP (ref 3.5–5)
POTASSIUM SERPL-SCNC: 5 MMOL/L — SIGNIFICANT CHANGE UP (ref 3.5–5)
RBC # BLD: 4.83 M/UL — SIGNIFICANT CHANGE UP (ref 4.2–5.4)
RBC # FLD: 12.3 % — SIGNIFICANT CHANGE UP (ref 11.5–14.5)
SODIUM SERPL-SCNC: 142 MMOL/L — SIGNIFICANT CHANGE UP (ref 135–146)
WBC # BLD: 10.76 K/UL — SIGNIFICANT CHANGE UP (ref 4.8–10.8)
WBC # FLD AUTO: 10.76 K/UL — SIGNIFICANT CHANGE UP (ref 4.8–10.8)

## 2024-09-05 PROCEDURE — 99232 SBSQ HOSP IP/OBS MODERATE 35: CPT

## 2024-09-05 RX ORDER — METHYLPREDNISOLONE 4 MG
40 TABLET ORAL
Refills: 0 | Status: DISCONTINUED | OUTPATIENT
Start: 2024-09-05 | End: 2024-09-06

## 2024-09-05 RX ORDER — INSULIN GLARGINE 100 [IU]/ML
8 INJECTION, SOLUTION SUBCUTANEOUS AT BEDTIME
Refills: 0 | Status: DISCONTINUED | OUTPATIENT
Start: 2024-09-05 | End: 2024-09-06

## 2024-09-05 RX ADMIN — INSULIN GLARGINE 8 UNIT(S): 100 INJECTION, SOLUTION SUBCUTANEOUS at 21:41

## 2024-09-05 RX ADMIN — IPRATROPIUM BROMIDE AND ALBUTEROL SULFATE 3 MILLILITER(S): .5; 3 SOLUTION RESPIRATORY (INHALATION) at 20:05

## 2024-09-05 RX ADMIN — Medication 10 MILLILITER(S): at 23:07

## 2024-09-05 RX ADMIN — CHLORHEXIDINE GLUCONATE 1 APPLICATION(S): 40 SOLUTION TOPICAL at 05:12

## 2024-09-05 RX ADMIN — Medication 5000 UNIT(S): at 05:11

## 2024-09-05 RX ADMIN — Medication 5000 UNIT(S): at 17:27

## 2024-09-05 RX ADMIN — Medication 5 MILLIGRAM(S): at 21:17

## 2024-09-05 RX ADMIN — Medication 10 MILLILITER(S): at 11:12

## 2024-09-05 RX ADMIN — AMLODIPINE BESYLATE 5 MILLIGRAM(S): 10 TABLET ORAL at 05:11

## 2024-09-05 RX ADMIN — Medication 40 MILLIGRAM(S): at 17:27

## 2024-09-05 RX ADMIN — Medication 40 MILLIGRAM(S): at 05:11

## 2024-09-05 RX ADMIN — Medication 4: at 11:30

## 2024-09-05 RX ADMIN — IPRATROPIUM BROMIDE AND ALBUTEROL SULFATE 3 MILLILITER(S): .5; 3 SOLUTION RESPIRATORY (INHALATION) at 14:41

## 2024-09-05 RX ADMIN — Medication 10 MILLIGRAM(S): at 21:17

## 2024-09-05 RX ADMIN — Medication 0.5 MILLIGRAM(S): at 21:17

## 2024-09-05 RX ADMIN — Medication 10 MILLILITER(S): at 17:27

## 2024-09-05 RX ADMIN — Medication 10 MILLILITER(S): at 05:12

## 2024-09-05 NOTE — PROGRESS NOTE ADULT - SUBJECTIVE AND OBJECTIVE BOX
DESTINY TONEY 78y Female  MRN#: [id number]     Hospital Day: 7d    Pt is currently admitted with the primary diagnosis of COPD exacerbation    Overnight events   -No major overnight events    Pt doing clinically better today. No confusion during exam. Denies CP, SOB  refused Bipap overnight                                            ----------------------------------------------------------  OBJECTIVE  PAST MEDICAL & SURGICAL HISTORY  CVA (cerebral vascular accident)    Anxiety    HTN (hypertension)    COPD (chronic obstructive pulmonary disease)    S/P total abdominal hysterectomy                                              -----------------------------------------------------------  MEDICATIONS:  STANDING MEDICATIONS  albuterol    90 MICROgram(s) HFA Inhaler 2 Puff(s) Inhalation four times a day  albuterol/ipratropium for Nebulization 3 milliLiter(s) Nebulizer every 6 hours  ALPRAZolam 0.5 milliGRAM(s) Oral at bedtime  amLODIPine   Tablet 5 milliGRAM(s) Oral daily  atorvastatin 10 milliGRAM(s) Oral at bedtime  azithromycin   Tablet 500 milliGRAM(s) Oral daily  budesonide 160 MICROgram(s)/formoterol 4.5 MICROgram(s) Inhaler 2 Puff(s) Inhalation two times a day  chlorhexidine 2% Cloths 1 Application(s) Topical <User Schedule>  dextrose 5%. 1000 milliLiter(s) IV Continuous <Continuous>  dextrose 5%. 1000 milliLiter(s) IV Continuous <Continuous>  dextrose 50% Injectable 12.5 Gram(s) IV Push once  dextrose 50% Injectable 25 Gram(s) IV Push once  dextrose 50% Injectable 25 Gram(s) IV Push once  glucagon  Injectable 1 milliGRAM(s) IntraMuscular once  guaifenesin/dextromethorphan Oral Liquid 10 milliLiter(s) Oral every 6 hours  heparin   Injectable 5000 Unit(s) SubCutaneous every 12 hours  insulin glargine Injectable (LANTUS) 10 Unit(s) SubCutaneous at bedtime  insulin lispro (ADMELOG) corrective regimen sliding scale   SubCutaneous three times a day before meals  ipratropium    for Nebulization 500 MICROGram(s) Nebulizer every 6 hours  melatonin 5 milliGRAM(s) Oral at bedtime  methylPREDNISolone sodium succinate Injectable 60 milliGRAM(s) IV Push every 24 hours    PRN MEDICATIONS  dextrose Oral Gel 15 Gram(s) Oral once PRN                                            ------------------------------------------------------------  VITAL SIGNS: Last 24 Hours    LABS:                          14.8   10.76 )-----------( 197      ( 05 Sep 2024 08:29 )             45.6     09-05    142  |  95<L>  |  18  ----------------------------<  136<H>  5.0   |  39<H>  |  0.5<L>    Ca    9.3      05 Sep 2024 08:29  Mg     2.3     09-05          Urinalysis Basic - ( 05 Sep 2024 08:29 )    Color: x / Appearance: x / SG: x / pH: x  Gluc: 136 mg/dL / Ketone: x  / Bili: x / Urobili: x   Blood: x / Protein: x / Nitrite: x   Leuk Esterase: x / RBC: x / WBC x   Sq Epi: x / Non Sq Epi: x / Bacteria: x      T(C): 36.7 (09-05-24 @ 12:23), Max: 36.8 (09-05-24 @ 05:17)  T(F): 98 (09-05-24 @ 12:23), Max: 98.2 (09-05-24 @ 05:17)  HR: 61 (09-05-24 @ 12:23) (61 - 73)  BP: 138/64 (09-05-24 @ 12:23) (138/64 - 167/76)  ABP: --  ABP(mean): --  RR: 19 (09-05-24 @ 05:17) (18 - 19)  SpO2: 98% (09-05-24 @ 12:23) (92% - 98%)                                             --------------------------------------------------------------  LABS:                                                  --------------------------------------------------------------  PHYSICAL EXAM:  GENERAL: A&Ox4. laying in bed NAD   HEENT: atraumatic, normocephalic  LUNGS: minor wheezing on ausculation of anterior lung fields. shallow breaths  HEART: S1/S2. No heaves or thrills  ABD: Soft, non-tender, non-distended.  EXT/NEURO: Strength, sensation and ROM grossly intact.  SKIN: No edema    PLAN:  #Acute Hypercapnic Respiratory Failure 2/2 COPD Exacerbation  - VBG: PCO2 88 --> 81 (08/29)  - CXR: hyperinflation without consolidation; repeat CXR 9/3 no gross changes  - Procal normal 0.10 8/29  - BiPAP qhs; on NC keep spO2 >88; on home O2 PRN  - c/w solumedrol 60 mg IV BID --> increased to 40mg TID --> 40 BID 9/5 --> consider tapering to 40 qd  - c/w weaning O2 - on 2LNC sat >88%  - c/w duonebs, symbicort, spiriva  - s/p azithromycin x 5 days  - robitussin for cough  - PT  - Repeat ABG if patient becomes confused or clinically deteriorates     #hyperkalemia  - ECG 8/29 NSR  - low K diet  - CTM CMP    #HTN  #HLD  #CAD s/p stents  - c/w Norvasc 5  - c/w Lipitor 10    #H/o Dysphagia & Zenker  - SLP consulted- cleared for soft bite sized diet  - F/u OP    #DM  - A1c 6.7  - monitor FS  - c/w ISS    # DVT prophylaxis: Heparin  # GI prophylaxis: pantoprazole  # Diet: Soft-bite sized   # Activity: IAT  # Code status: Full Code  # Disposition: pending clinical improvement, tapering steroids

## 2024-09-05 NOTE — PROGRESS NOTE ADULT - PROVIDER SPECIALTY LIST ADULT
Critical Care
Hospitalist
Hospitalist
Internal Medicine
Pulmonology
Internal Medicine

## 2024-09-05 NOTE — PROGRESS NOTE ADULT - SUBJECTIVE AND OBJECTIVE BOX
DESTINY TONEY  [facility]-N 3A 020 A (SI-N 3A)      Patient was evaluated and examined  by bedside, clinically improved with no dyspnea at rest, no cough, remains afebrile, on 2 L via NC sat 92%      REVIEW OF SYSTEMS:  please see pertinent positives mentioned above, all other 12 ROS negative      T(C): , Max: 36.8 (09-05-24 @ 05:17)  HR: 61 (09-05-24 @ 12:23)  BP: 138/64 (09-05-24 @ 12:23)  RR: 19 (09-05-24 @ 05:17)  SpO2: 98% (09-05-24 @ 12:23)  CAPILLARY BLOOD GLUCOSE      POCT Blood Glucose.: 206 mg/dL (05 Sep 2024 11:23)  POCT Blood Glucose.: 139 mg/dL (05 Sep 2024 07:37)  POCT Blood Glucose.: 183 mg/dL (04 Sep 2024 21:16)  POCT Blood Glucose.: 219 mg/dL (04 Sep 2024 20:50)  POCT Blood Glucose.: 149 mg/dL (04 Sep 2024 17:28)      PHYSICAL EXAM:  General: NAD, AAOX3, patient is laying comfortably in bed  HEENT: AT, NC, Supple, NO JVD, NO CB  Lungs: decreased breath sounds  B/L, minimal expiratory  wheezing, no rhonchi  CVS: normal S1, S2, RRR, NO M/G/R  Abdomen: soft, bowel sounds present, non-tender, non-distended  Extremities: no edema, no clubbing, no cyanosis, positive peripheral pulses b/l  Neuro: no acute focal neurological deficits  Skin: no rash, no ecchymosis      LAB  CBC  Date: 09-05-24 @ 08:29  Mean cell Tagrnylpsd21.6  Mean cell Hemoglobin Conc32.5  Mean cell Volum 94.4  Platelet count-Automate 197  RBC Count 4.83  Red Cell Distrib Width12.3  WBC Count10.76  % Albumin, Urine--  Hematocrit 45.6  Hemoglobin 14.8  CBC  Date: 09-04-24 @ 08:27  Mean cell Ybeqkuehvl86.1  Mean cell Hemoglobin Conc30.9  Mean cell Volum 97.4  Platelet count-Automate 188  RBC Count 5.02  Red Cell Distrib Width12.7  WBC Count13.26  % Albumin, Urine--  Hematocrit 48.9  Hemoglobin 15.1  CBC  Date: 09-03-24 @ 09:08  Mean cell Irgvbmcfan19.1  Mean cell Hemoglobin Conc30.5  Mean cell Volum 98.8  Platelet count-Automate 177  RBC Count 5.15  Red Cell Distrib Width12.9  WBC Count10.38  % Albumin, Urine--  Hematocrit 50.9  Hemoglobin 15.5  CBC  Date: 09-02-24 @ 08:44  Mean cell Yzvfoiutct93.2  Mean cell Hemoglobin Conc31.1  Mean cell Volum 97.0  Platelet count-Automate 207  RBC Count 5.07  Red Cell Distrib Width13.2  WBC Count10.67  % Albumin, Urine--  Hematocrit 49.2  Hemoglobin 15.3  CBC  Date: 09-01-24 @ 05:38  Mean cell Kvzksaxgnu01.2  Mean cell Hemoglobin Conc30.6  Mean cell Volum 98.6  Platelet count-Automate 168  RBC Count 4.87  Red Cell Distrib Width13.2  WBC Count14.33  % Albumin, Urine--  Hematocrit 48.0  Hemoglobin 14.7  CBC  Date: 08-31-24 @ 05:00  Mean cell Jarjhqrxfm58.4  Mean cell Hemoglobin Conc31.5  Mean cell Volum 96.6  Platelet count-Automate 157  RBC Count 4.73  Red Cell Distrib Width13.1  WBC Count14.13  % Albumin, Urine--  Hematocrit 45.7  Hemoglobin 14.4  CBC  Date: 08-30-24 @ 05:32  Mean cell Zlrslkylyr34.6  Mean cell Hemoglobin Conc31.7  Mean cell Volum 96.6  Platelet count-Automate 160  RBC Count 4.94  Red Cell Distrib Width13.2  WBC Count4.08  % Albumin, Urine--  Hematocrit 47.7  Hemoglobin 15.1  CBC  Date: 08-29-24 @ 15:15  Mean cell Ohenyryxwu84.7  Mean cell Hemoglobin Conc31.3  Mean cell Volum 98.3  Platelet count-Automate 168  RBC Count 5.21  Red Cell Distrib Width13.8  WBC Count6.57  % Albumin, Urine--  Hematocrit 51.2  Hemoglobin 16.0    BMP  09-05-24 @ 08:29  Blood Gas Arterial-Calcium,Ionized--  Blood Urea Nitrogen, Serum 18 mg/dL [10 - 20]  Carbon Dioxide, Serum39 mmol/L<H> [17 - 32]  Chloride, Serum95 mmol/L<L> [98 - 110]  Creatinie, Serum0.5 mg/dL<L> [0.7 - 1.5]  Glucose, Rnfjm274 mg/dL<H> [70 - 99]  Potassium, Serum5.0 mmol/L [3.5 - 5.0]  Sodium, Serum 142 mmol/L [135 - 146]  [facility]  09-04-24 @ 14:31  Blood Gas Arterial-Calcium,Ionized1.15 mmol/L [1.15 - 1.33]  Blood Urea Nitrogen, Serum --  Carbon Dioxide, Serum--  Chloride, Serum--  Creatinie, Serum--  Glucose, Serum--  Potassium, Serum--  Sodium, Serum --  [facility]  09-04-24 @ 08:27  Blood Gas Arterial-Calcium,Ionized--  Blood Urea Nitrogen, Serum 20 mg/dL [10 - 20]  Carbon Dioxide, Serum39 mmol/L<H> [17 - 32]  Chloride, Serum96 mmol/L<L> [98 - 110]  Creatinie, Serum<0.5 mg/dL<L> [0.7 - 1.5]  Glucose, Serum88 mg/dL [70 - 99]  Potassium, Serum4.5 mmol/L [3.5 - 5.0]  Sodium, Serum 141 mmol/L [135 - 146]  [facility]  09-03-24 @ 09:08  Blood Gas Arterial-Calcium,Ionized--  Blood Urea Nitrogen, Serum 22 mg/dL<H> [10 - 20]  Carbon Dioxide, Serum42 mmol/L<HH> [17 - 32] [TYPE:(C=Critical, N=Notification, A=Abnormal) C  TESTS: CO2  DATE/TIME CALLED: 09/03/2024 10:49:47 EDT  CALLED TO: DR RANGEL  READ BACK (2 Patient Identifiers)(Y/N): Y  READ BACK VALUES (Y/N): Y  CALLED BY: JL]  Chloride, Serum99 mmol/L [98 - 110]  Creatinie, Serum0.5 mg/dL<L> [0.7 - 1.5]  Glucose, Serum69 mg/dL<L> [70 - 99]  Potassium, Serum4.9 mmol/L [3.5 - 5.0]  Sodium, Serum 147 mmol/L<H> [135 - 146]  [facility]  09-02-24 @ 08:44  Blood Gas Arterial-Calcium,Ionized--  Blood Urea Nitrogen, Serum 23 mg/dL<H> [10 - 20]  Carbon Dioxide, Serum39 mmol/L<H> [17 - 32]  Chloride, Cxtqp306 mmol/L [98 - 110]  Creatinie, Serum0.6 mg/dL<L> [0.7 - 1.5]  Glucose, Amxjf286 mg/dL<H> [70 - 99]  Potassium, Serum4.6 mmol/L [3.5 - 5.0]  Sodium, Serum 147 mmol/L<H> [135 - 146]  [facility]  09-01-24 @ 05:38  Blood Gas Arterial-Calcium,Ionized--  Blood Urea Nitrogen, Serum 25 mg/dL<H> [10 - 20]  Carbon Dioxide, Serum36 mmol/L<H> [17 - 32]  Chloride, Yicme559 mmol/L [98 - 110]  Creatinie, Serum0.6 mg/dL<L> [0.7 - 1.5]  Glucose, Xbejj742 mg/dL<H> [70 - 99]  Potassium, Serum5.1 mmol/L<H> [3.5 - 5.0]  Sodium, Serum 142 mmol/L [135 - 146]            Medications:  albuterol    90 MICROgram(s) HFA Inhaler 2 Puff(s) Inhalation every 6 hours PRN  albuterol/ipratropium for Nebulization 3 milliLiter(s) Nebulizer every 6 hours  ALPRAZolam 0.5 milliGRAM(s) Oral at bedtime  amLODIPine   Tablet 5 milliGRAM(s) Oral daily  atorvastatin 10 milliGRAM(s) Oral at bedtime  budesonide 160 MICROgram(s)/formoterol 4.5 MICROgram(s) Inhaler 2 Puff(s) Inhalation two times a day  chlorhexidine 2% Cloths 1 Application(s) Topical <User Schedule>  dextrose 5%. 1000 milliLiter(s) IV Continuous <Continuous>  dextrose 5%. 1000 milliLiter(s) IV Continuous <Continuous>  dextrose 50% Injectable 25 Gram(s) IV Push once  dextrose 50% Injectable 12.5 Gram(s) IV Push once  dextrose 50% Injectable 25 Gram(s) IV Push once  dextrose Oral Gel 15 Gram(s) Oral once PRN  glucagon  Injectable 1 milliGRAM(s) IntraMuscular once  guaifenesin/dextromethorphan Oral Liquid 10 milliLiter(s) Oral every 6 hours  heparin   Injectable 5000 Unit(s) SubCutaneous every 12 hours  insulin glargine Injectable (LANTUS) 8 Unit(s) SubCutaneous at bedtime  insulin lispro (ADMELOG) corrective regimen sliding scale   SubCutaneous three times a day before meals  ipratropium    for Nebulization 500 MICROGram(s) Nebulizer every 6 hours  melatonin 5 milliGRAM(s) Oral at bedtime  methylPREDNISolone sodium succinate Injectable 40 milliGRAM(s) IV Push two times a day  pantoprazole    Tablet 40 milliGRAM(s) Oral before breakfast        Assessment and Plan:  78F PMHx COPD on home o2 prn, HTN, CAD s/p stent 2012, zenker diverticulum here with acute on chronic hypoxic and hypercapnic resp failure.    #Acute on chronic respiratory failure with hypoxia and hypercapnia. - currently on 2 L via NC sat 92% at rest  COPD exacerbation due to URI Parainfuenza infection   cxr clear  va duplex neg  rvp +parainfluenza  azithro for 5 days  IV solumedrol 40 mg twice daily  tx  bipap prn and  qhs  nc to keep spo2 >88; on home o2 prn  duoneb q6, symbicort.    Hyperkalemia- corrected post lokelma tx.   - monitor BMP     HTN -resumed on home regimen tx.       CAD (coronary artery disease)- stable, continue home regimen tx.     Zenker diverticulum.   ·  Plan: outpt f/u.     On deep vein thrombosis (DVT) prophylaxis.  subq hep.    Code status: DNR/DNI     #Progress Note Handoff:  taper  steroids , continue nebs, pulse ox monitoring, BMP in am,   Family discussion: d/w pt at bedside  Disposition: As per Family request patient will be d/c to  [facility] once medically stable     Total time spent to complete patient's bedside assessment, review medical chart, discuss medical plan of care with covering medical team was more than 35 minutes with >50% of time spent face to face with patient, discussion with patient/family and/or coordination of care .

## 2024-09-06 ENCOUNTER — TRANSCRIPTION ENCOUNTER (OUTPATIENT)
Age: 79
End: 2024-09-06

## 2024-09-06 VITALS
OXYGEN SATURATION: 93 % | HEART RATE: 70 BPM | RESPIRATION RATE: 18 BRPM | SYSTOLIC BLOOD PRESSURE: 146 MMHG | TEMPERATURE: 97 F | DIASTOLIC BLOOD PRESSURE: 63 MMHG

## 2024-09-06 LAB
ANION GAP SERPL CALC-SCNC: 10 MMOL/L — SIGNIFICANT CHANGE UP (ref 7–14)
BASOPHILS # BLD AUTO: 0.03 K/UL — SIGNIFICANT CHANGE UP (ref 0–0.2)
BASOPHILS NFR BLD AUTO: 0.2 % — SIGNIFICANT CHANGE UP (ref 0–1)
BUN SERPL-MCNC: 20 MG/DL — SIGNIFICANT CHANGE UP (ref 10–20)
CALCIUM SERPL-MCNC: 9.3 MG/DL — SIGNIFICANT CHANGE UP (ref 8.4–10.4)
CHLORIDE SERPL-SCNC: 95 MMOL/L — LOW (ref 98–110)
CO2 SERPL-SCNC: 36 MMOL/L — HIGH (ref 17–32)
CREAT SERPL-MCNC: 0.5 MG/DL — LOW (ref 0.7–1.5)
EGFR: 95 ML/MIN/1.73M2 — SIGNIFICANT CHANGE UP
EOSINOPHIL # BLD AUTO: 0.01 K/UL — SIGNIFICANT CHANGE UP (ref 0–0.7)
EOSINOPHIL NFR BLD AUTO: 0.1 % — SIGNIFICANT CHANGE UP (ref 0–8)
GLUCOSE BLDC GLUCOMTR-MCNC: 103 MG/DL — HIGH (ref 70–99)
GLUCOSE SERPL-MCNC: 92 MG/DL — SIGNIFICANT CHANGE UP (ref 70–99)
HCT VFR BLD CALC: 45.2 % — SIGNIFICANT CHANGE UP (ref 37–47)
HGB BLD-MCNC: 14.4 G/DL — SIGNIFICANT CHANGE UP (ref 12–16)
IMM GRANULOCYTES NFR BLD AUTO: 0.7 % — HIGH (ref 0.1–0.3)
LYMPHOCYTES # BLD AUTO: 1.03 K/UL — LOW (ref 1.2–3.4)
LYMPHOCYTES # BLD AUTO: 8.4 % — LOW (ref 20.5–51.1)
MAGNESIUM SERPL-MCNC: 2.2 MG/DL — SIGNIFICANT CHANGE UP (ref 1.8–2.4)
MCHC RBC-ENTMCNC: 30.7 PG — SIGNIFICANT CHANGE UP (ref 27–31)
MCHC RBC-ENTMCNC: 31.9 G/DL — LOW (ref 32–37)
MCV RBC AUTO: 96.4 FL — SIGNIFICANT CHANGE UP (ref 81–99)
MONOCYTES # BLD AUTO: 0.71 K/UL — HIGH (ref 0.1–0.6)
MONOCYTES NFR BLD AUTO: 5.8 % — SIGNIFICANT CHANGE UP (ref 1.7–9.3)
NEUTROPHILS # BLD AUTO: 10.39 K/UL — HIGH (ref 1.4–6.5)
NEUTROPHILS NFR BLD AUTO: 84.8 % — HIGH (ref 42.2–75.2)
NRBC # BLD: 0 /100 WBCS — SIGNIFICANT CHANGE UP (ref 0–0)
PLATELET # BLD AUTO: 185 K/UL — SIGNIFICANT CHANGE UP (ref 130–400)
PMV BLD: 11.4 FL — HIGH (ref 7.4–10.4)
POTASSIUM SERPL-MCNC: 4.4 MMOL/L — SIGNIFICANT CHANGE UP (ref 3.5–5)
POTASSIUM SERPL-SCNC: 4.4 MMOL/L — SIGNIFICANT CHANGE UP (ref 3.5–5)
RBC # BLD: 4.69 M/UL — SIGNIFICANT CHANGE UP (ref 4.2–5.4)
RBC # FLD: 12.4 % — SIGNIFICANT CHANGE UP (ref 11.5–14.5)
SODIUM SERPL-SCNC: 141 MMOL/L — SIGNIFICANT CHANGE UP (ref 135–146)
WBC # BLD: 12.25 K/UL — HIGH (ref 4.8–10.8)
WBC # FLD AUTO: 12.25 K/UL — HIGH (ref 4.8–10.8)

## 2024-09-06 PROCEDURE — 99239 HOSP IP/OBS DSCHRG MGMT >30: CPT

## 2024-09-06 RX ADMIN — Medication 40 MILLIGRAM(S): at 06:02

## 2024-09-06 RX ADMIN — AMLODIPINE BESYLATE 5 MILLIGRAM(S): 10 TABLET ORAL at 06:02

## 2024-09-06 RX ADMIN — Medication 5000 UNIT(S): at 06:02

## 2024-09-06 RX ADMIN — Medication 10 MILLILITER(S): at 06:02

## 2024-09-06 RX ADMIN — Medication 10 MILLILITER(S): at 11:37

## 2024-09-06 RX ADMIN — CHLORHEXIDINE GLUCONATE 1 APPLICATION(S): 40 SOLUTION TOPICAL at 06:39

## 2024-09-06 NOTE — DISCHARGE NOTE PROVIDER - ATTENDING DISCHARGE PHYSICAL EXAMINATION:
GEN: No acute distress  HEENT: normocephalic, atraumatic, anicteric  LUNGS: b/l breath sounds present, clear to auscultation bilaterally   HEART:  Regular rate & regular rhythm  ABD: Soft, non-tender, non-distended. Bowel sounds present  EXT: warm   NEURO: awake, no new focal deficits

## 2024-09-06 NOTE — DISCHARGE NOTE NURSING/CASE MANAGEMENT/SOCIAL WORK - PATIENT PORTAL LINK FT
You can access the FollowMyHealth Patient Portal offered by [facility] by registering at the following website: [email or web address]. By joining [employer]’s FollowMyHealth portal, you will also be able to view your health information using other applications (apps) compatible with our system.

## 2024-09-06 NOTE — DISCHARGE NOTE PROVIDER - HOSPITAL COURSE
78-year-old female past medical history of COPD not on home O2, current smoker, hypertension, hyperlipidemia, CAD status post stent 2019, hx of dysphagia and zenker diverticulum present for shortness of breath. to note that the patient was recently discharged in june for COPD exacerbation. history goes back to yesterday when the patient started having dizziness and shorntess of breath,. during my examination i removed the BiPAP to speak and see how she would tolerate  off BiPAP the patient desaturated over a duration of 2 mintues to low 80s, BiPAP was placed back on and the patient's saturation got better. Patient denies any CP, headache, urinary symptoms. I spoke to the son and he confirmed that the patient is not very compliant with her medications especially her nebulizers. No fevers or chills reported. On examination patient is significantly wheezing. PAtient is admitted for COPD excacerbation ;    Blood work noted for Trop 14, FU repeat   16:10 - VBG - pH: 7.21  | pCO2: 81    | pO2: 43    | Lactate: 1.5    14:47 - VBG - pH: 7.18  | pCO2: 88    | pO2: 55    | Lactate: 1.4      CXR showed hyperinflated lungs otherwise unremarkable     In Ed the patient was given Duonebs and solumedrol     ICU Vital Signs Last 24 Hrs  T(C): 36.8 (29 Aug 2024 14:28), Max: 36.8 (29 Aug 2024 14:28)  T(F): 98.3 (29 Aug 2024 14:28), Max: 98.3 (29 Aug 2024 14:28)  HR: 111 (29 Aug 2024 14:28) (111 - 111)  BP: 152/90 (29 Aug 2024 14:28) (152/90 - 152/90)  BP(mean): --  ABP: --  ABP(mean): --  RR: 20 (29 Aug 2024 14:28) (20 - 20)  SpO2: 98% (29 Aug 2024 14:28) (98% - 98%)    O2 Parameters below as of 29 Aug 2024 14:28  Patient On (Oxygen Delivery Method): room air    #Acute Hypercapnic Respiratory Failure 2/2 COPD Exacerbation  - VBG: PCO2 88 --> 81 (08/29)  - CXR: hyperinflation without consolidation; repeat CXR 9/3 no gross changes  - Procal normal 0.10 8/29  - BiPAP qhs; on NC keep spO2 >88; on home O2 PRN  - c/w solumedrol 60 mg IV BID --> increased to 40mg TID --> 40 BID 9/5 --> consider tapering to 40 qd  - c/w weaning O2 - on 2LNC sat >88%  - c/w duonebs, symbicort, spiriva  - s/p azithromycin x 5 days  - robitussin for cough  - PT, able to ambulate with walker    #hyperkalemia  - ECG 8/29 NSR  - low K diet  - CTM CMP    #HTN  #HLD  #CAD s/p stents  - c/w Norvasc 5  - c/w Lipitor 10    #H/o Dysphagia & Zenker  - SLP consulted- cleared for soft bite sized diet  - F/u OP    #DM  - A1c 6.7  - monitor FS  - c/w ISS    # DVT prophylaxis: Heparin  # GI prophylaxis: pantoprazole  # Diet: Soft-bite sized   # Activity: IAT  # Code status: Full Code  # Disposition: pending clinical improvement, tapering steroids    Patient has been able to breathe without distress, O2 sat 95-97% on 2L O2 NC. Patient is able to ambulate with walker during  PT assessment and has been hemodynamically stable. She has some cough, which is her baseline given her history of   COPD and smoking.    Discussion of discharge plan of care, including discharge diagnoses, medication reconciliation, and follow-ups, was   conducted with Dr. Burgos on 8/6/24, and discharge was approved. HPI: 78-year-old female past medical history of COPD not on home O2, current smoker, hypertension, hyperlipidemia, CAD status post stent 2019, hx of dysphagia and zenker diverticulum present for shortness of breath. to note that the patient was recently discharged in june for COPD exacerbation. history goes back to yesterday when the patient started having dizziness and shortness of breath,. during my examination i removed the BiPAP to speak and see how she would tolerate  off BiPAP the patient desaturated over a duration of 2 mintues to low 80s, BiPAP was placed back on and the patient's saturation got better. Patient denies any CP, headache, urinary symptoms. I spoke to the son and he confirmed that the patient is not very compliant with her medications especially her nebulizers. No fevers or chills reported. On examination patient is significantly wheezing. Patient is admitted for COPD excacerbation ;    Blood work noted for Trop 14, FU repeat   16:10 - VBG - pH: 7.21  | pCO2: 81    | pO2: 43    | Lactate: 1.5    14:47 - VBG - pH: 7.18  | pCO2: 88    | pO2: 55    | Lactate: 1.4      CXR showed hyperinflated lungs otherwise unremarkable     In Ed the patient was given Duonebs and solumedrol     ICU Vital Signs Last 24 Hrs  T(C): 36.8 (29 Aug 2024 14:28), Max: 36.8 (29 Aug 2024 14:28)  T(F): 98.3 (29 Aug 2024 14:28), Max: 98.3 (29 Aug 2024 14:28)  HR: 111 (29 Aug 2024 14:28) (111 - 111)  BP: 152/90 (29 Aug 2024 14:28) (152/90 - 152/90)  BP(mean): --  ABP: --  ABP(mean): --  RR: 20 (29 Aug 2024 14:28) (20 - 20)  SpO2: 98% (29 Aug 2024 14:28) (98% - 98%)    O2 Parameters below as of 29 Aug 2024 14:28  Patient On (Oxygen Delivery Method): room air    #Acute Hypercapnic Respiratory Failure 2/2 COPD Exacerbation  - VBG: PCO2 88 --> 81 (08/29)  - CXR: hyperinflation without consolidation; repeat CXR 9/3 no gross changes  - Procal normal 0.10 8/29  - BiPAP qhs; on NC keep spO2 >88; on home O2 PRN  - treated with solumedrol and azithromycin with significant improvement and resolution of COPD, completed course while inpatient    - PT, able to ambulate with walker       #HTN  #HLD  #CAD s/p stents  - c/w Norvasc 5  - c/w Lipitor 10    #H/o Dysphagia & Zenker  - SLP consulted- cleared for soft bite sized diet  - F/u OP    #DM  - A1c 6.7  - monitor FS  - c/w ISS       Patient has been able to breathe without distress, O2 sat 95-97% on 2L O2 NC. Patient is able to ambulate with walker during  PT assessment and has been hemodynamically stable. She has some cough, which is her baseline given her history of   COPD and smoking.    Discussion of discharge plan of care, including discharge diagnoses, medication reconciliation, and follow-ups, was   conducted with Dr. Burgos on 9/6/24, and discharge was approved.

## 2024-09-06 NOTE — DISCHARGE NOTE PROVIDER - CARE PROVIDER_API CALL
Daphne Mckay  Internal Medicine  [address] [place] [id number]  Phone: [phone]  Fax: [phone]  Established Patient  Follow Up Time: 1 week

## 2024-09-06 NOTE — CHART NOTE - NSCHARTNOTEFT_GEN_A_CORE
Patient has medical necessity requiring rolling walker due to history of COPD. Patient has dyspnea on exertion which would be better controlled with walking assistance.

## 2024-09-06 NOTE — DISCHARGE NOTE PROVIDER - NSDCCPCAREPLAN_GEN_ALL_CORE_FT
PRINCIPAL DISCHARGE DIAGNOSIS  Diagnosis: COPD exacerbation  Assessment and Plan of Treatment: You came into the hospital with shortness of breath. Imaging and blood work showed no signs of pneumonia or infection. There were no signs of blood clots. We treated you with inhalers, IV steroids, and a BiPAP machine. After a week, you are now comfortable breathing on 2L O2 nasal cannula and are able to walk with assistance. Please continue to use oxygen at home as needed.  -Take all the medications you were discharged with, unless otherwise instructed by your healthcare provider(s).  - It is recommended you follow up with your PCP. Please call to make an appointment. Bring your paperwork from this hospital stay to that visit. You can access your visit information by signing up for an account for the patient portal at [email or web address]  -Seek immediate medical attention if you develop fevers, chills, chest pain, shortness of breath, dizziness, nausea and vomiting, abdominal pain, passing out, weakness or numbness or tingling, or any other concerning signs or symptoms.

## 2024-09-06 NOTE — DISCHARGE NOTE PROVIDER - NSDCMRMEDTOKEN_GEN_ALL_CORE_FT
ALPRAZolam 0.5 mg oral tablet: 1 tab(s) orally once a day (at bedtime) as needed for  anxiety  amLODIPine 5 mg oral tablet: 1 tab(s) orally once a day  Anoro Ellipta 62.5 mcg-25 mcg/inh inhalation powder: 1 puff(s) inhaled once a day  atorvastatin 10 mg oral tablet: 1 tab(s) orally once a day  metFORMIN 500 mg oral tablet: 1 tab(s) orally 2 times a day  pantoprazole 40 mg oral delayed release tablet: 1 tab(s) orally once a day before breakfast  Trelegy Ellipta 100 mcg-62.5 mcg-25 mcg/inh inhalation powder: 1 puff(s) inhaled 2 times a day

## 2024-09-07 ENCOUNTER — INPATIENT (INPATIENT)
Facility: HOSPITAL | Age: 79
LOS: 3 days | Discharge: HOME | DRG: 605 | End: 2024-09-11
Attending: STUDENT IN AN ORGANIZED HEALTH CARE EDUCATION/TRAINING PROGRAM | Admitting: STUDENT IN AN ORGANIZED HEALTH CARE EDUCATION/TRAINING PROGRAM
Payer: MEDICARE

## 2024-09-07 VITALS
HEART RATE: 85 BPM | TEMPERATURE: 98 F | HEIGHT: 65 IN | SYSTOLIC BLOOD PRESSURE: 98 MMHG | RESPIRATION RATE: 18 BRPM | OXYGEN SATURATION: 85 % | DIASTOLIC BLOOD PRESSURE: 62 MMHG

## 2024-09-07 DIAGNOSIS — Z90.710 ACQUIRED ABSENCE OF BOTH CERVIX AND UTERUS: Chronic | ICD-10-CM

## 2024-09-07 DIAGNOSIS — R29.6 REPEATED FALLS: ICD-10-CM

## 2024-09-07 LAB
ALBUMIN SERPL ELPH-MCNC: 4.1 G/DL — SIGNIFICANT CHANGE UP (ref 3.5–5.2)
ALP SERPL-CCNC: 103 U/L — SIGNIFICANT CHANGE UP (ref 30–115)
ALT FLD-CCNC: 70 U/L — HIGH (ref 0–41)
ANION GAP SERPL CALC-SCNC: 8 MMOL/L — SIGNIFICANT CHANGE UP (ref 7–14)
ANION GAP SERPL CALC-SCNC: 8 MMOL/L — SIGNIFICANT CHANGE UP (ref 7–14)
APPEARANCE UR: CLEAR — SIGNIFICANT CHANGE UP
APTT BLD: 29.3 SEC — SIGNIFICANT CHANGE UP (ref 27–39.2)
AST SERPL-CCNC: 41 U/L — SIGNIFICANT CHANGE UP (ref 0–41)
BACTERIA # UR AUTO: NEGATIVE /HPF — SIGNIFICANT CHANGE UP
BASE EXCESS BLDV CALC-SCNC: 15.1 MMOL/L — HIGH (ref -2–3)
BASOPHILS # BLD AUTO: 0.03 K/UL — SIGNIFICANT CHANGE UP (ref 0–0.2)
BASOPHILS NFR BLD AUTO: 0.2 % — SIGNIFICANT CHANGE UP (ref 0–1)
BILIRUB SERPL-MCNC: 0.8 MG/DL — SIGNIFICANT CHANGE UP (ref 0.2–1.2)
BILIRUB UR-MCNC: ABNORMAL
BUN SERPL-MCNC: 20 MG/DL — SIGNIFICANT CHANGE UP (ref 10–20)
BUN SERPL-MCNC: 21 MG/DL — HIGH (ref 10–20)
CA-I SERPL-SCNC: 1.16 MMOL/L — SIGNIFICANT CHANGE UP (ref 1.15–1.33)
CALCIUM SERPL-MCNC: 8.6 MG/DL — SIGNIFICANT CHANGE UP (ref 8.4–10.5)
CALCIUM SERPL-MCNC: 9.6 MG/DL — SIGNIFICANT CHANGE UP (ref 8.4–10.5)
CAST: 38 /LPF — HIGH (ref 0–4)
CHLORIDE SERPL-SCNC: 93 MMOL/L — LOW (ref 98–110)
CHLORIDE SERPL-SCNC: 96 MMOL/L — LOW (ref 98–110)
CO2 SERPL-SCNC: 38 MMOL/L — HIGH (ref 17–32)
CO2 SERPL-SCNC: 42 MMOL/L — CRITICAL HIGH (ref 17–32)
COLOR SPEC: ABNORMAL
CREAT SERPL-MCNC: 0.5 MG/DL — LOW (ref 0.7–1.5)
CREAT SERPL-MCNC: 0.7 MG/DL — SIGNIFICANT CHANGE UP (ref 0.7–1.5)
DIFF PNL FLD: NEGATIVE — SIGNIFICANT CHANGE UP
EGFR: 87 ML/MIN/1.73M2 — SIGNIFICANT CHANGE UP
EGFR: 95 ML/MIN/1.73M2 — SIGNIFICANT CHANGE UP
EOSINOPHIL # BLD AUTO: 0.15 K/UL — SIGNIFICANT CHANGE UP (ref 0–0.7)
EOSINOPHIL NFR BLD AUTO: 0.9 % — SIGNIFICANT CHANGE UP (ref 0–8)
GAS PNL BLDV: 136 MMOL/L — SIGNIFICANT CHANGE UP (ref 136–145)
GAS PNL BLDV: SIGNIFICANT CHANGE UP
GAS PNL BLDV: SIGNIFICANT CHANGE UP
GLUCOSE BLDC GLUCOMTR-MCNC: 132 MG/DL — HIGH (ref 70–99)
GLUCOSE SERPL-MCNC: 73 MG/DL — SIGNIFICANT CHANGE UP (ref 70–99)
GLUCOSE SERPL-MCNC: 86 MG/DL — SIGNIFICANT CHANGE UP (ref 70–99)
GLUCOSE UR QL: NEGATIVE MG/DL — SIGNIFICANT CHANGE UP
HCO3 BLDV-SCNC: 46 MMOL/L — CRITICAL HIGH (ref 22–29)
HCT VFR BLD CALC: 48.6 % — HIGH (ref 37–47)
HCT VFR BLDA CALC: 48 % — HIGH (ref 34.5–46.5)
HGB BLD CALC-MCNC: 16.1 G/DL — SIGNIFICANT CHANGE UP (ref 11.7–16.1)
HGB BLD-MCNC: 15.3 G/DL — SIGNIFICANT CHANGE UP (ref 12–16)
IMM GRANULOCYTES NFR BLD AUTO: 0.8 % — HIGH (ref 0.1–0.3)
INR BLD: 1.12 RATIO — SIGNIFICANT CHANGE UP (ref 0.65–1.3)
KETONES UR-MCNC: 40 MG/DL
LACTATE BLDV-MCNC: 1.3 MMOL/L — SIGNIFICANT CHANGE UP (ref 0.5–2)
LEUKOCYTE ESTERASE UR-ACNC: ABNORMAL
LYMPHOCYTES # BLD AUTO: 14.4 % — LOW (ref 20.5–51.1)
LYMPHOCYTES # BLD AUTO: 2.33 K/UL — SIGNIFICANT CHANGE UP (ref 1.2–3.4)
MCHC RBC-ENTMCNC: 30.7 PG — SIGNIFICANT CHANGE UP (ref 27–31)
MCHC RBC-ENTMCNC: 31.5 G/DL — LOW (ref 32–37)
MCV RBC AUTO: 97.6 FL — SIGNIFICANT CHANGE UP (ref 81–99)
MONOCYTES # BLD AUTO: 1.06 K/UL — HIGH (ref 0.1–0.6)
MONOCYTES NFR BLD AUTO: 6.6 % — SIGNIFICANT CHANGE UP (ref 1.7–9.3)
NEUTROPHILS # BLD AUTO: 12.48 K/UL — HIGH (ref 1.4–6.5)
NEUTROPHILS NFR BLD AUTO: 77.1 % — HIGH (ref 42.2–75.2)
NITRITE UR-MCNC: NEGATIVE — SIGNIFICANT CHANGE UP
NRBC # BLD: 0 /100 WBCS — SIGNIFICANT CHANGE UP (ref 0–0)
PCO2 BLDV: 88 MMHG — CRITICAL HIGH (ref 39–42)
PH BLDV: 7.33 — SIGNIFICANT CHANGE UP (ref 7.32–7.43)
PH UR: 5.5 — SIGNIFICANT CHANGE UP (ref 5–8)
PLATELET # BLD AUTO: 245 K/UL — SIGNIFICANT CHANGE UP (ref 130–400)
PMV BLD: 11.2 FL — HIGH (ref 7.4–10.4)
PO2 BLDV: 30 MMHG — SIGNIFICANT CHANGE UP (ref 25–45)
POTASSIUM BLDV-SCNC: 3.7 MMOL/L — SIGNIFICANT CHANGE UP (ref 3.5–5.1)
POTASSIUM SERPL-MCNC: 3.7 MMOL/L — SIGNIFICANT CHANGE UP (ref 3.5–5)
POTASSIUM SERPL-MCNC: 3.9 MMOL/L — SIGNIFICANT CHANGE UP (ref 3.5–5)
POTASSIUM SERPL-SCNC: 3.7 MMOL/L — SIGNIFICANT CHANGE UP (ref 3.5–5)
POTASSIUM SERPL-SCNC: 3.9 MMOL/L — SIGNIFICANT CHANGE UP (ref 3.5–5)
PROT SERPL-MCNC: 6.2 G/DL — SIGNIFICANT CHANGE UP (ref 6–8)
PROT UR-MCNC: SIGNIFICANT CHANGE UP MG/DL
PROTHROM AB SERPL-ACNC: 12.8 SEC — SIGNIFICANT CHANGE UP (ref 9.95–12.87)
RBC # BLD: 4.98 M/UL — SIGNIFICANT CHANGE UP (ref 4.2–5.4)
RBC # FLD: 12.4 % — SIGNIFICANT CHANGE UP (ref 11.5–14.5)
RBC CASTS # UR COMP ASSIST: 4 /HPF — SIGNIFICANT CHANGE UP (ref 0–4)
SAO2 % BLDV: 43.5 % — LOW (ref 67–88)
SODIUM SERPL-SCNC: 142 MMOL/L — SIGNIFICANT CHANGE UP (ref 135–146)
SODIUM SERPL-SCNC: 143 MMOL/L — SIGNIFICANT CHANGE UP (ref 135–146)
SP GR SPEC: 1.03 — SIGNIFICANT CHANGE UP (ref 1–1.03)
SQUAMOUS # UR AUTO: 28 /HPF — HIGH (ref 0–5)
UROBILINOGEN FLD QL: 1 MG/DL — SIGNIFICANT CHANGE UP (ref 0.2–1)
WBC # BLD: 16.18 K/UL — HIGH (ref 4.8–10.8)
WBC # FLD AUTO: 16.18 K/UL — HIGH (ref 4.8–10.8)
WBC UR QL: 15 /HPF — HIGH (ref 0–5)

## 2024-09-07 PROCEDURE — 97166 OT EVAL MOD COMPLEX 45 MIN: CPT | Mod: GO

## 2024-09-07 PROCEDURE — 97162 PT EVAL MOD COMPLEX 30 MIN: CPT | Mod: GP

## 2024-09-07 PROCEDURE — 99285 EMERGENCY DEPT VISIT HI MDM: CPT | Mod: 25

## 2024-09-07 PROCEDURE — 70450 CT HEAD/BRAIN W/O DYE: CPT | Mod: 26,MC

## 2024-09-07 PROCEDURE — 85027 COMPLETE CBC AUTOMATED: CPT

## 2024-09-07 PROCEDURE — 99223 1ST HOSP IP/OBS HIGH 75: CPT

## 2024-09-07 PROCEDURE — 72170 X-RAY EXAM OF PELVIS: CPT | Mod: 26

## 2024-09-07 PROCEDURE — 85025 COMPLETE CBC W/AUTO DIFF WBC: CPT

## 2024-09-07 PROCEDURE — 94640 AIRWAY INHALATION TREATMENT: CPT

## 2024-09-07 PROCEDURE — 12001 RPR S/N/AX/GEN/TRNK 2.5CM/<: CPT

## 2024-09-07 PROCEDURE — 93010 ELECTROCARDIOGRAM REPORT: CPT

## 2024-09-07 PROCEDURE — 82962 GLUCOSE BLOOD TEST: CPT

## 2024-09-07 PROCEDURE — 36415 COLL VENOUS BLD VENIPUNCTURE: CPT

## 2024-09-07 PROCEDURE — 97116 GAIT TRAINING THERAPY: CPT | Mod: GP

## 2024-09-07 PROCEDURE — 72125 CT NECK SPINE W/O DYE: CPT | Mod: 26,MC

## 2024-09-07 PROCEDURE — 97110 THERAPEUTIC EXERCISES: CPT | Mod: GP

## 2024-09-07 PROCEDURE — 71045 X-RAY EXAM CHEST 1 VIEW: CPT | Mod: 26

## 2024-09-07 PROCEDURE — 80048 BASIC METABOLIC PNL TOTAL CA: CPT

## 2024-09-07 PROCEDURE — 73562 X-RAY EXAM OF KNEE 3: CPT | Mod: 50

## 2024-09-07 RX ORDER — METFORMIN HYDROCHLORIDE 850 MG/1
1 TABLET, FILM COATED ORAL
Refills: 0 | DISCHARGE

## 2024-09-07 RX ORDER — FLUTICASONE FUROATE, UMECLIDINIUM BROMIDE AND VILANTEROL TRIFENATATE 200; 62.5; 25 UG/1; UG/1; UG/1
1 POWDER RESPIRATORY (INHALATION)
Refills: 0 | DISCHARGE

## 2024-09-07 RX ORDER — TETANUS TOXOID, REDUCED DIPHTHERIA TOXOID AND ACELLULAR PERTUSSIS VACCINE, ADSORBED 5; 2.5; 8; 8; 2.5 [IU]/.5ML; [IU]/.5ML; UG/.5ML; UG/.5ML; UG/.5ML
0.5 SUSPENSION INTRAMUSCULAR ONCE
Refills: 0 | Status: COMPLETED | OUTPATIENT
Start: 2024-09-07 | End: 2024-09-07

## 2024-09-07 RX ORDER — DEXTROSE 15 G/33 G
12.5 GEL IN PACKET (GRAM) ORAL ONCE
Refills: 0 | Status: DISCONTINUED | OUTPATIENT
Start: 2024-09-07 | End: 2024-09-11

## 2024-09-07 RX ORDER — FLUTICASONE PROPIONATE AND SALMETEROL 250; 50 UG/1; UG/1
1 POWDER RESPIRATORY (INHALATION)
Refills: 0 | Status: DISCONTINUED | OUTPATIENT
Start: 2024-09-07 | End: 2024-09-11

## 2024-09-07 RX ORDER — DEXTROSE 15 G/33 G
25 GEL IN PACKET (GRAM) ORAL ONCE
Refills: 0 | Status: DISCONTINUED | OUTPATIENT
Start: 2024-09-07 | End: 2024-09-11

## 2024-09-07 RX ORDER — ALPRAZOLAM 0.25 MG
0.5 TABLET ORAL AT BEDTIME
Refills: 0 | Status: DISCONTINUED | OUTPATIENT
Start: 2024-09-07 | End: 2024-09-11

## 2024-09-07 RX ORDER — DEXTROSE 15 G/33 G
15 GEL IN PACKET (GRAM) ORAL ONCE
Refills: 0 | Status: DISCONTINUED | OUTPATIENT
Start: 2024-09-07 | End: 2024-09-11

## 2024-09-07 RX ORDER — TIOTROPIUM BROMIDE INHALATION SPRAY 3.12 UG/1
2 SPRAY, METERED RESPIRATORY (INHALATION) DAILY
Refills: 0 | Status: DISCONTINUED | OUTPATIENT
Start: 2024-09-07 | End: 2024-09-11

## 2024-09-07 RX ORDER — ENOXAPARIN SODIUM 100 MG/ML
40 INJECTION SUBCUTANEOUS EVERY 24 HOURS
Refills: 0 | Status: DISCONTINUED | OUTPATIENT
Start: 2024-09-07 | End: 2024-09-08

## 2024-09-07 RX ORDER — PANTOPRAZOLE SODIUM 40 MG
40 TABLET, DELAYED RELEASE (ENTERIC COATED) ORAL
Refills: 0 | Status: DISCONTINUED | OUTPATIENT
Start: 2024-09-07 | End: 2024-09-11

## 2024-09-07 RX ORDER — GLUCAGON INJECTION, SOLUTION 1 MG/.2ML
1 INJECTION, SOLUTION SUBCUTANEOUS ONCE
Refills: 0 | Status: DISCONTINUED | OUTPATIENT
Start: 2024-09-07 | End: 2024-09-11

## 2024-09-07 RX ADMIN — Medication 10 MILLIGRAM(S): at 23:00

## 2024-09-07 RX ADMIN — Medication 1000 MILLILITER(S): at 11:22

## 2024-09-07 RX ADMIN — TETANUS TOXOID, REDUCED DIPHTHERIA TOXOID AND ACELLULAR PERTUSSIS VACCINE, ADSORBED 0.5 MILLILITER(S): 5; 2.5; 8; 8; 2.5 SUSPENSION INTRAMUSCULAR at 11:17

## 2024-09-07 NOTE — ED ADULT NURSE NOTE - NSFALLRISKINTERV_ED_ALL_ED

## 2024-09-07 NOTE — H&P ADULT - ASSESSMENT
Assessment  78-year-old female past medical history of COPD on home O2 PRN, current smoker, hypertension, hyperlipidemia, CAD status post stent 2019, hx of dysphagia and zenker diverticulum presents to the ED s/p mechanical fall with HT this morning. The pt was admitted to medicine for Placement as the family cannot care for the pt.       #mechanical fall  #head trauma  - S/p suturing of laceration in the ED, 3, 2-0 sutures  - C/w local wound care  - No LOC or abnormal movements before or after fall  - Mental status was at baseline before and after fall  - No chest pain or palpitations   - Pt for discharg plan and rehab    #COPD  #baseline O2 requirement at home   - On 2l at home PRN  - VBG shows increased CO2 but normal pH vs previous admission with increased CO2 and Ph of 7.18  - Pt is compensated resp acidosis  - CXR does not show effusions or consolidations  - Previous exacerbation was due to parainfluenza  - nc to keep spo2 >88; on home o2 prn  - C/w home inhalers while in hospital    #HTN  -C/w home regimen     #Zenker diverticulum.   - outpt f/u.    DVT ppx- lovenox  GI ppx- not needed  diet- Dash   Assessment  78-year-old female past medical history of COPD on home O2 PRN, current smoker, hypertension, hyperlipidemia, CAD status post stent 2019, hx of dysphagia and zenker diverticulum presents to the ED s/p mechanical fall with HT this morning. The pt was admitted to medicine for Placement as the family cannot care for the pt.       #mechanical fall  #head trauma  - As per pt and daughter the pt had a mechanical fall and mistepped in her restroom. The pt was able to get up herself and noticed profuse bleeding which brought her to the hospital.  - As per the daughter the pt should never have been discharged home and should have been discharged to rehab.  - S/p suturing of laceration in the ED, 3, 2-0 sutures  - C/w local wound care  - No LOC or abnormal movements before or after fall  - Mental status was at baseline before and after fall  - No chest pain or palpitations   - Pt for discharg plan and rehab    #COPD  #baseline O2 requirement at home   - On 2l at home PRN  - VBG shows increased CO2 but normal pH vs previous admission with increased CO2 and Ph of 7.18  - Pt is compensated resp acidosis  - CXR does not show effusions or consolidations  - Previous exacerbation was due to parainfluenza  - nc to keep spo2 >88; on home o2 prn  - C/w home inhalers while in hospital    #HTN  -C/w home regimen     #Zenker diverticulum.   - outpt f/u.    DVT ppx- lovenox  GI ppx- not needed  diet- Dash   Assessment  78-year-old female past medical history of COPD on home O2 PRN, current smoker, hypertension, hyperlipidemia, CAD status post stent 2019, hx of dysphagia and zenker diverticulum presents to the ED s/p mechanical fall with HT this morning. The pt was admitted to medicine for Placement as the family cannot care for the pt.       #mechanical fall  #head trauma  - As per pt and daughter the pt had a mechanical fall and mistepped in her restroom. The pt was able to get up herself and noticed profuse bleeding which brought her to the hospital.  - As per the daughter the pt should never have been discharged home and should have been discharged to rehab.  - S/p suturing of laceration in the ED, 3, 2-0 sutures  - C/w local wound care  - No LOC or abnormal movements before or after fall  - Mental status was at baseline before and after fall  - No chest pain or palpitations   - Pt for discharg plan and rehab    #COPD  #baseline O2 requirement at home   - On 2l at home PRN  - VBG shows increased CO2 but normal pH vs previous admission with increased CO2 and Ph of 7.18  - Pt is compensated resp acidosis  - CXR does not show effusions or consolidations  - Previous exacerbation was due to parainfluenza  - nc to keep spo2 >88; on home o2 prn  - C/w home inhalers while in hospital  - WBC likely reactive. Pt did receive prednisone while admitted and received last dose yesterday morning No fevers and signs of infection.   - Monitor for now    #HTN  -Hold    #Zenker diverticulum.   - outpt f/u.    DVT ppx- lovenox  GI ppx- not needed  diet- Dash   Assessment  78-year-old female past medical history of COPD on home O2 PRN, current smoker, hypertension, hyperlipidemia, CAD status post stent 2019, hx of dysphagia and zenker diverticulum presents to the ED s/p mechanical fall with HT this morning. The pt was admitted to medicine for Placement as the family cannot care for the pt.       #mechanical fall  #head trauma  - As per pt and daughter the pt had a mechanical fall and mistepped in her restroom. The pt was able to get up herself and noticed profuse bleeding which brought her to the hospital.  - As per the daughter the pt should never have been discharged home and should have been discharged to rehab.  - S/p suturing of laceration in the ED, 3, 2-0 sutures  - C/w local wound care  - No LOC or abnormal movements before or after fall  - Mental status was at baseline before and after fall  - No chest pain or palpitations   - Pt for discharg plan and rehab    #COPD  #baseline O2 requirement at home   - On 2l at home PRN  - VBG shows increased CO2 but normal pH vs previous admission with increased CO2 and Ph of 7.18  - Pt is compensated resp acidosis  - CXR does not show effusions or consolidations  - Previous exacerbation was due to parainfluenza  - nc to keep spo2 >88; on home o2 prn  - C/w home inhalers while in hospital  - WBC likely reactive. Pt did receive prednisolone while admitted and received last dose yesterday morning No fevers and signs of infection.   - Monitor for now    #HTN  -Hold for now as BP is soft  - Can resume if hypertensive    #Zenker diverticulum.   - outpt f/u.    DVT ppx- lovenox  GI ppx- not needed  diet- Dash   Assessment  78-year-old female past medical history of COPD on home O2 PRN, current smoker, hypertension, hyperlipidemia, CAD status post stent 2019, hx of dysphagia and zenker diverticulum presents to the ED s/p mechanical fall with HT this morning. The pt was admitted to medicine for Placement as the family cannot care for the pt.       #mechanical fall  #head trauma  - As per pt and daughter the pt had a mechanical fall and mistepped in her restroom. The pt was able to get up herself and noticed profuse bleeding which brought her to the hospital.  - As per the daughter the pt should never have been discharged home and should have been discharged to rehab.  - S/p suturing of laceration in the ED, 3, 2-0 sutures  - C/w local wound care  - No LOC or abnormal movements before or after fall  - Mental status was at baseline before and after fall  - No chest pain or palpitations   - Pt for discharg plan and rehab    #COPD  #baseline O2 requirement at home   - On 2l at home PRN  - VBG shows increased CO2 but normal pH vs previous admission with increased CO2 and Ph of 7.18  - Pt is compensated resp acidosis  - CXR does not show effusions or consolidations  - Previous exacerbation was due to parainfluenza  - nc to keep spo2 88-92; on home o2 prn  - C/w home inhalers while in hospital  - WBC likely reactive. Pt did receive prednisolone while admitted and received last dose yesterday morning No fevers and signs of infection.   - Monitor for now    #HLD  #CAD  #HTN  -Hold for now as BP is soft  - Can resume if hypertensive  - C/w atorvas    #Zenker diverticulum.   - outpt f/u.    #DM  - on metformin at home  - C/w ss for now    DVT ppx- lovenox  GI ppx- not needed  diet- Dash/ CC

## 2024-09-07 NOTE — ED ADULT NURSE NOTE - OBJECTIVE STATEMENT
Pt reports to ed for unwitnessed fall @ approx 1am. Pt reportas +HT, lac noted to back of head. Bleeding continuous upon arrival. Pt states she tripped over her walker. Pt states she went back to sleep & showered after hitting her head, prior to calling EMS. -LOC, -AC use.

## 2024-09-07 NOTE — H&P ADULT - ATTENDING COMMENTS
Patient seen and examined at bedside independently of the residents. I read the resident's note and agree with the plan with the additions and corrections as noted below. My note supersedes the resident's note.     REVIEW OF SYSTEMS:  Negative except in HPI.     PMH: HTN, HLD, CAD s/p stent (2019), Dysphagia/Zenker diverticulum, COPD (on home O2 prn), Current smoker     FHx: Reviewed. No fhx of asthma/copd, No fhx of liver and pulmonary disease. No fhx of hematological disorder.     Physical Exam:  GEN: No acute distress. Awake, Alert and oriented x 3.   Head: Normocephalic. + left parietal laceration s/p sutured.   Eye: PEERLA. No sclera icterus. EOMI.   ENT: Normal oropharynx, no thyromegaly, no mass, no lymphadenopathy.   LUNGS: Clear to auscultation bilaterally. No wheeze/rales/crackles.   HEART: Normal. S1/S2 present. RRR. No murmur/gallops.   ABD: Soft, non-tender, non-distended. Bowel sounds present.   EXT: No pitting edema. No erythema. No tenderness.  Integumentary: No rash, No sore, No petechia.   NEURO: CN III-XII intact. Strength: 5/5 b/l ULE. Sensory intact b/l ULE.     Vital Signs Last 24 Hrs  T(C): 36.6 (07 Sep 2024 15:39), Max: 36.8 (07 Sep 2024 09:36)  T(F): 97.9 (07 Sep 2024 15:39), Max: 98.2 (07 Sep 2024 09:36)  HR: 58 (07 Sep 2024 15:39) (58 - 85)  BP: 117/70 (07 Sep 2024 15:39) (98/62 - 146/63)  BP(mean): --  RR: 18 (07 Sep 2024 15:39) (18 - 18)  SpO2: 97% (07 Sep 2024 15:39) (85% - 97%)    Parameters below as of 07 Sep 2024 09:36  Patient On (Oxygen Delivery Method): room air      Please see the above notes for Labs and radiology.     Assessment and Plan:     77 yo F with hx of HTN, HLD, CAD s/p stent (2019), Dysphagia/Zenker diverticulum, COPD (on home O2 prn), Current smoker presents to ED for mechanical fall with head trauma this morning.     Scalp laceration 2/2 mechanical fall   - CTH: No evidence of acute intracranial pathology. Left parietal scalp soft tissue swelling with subcutaneous hematoma.  - CT c-spine: No evidence of acute cervical spine fracture or subluxation.  - s/p laceration repaired by ED.   - local wound care.   - fall precaution.   - PT/Rehab.   -  consult for STR placement.     COPD   - recently treated with steroid and abx.   - c/w home inhalers for now.   - supplemental O2 prn    Leukocytosis likely 2/2 recent steroid use  - No fever.   - UA unremarkable. CXR neg.   - monitor off abx for now.   - monitor for fever. If febrile --> sepsis w/u and start on broad spectrum abx.     HTN  - Hold anti-HTN medication for now as BP borderline currently. May resume on amlodipine if BP persistently elevated.     HLD - c/w home med.   DM II - monitor FS AC HS. Insulin regimen.   CAD s/p stent - c/w home med.     DVT ppx: Lovenox SC  GI ppx: PPI  Diet: DASH/CC diet  Activity: as tolerated.     Date seen by the attendin2024  Total time spent: 75 minutes. Patient seen and examined at bedside independently of the residents. I read the resident's note and agree with the plan with the additions and corrections as noted below. My note supersedes the resident's note.     REVIEW OF SYSTEMS:  Negative except in HPI.     PMH: HTN, HLD, CAD s/p stent (2019), Dysphagia/Zenker diverticulum, COPD (on home O2 prn), Current smoker     FHx: Reviewed. No fhx of asthma/copd, No fhx of liver and pulmonary disease. No fhx of hematological disorder.     Physical Exam:  GEN: No acute distress. Awake, Alert and oriented x 3.   Head: Normocephalic. + left posterior scalp laceration s/p sutured with dried blood.  Eye: PEERLA. No sclera icterus. EOMI.   ENT: Normal oropharynx, no thyromegaly, no mass, no lymphadenopathy.   LUNGS: Clear to auscultation bilaterally. No wheeze/rales/crackles.   HEART: Normal. S1/S2 present. RRR. No murmur/gallops.   ABD: Soft, non-tender, non-distended. Bowel sounds present.   EXT: No pitting edema. No erythema. No tenderness.  Integumentary: No rash, No sore, No petechia.   NEURO: CN III-XII intact. Strength: 5/5 b/l ULE. Sensory intact b/l ULE.     Vital Signs Last 24 Hrs  T(C): 36.6 (07 Sep 2024 15:39), Max: 36.8 (07 Sep 2024 09:36)  T(F): 97.9 (07 Sep 2024 15:39), Max: 98.2 (07 Sep 2024 09:36)  HR: 58 (07 Sep 2024 15:39) (58 - 85)  BP: 117/70 (07 Sep 2024 15:39) (98/62 - 146/63)  BP(mean): --  RR: 18 (07 Sep 2024 15:39) (18 - 18)  SpO2: 97% (07 Sep 2024 15:39) (85% - 97%)    Parameters below as of 07 Sep 2024 09:36  Patient On (Oxygen Delivery Method): room air      Please see the above notes for Labs and radiology.     Assessment and Plan:     77 yo F with hx of HTN, HLD, CAD s/p stent (2019), Dysphagia/Zenker diverticulum, COPD (on home O2 prn), Current smoker presents to ED for mechanical fall with head trauma this morning.     Scalp laceration 2/2 mechanical fall   - CTH: No evidence of acute intracranial pathology. Left parietal scalp soft tissue swelling with subcutaneous hematoma.  - CT c-spine: No evidence of acute cervical spine fracture or subluxation.  - s/p laceration repaired by ED.   - local wound care.   - fall precaution.   - PT/Rehab.   -  consult for STR placement.     COPD   - recently treated with steroid and abx.   - c/w home inhalers for now.   - supplemental O2 prn    Leukocytosis likely 2/2 recent steroid use  - No fever.   - UA unremarkable. CXR neg.   - monitor off abx for now.   - monitor for fever. If febrile --> sepsis w/u and start on broad spectrum abx.     HTN  - Hold anti-HTN medication for now as BP borderline currently. May resume on amlodipine if BP persistently elevated.     HLD - c/w home med.   DM II - monitor FS AC HS. Insulin regimen.   CAD s/p stent - c/w home med.     DVT ppx: Lovenox SC  GI ppx: PPI  Diet: DASH/CC diet  Activity: as tolerated.     Date seen by the attendin2024  Total time spent: 75 minutes.

## 2024-09-07 NOTE — H&P ADULT - NSHPPHYSICALEXAM_GEN_ALL_CORE
CONSTITUTIONAL: well-appearing, in NAD  SKIN: Warm dry, normal skin turgor  HEAD: NCAT, 2 cm laceration to occipital scalp  EYES: EOMI, PERRLA, no scleral icterus, conjunctiva pink  ENT: normal pharynx with no erythema or exudates  NECK: Supple; non tender. Full ROM.  CARD: RRR, no murmurs.  RESP: clear to ausculation b/l. No crackles or wheezing.  ABD: soft, non-tender, non-distended, no rebound or guarding.  EXT: Full ROM, no bony tenderness, no pedal edema, no calf tenderness  NEURO: normal motor. normal sensory. CN II-XII intact. Cerebellar testing normal. Normal gait.  PSYCH: Cooperative, appropriate

## 2024-09-07 NOTE — ED PROVIDER NOTE - CLINICAL SUMMARY MEDICAL DECISION MAKING FREE TEXT BOX
.    78-year-old female, PMH HTN DM, HLD, COPD on home O2 as needed, CAD, s/p admission for COPD, with discharge yesterday COPD, p/w posterior scalp laceration status post mechanical fall this morning hitting head on floor or wall.  No LOC, neck pain, chest pain, shortness of breath or focal neurologic deficit.    Exam as noted above.    Additional information taken from chart review, including formal labs.    Differential diagnosis includes but not limited to intracranial trauma, fracture, laceration.    All available lab tests, imaging tests, and EKGs independently reviewed and interpreted by David pardo.  CT imaging shows no acute intracranial injury, no acute displaced fracture or dislocation.  Chest x-ray shows no focal consolidation or other traumatic injury.  Pelvic x-ray shows no acute displaced fracture or dislocation.  Laboratory work shows prerenal azotemia, leukocytosis, and chronic respiratory acidosis with metabolic compensation.  EKG NL sinus, rate 68, no STEMI    Extensive conversation had with patient-patient feels that after hospitalization she is weak and is a fall risk    Impression fall, scalp laceration, decreased ambulation status.  Will admit patient to medicine for further workup and management.    .

## 2024-09-07 NOTE — ED ADULT TRIAGE NOTE - CHIEF COMPLAINT QUOTE
Pt BIBEMS for unwitnessed fall last night. + HT with continuous bleeding in triage from back of head. Per pt she tripped over her walker and unsure if she hit her head on the wall or bedside table. Per ems, the hallway and her bed were saturated in blood and bleeding has not stopped since. - LOC - AC

## 2024-09-07 NOTE — ED ADULT NURSE NOTE - PRIMARY CARE PROVIDER
"[facility]  [address] [place] [address]  Phone: [phone]  Fax: [phone]    History & Physical         Provider: Dr. Pete Joaquin    Patient Name: Pascual DOUGHERTY (age):1965  56 y.o.           Gender: male   Phone: [phone]     Referring Physician: Primary Doctor No     Vital Signs:   Height - 6' 1"  Weight - 180 lbs  BMI - 23.75     Plan: Colonoscopy    Encounter Diagnoses   Name Primary?    Personal history of colon cancer Yes    History of colon polyps     Screening for colon cancer            History:      Past Medical History:   Diagnosis Date    CIDP (chronic inflammatory demyelinating polyneuropathy)     History of colon cancer     s/p resection and chemo in     History of liver cancer 2010    s/p right heaptic lobectomy and chemo    PTSD (post-traumatic stress disorder)     Type 2 diabetes mellitus without complications       Past Surgical History:   Procedure Laterality Date    APPENDECTOMY      CHOLECYSTECTOMY      COLECTOMY      anastomosis at 20 cm    HERNIA REPAIR      ventral    LIVER LOBECTOMY Right     NECK SURGERY        Medication List with Changes/Refills   New Medications    SOD SULF-POT CHLORIDE-MAG SULF (SUTAB) 1.479-0.188- 0.225 GRAM TABLET    Take 12 tablets by mouth once daily. Take according to package instructions with indicated amount of water. No breakfast day before test. May substitute with Suprep, Clenpiq, Plenvu, Moviprep or GoLytely based on Rx plan and patient preference.   Current Medications    GABAPENTIN (NEURONTIN) 300 MG CAPSULE    Take 300 mg by mouth 3 (three) times daily.    JARDIANCE 10 MG TABLET        OZEMPIC 1 MG/DOSE (4 MG/3 ML)    INJECT 1 MG SUBCUTANEOUS EVERY WEEK    OZEMPIC 2 MG/DOSE (8 MG/3 ML) PNIJ        PRAZOSIN (MINIPRESS) 1 MG CAP    Take 1 mg by mouth nightly.    QUETIAPINE (SEROQUEL) 200 " MG TAB    Take 200 mg by mouth every evening.    SERTRALINE (ZOLOFT) 25 MG TABLET    Take 25 mg by mouth once daily.   Discontinued Medications    INSULIN GLARGINE (LANTUS) 100 UNIT/ML INJECTION    Inject into the skin every evening.    METFORMIN (GLUCOPHAGE) 850 MG TABLET    Take 850 mg by mouth 3 (three) times daily.      Review of patient's allergies indicates:   Allergen Reactions    Morphine       Family History   Problem Relation Age of Onset    Colon polyps Brother     Crohn's disease Neg Hx     Liver disease Neg Hx       Social History     Tobacco Use    Smoking status: Never Smoker    Smokeless tobacco: Never Used   Substance Use Topics    Alcohol use: Never    Drug use: Never        Physical Examination:     General Appearance:___________________________  HEENT: _____________________________________  Abdomen:____________________________________  Heart:________________________________________  Lungs:_______________________________________  Extremities:___________________________________  Skin:_________________________________________  Endocrine:____________________________________  Genitourinary:_________________________________  Neurological:__________________________________      Patient has been evaluated immediately prior to sedation and is medically cleared for endoscopy with IVCS as an ASA class: ______      Physician Signature: _________________________       Date: ________  Time: ________               pcp

## 2024-09-07 NOTE — ED PROVIDER NOTE - PHYSICAL EXAMINATION
CONSTITUTIONAL: well-appearing, in NAD  SKIN: Warm dry, normal skin turgor  HEAD: NCAT, 2 cm laceration to occipital scalp  EYES: EOMI, PERRLA, no scleral icterus, conjunctiva pink  ENT: normal pharynx with no erythema or exudates  NECK: Supple; non tender. Full ROM.  CARD: RRR, no murmurs.  RESP: clear to ausculation b/l. No crackles or wheezing.  ABD: soft, non-tender, non-distended, no rebound or guarding.  EXT: Full ROM, no bony tenderness, no pedal edema, no calf tenderness  NEURO: normal motor. normal sensory. CN II-XII intact. Cerebellar testing normal. Normal gait.  PSYCH: Cooperative, appropriate.
finger

## 2024-09-07 NOTE — H&P ADULT - NSHPLABSRESULTS_GEN_ALL_CORE
LABS:                          15.3   16.18 )-----------( 245      ( 07 Sep 2024 10:45 )             48.6     09-07    143  |  93<L>  |  21<H>  ----------------------------<  86  3.7   |  42<HH>  |  0.7    Ca    9.6      07 Sep 2024 10:45  Mg     2.2     09-06    TPro  6.2  /  Alb  4.1  /  TBili  0.8  /  DBili  x   /  AST  41  /  ALT  70<H>  /  AlkPhos  103  09-07    PT/INR - ( 07 Sep 2024 10:45 )   PT: 12.80 sec;   INR: 1.12 ratio         PTT - ( 07 Sep 2024 10:45 )  PTT:29.3 sec      PROCEDURE DATE:  09/07/2024          INTERPRETATION:  Indication: Trauma    Technique: Single view of AP pelvis obtained.    Comparison: None.    Findings/  impression:    Study limited by osteopenia. No definitive evidence of acute displaced   fracture. Degenerative changes.    --- End of Report ---    IMPRESSION:    1.  No evidence of acute cervical spine fracture or subluxation.    2.  Multilevel degenerative changes as described.    IMPRESSION:    1.  No evidence of acute intracranial pathology.    2.  Left parietal scalp soft tissue swelling with subcutaneous hematoma.

## 2024-09-07 NOTE — H&P ADULT - HISTORY OF PRESENT ILLNESS
78-year-old female past medical history of COPD on home O2 PRN, current smoker, hypertension, hyperlipidemia, CAD status post stent 2019, hx of dysphagia and zenker diverticulum presents to the ED s/p mechanical fall with HT this morning. The pt was recently admitted to the hospital for a COPD exacerbation and was treated with antibiotics, steroids and Nebulizers. The pt had a laceration on her head that was repaired by the ED. The pts baseline mental status was unaffected before and after the fall. No LOC, chest pain, shortness of breath or abnormal movements before and after the fall.    In the ED,  Vital Signs Last 24 Hrs  T(C): 36.8 (07 Sep 2024 09:36), Max: 36.8 (07 Sep 2024 09:36)  T(F): 98.2 (07 Sep 2024 09:36), Max: 98.2 (07 Sep 2024 09:36)  HR: 85 (07 Sep 2024 09:36) (70 - 85)  BP: 98/62 (07 Sep 2024 09:36) (98/62 - 146/63)  BP(mean): --  RR: 18 (07 Sep 2024 09:36) (18 - 18)  SpO2: 85% (07 Sep 2024 09:36) (85% - 93%)    Parameters below as of 07 Sep 2024 09:36  Patient On (Oxygen Delivery Method): room air    Labs were significant for WBC of 16k and VBG CO2 of 88 with a ph of 7.33. On her previous admission due to a COPD exacerbation her CO2 was 88 with a ph of 7.13  Imaging for Trauma was negative besides routine degenerative changes.  The pts scalp laceration was sutured in the ED and she was given a 1 liter bolus and admitted to medicine.

## 2024-09-07 NOTE — ED PROVIDER NOTE - PROGRESS NOTE DETAILS
pk: labs imaging reviewed, laceration repaired with 3, 2-0 sutures, pt resting comfortably in no acute distress, speaking in complete sentences, no complaints, will admit for rehabilitation placement

## 2024-09-07 NOTE — ED PROVIDER NOTE - OBJECTIVE STATEMENT
Patient is a 78y Female pmhx HTN HLD DM COPD not on home O2, CAD status post stent 2019 discharged yesterday after admission for COPD exacerbation presenting to the ED s/p mechanical fall with HT this morning. States she remembers the fall

## 2024-09-08 LAB
ANION GAP SERPL CALC-SCNC: 5 MMOL/L — LOW (ref 7–14)
BASOPHILS # BLD AUTO: 0.01 K/UL — SIGNIFICANT CHANGE UP (ref 0–0.2)
BASOPHILS # BLD AUTO: 0.01 K/UL — SIGNIFICANT CHANGE UP (ref 0–0.2)
BASOPHILS # BLD AUTO: 0.02 K/UL — SIGNIFICANT CHANGE UP (ref 0–0.2)
BASOPHILS NFR BLD AUTO: 0.1 % — SIGNIFICANT CHANGE UP (ref 0–1)
BASOPHILS NFR BLD AUTO: 0.1 % — SIGNIFICANT CHANGE UP (ref 0–1)
BASOPHILS NFR BLD AUTO: 0.2 % — SIGNIFICANT CHANGE UP (ref 0–1)
BUN SERPL-MCNC: 17 MG/DL — SIGNIFICANT CHANGE UP (ref 10–20)
CALCIUM SERPL-MCNC: 8.5 MG/DL — SIGNIFICANT CHANGE UP (ref 8.4–10.5)
CHLORIDE SERPL-SCNC: 95 MMOL/L — LOW (ref 98–110)
CO2 SERPL-SCNC: 38 MMOL/L — HIGH (ref 17–32)
CREAT SERPL-MCNC: 0.5 MG/DL — LOW (ref 0.7–1.5)
EGFR: 95 ML/MIN/1.73M2 — SIGNIFICANT CHANGE UP
EOSINOPHIL # BLD AUTO: 0.09 K/UL — SIGNIFICANT CHANGE UP (ref 0–0.7)
EOSINOPHIL # BLD AUTO: 0.12 K/UL — SIGNIFICANT CHANGE UP (ref 0–0.7)
EOSINOPHIL # BLD AUTO: 0.13 K/UL — SIGNIFICANT CHANGE UP (ref 0–0.7)
EOSINOPHIL NFR BLD AUTO: 1 % — SIGNIFICANT CHANGE UP (ref 0–8)
EOSINOPHIL NFR BLD AUTO: 1.4 % — SIGNIFICANT CHANGE UP (ref 0–8)
EOSINOPHIL NFR BLD AUTO: 1.4 % — SIGNIFICANT CHANGE UP (ref 0–8)
GLUCOSE BLDC GLUCOMTR-MCNC: 118 MG/DL — HIGH (ref 70–99)
GLUCOSE BLDC GLUCOMTR-MCNC: 191 MG/DL — HIGH (ref 70–99)
GLUCOSE BLDC GLUCOMTR-MCNC: 354 MG/DL — HIGH (ref 70–99)
GLUCOSE SERPL-MCNC: 307 MG/DL — HIGH (ref 70–99)
HCT VFR BLD CALC: 37 % — SIGNIFICANT CHANGE UP (ref 37–47)
HCT VFR BLD CALC: 37 % — SIGNIFICANT CHANGE UP (ref 37–47)
HCT VFR BLD CALC: 37.5 % — SIGNIFICANT CHANGE UP (ref 37–47)
HGB BLD-MCNC: 11.6 G/DL — LOW (ref 12–16)
HGB BLD-MCNC: 11.6 G/DL — LOW (ref 12–16)
HGB BLD-MCNC: 11.9 G/DL — LOW (ref 12–16)
IMM GRANULOCYTES NFR BLD AUTO: 0.9 % — HIGH (ref 0.1–0.3)
IMM GRANULOCYTES NFR BLD AUTO: 1 % — HIGH (ref 0.1–0.3)
IMM GRANULOCYTES NFR BLD AUTO: 1.1 % — HIGH (ref 0.1–0.3)
LYMPHOCYTES # BLD AUTO: 1.49 K/UL — SIGNIFICANT CHANGE UP (ref 1.2–3.4)
LYMPHOCYTES # BLD AUTO: 1.83 K/UL — SIGNIFICANT CHANGE UP (ref 1.2–3.4)
LYMPHOCYTES # BLD AUTO: 1.85 K/UL — SIGNIFICANT CHANGE UP (ref 1.2–3.4)
LYMPHOCYTES # BLD AUTO: 16.2 % — LOW (ref 20.5–51.1)
LYMPHOCYTES # BLD AUTO: 19.4 % — LOW (ref 20.5–51.1)
LYMPHOCYTES # BLD AUTO: 21.7 % — SIGNIFICANT CHANGE UP (ref 20.5–51.1)
MCHC RBC-ENTMCNC: 30.1 PG — SIGNIFICANT CHANGE UP (ref 27–31)
MCHC RBC-ENTMCNC: 30.1 PG — SIGNIFICANT CHANGE UP (ref 27–31)
MCHC RBC-ENTMCNC: 30.5 PG — SIGNIFICANT CHANGE UP (ref 27–31)
MCHC RBC-ENTMCNC: 31.4 G/DL — LOW (ref 32–37)
MCHC RBC-ENTMCNC: 31.4 G/DL — LOW (ref 32–37)
MCHC RBC-ENTMCNC: 31.7 G/DL — LOW (ref 32–37)
MCV RBC AUTO: 94.9 FL — SIGNIFICANT CHANGE UP (ref 81–99)
MCV RBC AUTO: 96.1 FL — SIGNIFICANT CHANGE UP (ref 81–99)
MCV RBC AUTO: 97.4 FL — SIGNIFICANT CHANGE UP (ref 81–99)
MONOCYTES # BLD AUTO: 0.64 K/UL — HIGH (ref 0.1–0.6)
MONOCYTES # BLD AUTO: 0.65 K/UL — HIGH (ref 0.1–0.6)
MONOCYTES # BLD AUTO: 0.73 K/UL — HIGH (ref 0.1–0.6)
MONOCYTES NFR BLD AUTO: 6.7 % — SIGNIFICANT CHANGE UP (ref 1.7–9.3)
MONOCYTES NFR BLD AUTO: 7.1 % — SIGNIFICANT CHANGE UP (ref 1.7–9.3)
MONOCYTES NFR BLD AUTO: 8.6 % — SIGNIFICANT CHANGE UP (ref 1.7–9.3)
NEUTROPHILS # BLD AUTO: 5.68 K/UL — SIGNIFICANT CHANGE UP (ref 1.4–6.5)
NEUTROPHILS # BLD AUTO: 6.8 K/UL — HIGH (ref 1.4–6.5)
NEUTROPHILS # BLD AUTO: 6.84 K/UL — HIGH (ref 1.4–6.5)
NEUTROPHILS NFR BLD AUTO: 67.3 % — SIGNIFICANT CHANGE UP (ref 42.2–75.2)
NEUTROPHILS NFR BLD AUTO: 71.3 % — SIGNIFICANT CHANGE UP (ref 42.2–75.2)
NEUTROPHILS NFR BLD AUTO: 74.5 % — SIGNIFICANT CHANGE UP (ref 42.2–75.2)
NRBC # BLD: 0 /100 WBCS — SIGNIFICANT CHANGE UP (ref 0–0)
PLATELET # BLD AUTO: 159 K/UL — SIGNIFICANT CHANGE UP (ref 130–400)
PLATELET # BLD AUTO: 162 K/UL — SIGNIFICANT CHANGE UP (ref 130–400)
PLATELET # BLD AUTO: 178 K/UL — SIGNIFICANT CHANGE UP (ref 130–400)
PMV BLD: 11.4 FL — HIGH (ref 7.4–10.4)
PMV BLD: 11.5 FL — HIGH (ref 7.4–10.4)
PMV BLD: 11.6 FL — HIGH (ref 7.4–10.4)
POTASSIUM SERPL-MCNC: 3.4 MMOL/L — LOW (ref 3.5–5)
POTASSIUM SERPL-SCNC: 3.4 MMOL/L — LOW (ref 3.5–5)
RBC # BLD: 3.8 M/UL — LOW (ref 4.2–5.4)
RBC # BLD: 3.85 M/UL — LOW (ref 4.2–5.4)
RBC # BLD: 3.95 M/UL — LOW (ref 4.2–5.4)
RBC # FLD: 12.6 % — SIGNIFICANT CHANGE UP (ref 11.5–14.5)
RBC # FLD: 12.6 % — SIGNIFICANT CHANGE UP (ref 11.5–14.5)
RBC # FLD: 12.7 % — SIGNIFICANT CHANGE UP (ref 11.5–14.5)
SODIUM SERPL-SCNC: 138 MMOL/L — SIGNIFICANT CHANGE UP (ref 135–146)
WBC # BLD: 8.45 K/UL — SIGNIFICANT CHANGE UP (ref 4.8–10.8)
WBC # BLD: 9.18 K/UL — SIGNIFICANT CHANGE UP (ref 4.8–10.8)
WBC # BLD: 9.54 K/UL — SIGNIFICANT CHANGE UP (ref 4.8–10.8)
WBC # FLD AUTO: 8.45 K/UL — SIGNIFICANT CHANGE UP (ref 4.8–10.8)
WBC # FLD AUTO: 9.18 K/UL — SIGNIFICANT CHANGE UP (ref 4.8–10.8)
WBC # FLD AUTO: 9.54 K/UL — SIGNIFICANT CHANGE UP (ref 4.8–10.8)

## 2024-09-08 PROCEDURE — 99232 SBSQ HOSP IP/OBS MODERATE 35: CPT

## 2024-09-08 RX ORDER — POTASSIUM CHLORIDE 10 MEQ
40 TABLET, EXT RELEASE, PARTICLES/CRYSTALS ORAL ONCE
Refills: 0 | Status: COMPLETED | OUTPATIENT
Start: 2024-09-08 | End: 2024-09-09

## 2024-09-08 RX ADMIN — Medication 5: at 11:41

## 2024-09-08 RX ADMIN — FLUTICASONE PROPIONATE AND SALMETEROL 1 DOSE(S): 250; 50 POWDER RESPIRATORY (INHALATION) at 07:56

## 2024-09-08 RX ADMIN — Medication 40 MILLIGRAM(S): at 07:57

## 2024-09-08 RX ADMIN — TIOTROPIUM BROMIDE INHALATION SPRAY 2 PUFF(S): 3.12 SPRAY, METERED RESPIRATORY (INHALATION) at 07:59

## 2024-09-08 RX ADMIN — Medication 10 MILLIGRAM(S): at 23:23

## 2024-09-08 RX ADMIN — Medication 1: at 17:02

## 2024-09-08 NOTE — PROGRESS NOTE ADULT - ASSESSMENT
78-year-old female past medical history of COPD on home O2 PRN, current smoker, hypertension, hyperlipidemia, CAD status post stent 2019, hx of dysphagia and zenker diverticulum presents to the ED s/p mechanical fall with HT this morning. The pt was admitted to medicine for Placement as the family cannot care for the pt.       #mechanical fall  #head trauma  - As per pt and daughter the pt had a mechanical fall and mistepped in her restroom. The pt was able to get up herself and noticed profuse bleeding which brought her to the hospital.  - As per the daughter the pt should never have been discharged home and should have been discharged to rehab.  - S/p suturing of laceration in the ED, 3, 2-0 sutures  - C/w local wound care  - No LOC or abnormal movements before or after fall  - Mental status was at baseline before and after fall  - No chest pain or palpitations   - Pt for discharg plan and rehab  - CTH: no acute intracranial pathology, left parietal scalp soft tissue swelling with subcu hematoma  - CT c-spine: No evidence of acute cervical spine fracture or subluxation  - Local wound care  - Fall precautions  - PT/rehab  - CM informed patient and family requests STR    #Acute hemoglobin drop  - HgB 15.3->11.9 in setting of fall. Currently asymptomatic  - Repeat HgB, if true downtrend, will get CT AP to rule out bleed from trauma    #COPD  #baseline O2 requirement at home   - On 2l at home PRN  - VBG shows increased CO2 but normal pH vs previous admission with increased CO2 and Ph of 7.18  - Pt is compensated resp acidosis  - CXR does not show effusions or consolidations  - Previous exacerbation was due to parainfluenza  - nc to keep spo2 88-92; on home o2 prn  - C/w home inhalers while in hospital  - WBC likely reactive. Pt did receive prednisolone while admitted and received last dose yesterday morning No fevers and signs of infection.   - Monitor for now    #Leukocytosis  - Likely reactive, UA unremarkable, CXR negative, no fevers  - Monitor off abx fo rnow    #HLD  #CAD  #HTN  -Hold for now, BP borderline normal/low  - Can resume amlodipine if hypertensive  - C/w atorvas    #Zenker diverticulum.   - outpt f/u.    #DM  - on metformin at home  - C/w ss for now    DVT ppx- lovenox  GI ppx- not needed  diet- Dash/ CC

## 2024-09-08 NOTE — PROGRESS NOTE ADULT - ASSESSMENT
79 yo F with hx of HTN, HLD, CAD s/p stent (2019), Dysphagia/Zenker diverticulum, COPD (on home O2 prn), Current smoker presents to ED for mechanical fall with head trauma.     Scalp laceration 2/2 mechanical fall   - CTH: No evidence of acute intracranial pathology. Left parietal scalp soft tissue swelling with subcutaneous hematoma.  - CT c-spine: No evidence of acute cervical spine fracture or subluxation.  - s/p laceration repaired by ED.   - local wound care.   - fall precaution.   - PT/Rehab.   - CM informed pt and family requesting STR     Acute Anemia ( Hb b/l 14-15 --> 11.9 this AM )   - repeat Hb , if true downtrend then will need to get CT C/A/P to rule out bleed from any sort of traumatic pathology s/p fall     Recent admission for COPD exacerbation   - recently treated with steroid and abx.   - c/w home inhalers for now.   - supplemental O2 prn    Leukocytosis likely reactive   - No fever.   - UA unremarkable. CXR neg.   - monitor off abx for now.     HTN  - Hold anti-HTN medication for now as BP borderline currently. May resume on amlodipine if BP persistently elevated.     HLD - c/w home med.   DM II - monitor FS AC HS.  SSI for now, A1C 6.7   CAD s/p stent - c/w home med.     DVT ppx: Hold Lovenox for now given drop in hemoglobin, resume if acute bleeding is ruled out     Total time spent to complete patient's bedside assessment, review medical chart, discuss medical plan of care with covering medical team was more than 35 minutes  with >50% of time spent face to face with patient, discussion with patient/family and/or coordination of care. My note supersedes the medical resident note in case of discrepancy.       Sakshi Mcknight, DO

## 2024-09-09 LAB
ANION GAP SERPL CALC-SCNC: 6 MMOL/L — LOW (ref 7–14)
BUN SERPL-MCNC: 11 MG/DL — SIGNIFICANT CHANGE UP (ref 10–20)
CALCIUM SERPL-MCNC: 8.7 MG/DL — SIGNIFICANT CHANGE UP (ref 8.4–10.5)
CHLORIDE SERPL-SCNC: 99 MMOL/L — SIGNIFICANT CHANGE UP (ref 98–110)
CO2 SERPL-SCNC: 40 MMOL/L — HIGH (ref 17–32)
CREAT SERPL-MCNC: 0.5 MG/DL — LOW (ref 0.7–1.5)
EGFR: 95 ML/MIN/1.73M2 — SIGNIFICANT CHANGE UP
GLUCOSE BLDC GLUCOMTR-MCNC: 133 MG/DL — HIGH (ref 70–99)
GLUCOSE BLDC GLUCOMTR-MCNC: 137 MG/DL — HIGH (ref 70–99)
GLUCOSE BLDC GLUCOMTR-MCNC: 151 MG/DL — HIGH (ref 70–99)
GLUCOSE BLDC GLUCOMTR-MCNC: 153 MG/DL — HIGH (ref 70–99)
GLUCOSE SERPL-MCNC: 128 MG/DL — HIGH (ref 70–99)
HCT VFR BLD CALC: 36.9 % — LOW (ref 37–47)
HGB BLD-MCNC: 11.6 G/DL — LOW (ref 12–16)
MCHC RBC-ENTMCNC: 30.3 PG — SIGNIFICANT CHANGE UP (ref 27–31)
MCHC RBC-ENTMCNC: 31.4 G/DL — LOW (ref 32–37)
MCV RBC AUTO: 96.3 FL — SIGNIFICANT CHANGE UP (ref 81–99)
NRBC # BLD: 0 /100 WBCS — SIGNIFICANT CHANGE UP (ref 0–0)
PLATELET # BLD AUTO: 170 K/UL — SIGNIFICANT CHANGE UP (ref 130–400)
PMV BLD: 11.7 FL — HIGH (ref 7.4–10.4)
POTASSIUM SERPL-MCNC: 3.9 MMOL/L — SIGNIFICANT CHANGE UP (ref 3.5–5)
POTASSIUM SERPL-SCNC: 3.9 MMOL/L — SIGNIFICANT CHANGE UP (ref 3.5–5)
RBC # BLD: 3.83 M/UL — LOW (ref 4.2–5.4)
RBC # FLD: 12.7 % — SIGNIFICANT CHANGE UP (ref 11.5–14.5)
SODIUM SERPL-SCNC: 145 MMOL/L — SIGNIFICANT CHANGE UP (ref 135–146)
WBC # BLD: 8.03 K/UL — SIGNIFICANT CHANGE UP (ref 4.8–10.8)
WBC # FLD AUTO: 8.03 K/UL — SIGNIFICANT CHANGE UP (ref 4.8–10.8)

## 2024-09-09 PROCEDURE — 99232 SBSQ HOSP IP/OBS MODERATE 35: CPT

## 2024-09-09 PROCEDURE — 73562 X-RAY EXAM OF KNEE 3: CPT | Mod: 26,50

## 2024-09-09 RX ORDER — ENOXAPARIN SODIUM 100 MG/ML
40 INJECTION SUBCUTANEOUS EVERY 24 HOURS
Refills: 0 | Status: DISCONTINUED | OUTPATIENT
Start: 2024-09-09 | End: 2024-09-11

## 2024-09-09 RX ADMIN — Medication 1: at 08:10

## 2024-09-09 RX ADMIN — TIOTROPIUM BROMIDE INHALATION SPRAY 2 PUFF(S): 3.12 SPRAY, METERED RESPIRATORY (INHALATION) at 07:50

## 2024-09-09 RX ADMIN — Medication 10 MILLIGRAM(S): at 21:10

## 2024-09-09 RX ADMIN — ENOXAPARIN SODIUM 40 MILLIGRAM(S): 100 INJECTION SUBCUTANEOUS at 18:41

## 2024-09-09 RX ADMIN — Medication 40 MILLIGRAM(S): at 06:31

## 2024-09-09 RX ADMIN — Medication 40 MILLIEQUIVALENT(S): at 18:41

## 2024-09-09 NOTE — PROGRESS NOTE ADULT - ASSESSMENT
79 yo F with hx of HTN, HLD, CAD s/p stent (2019), Dysphagia/Zenker diverticulum, COPD (on home O2 prn), Current smoker presents to ED for mechanical fall with head trauma.     Scalp laceration 2/2 mechanical fall   Acute Anemia ( Hb b/l 14-15 --> 11.9 ) due to severe bleeding from scalp laceration prior to admission   - CTH: No evidence of acute intracranial pathology. Left parietal scalp soft tissue swelling with subcutaneous hematoma.  - CT c-spine: No evidence of acute cervical spine fracture or subluxation.  - s/p laceration repaired by ED.   - local wound care.   - fall precaution.   - PT/Rehab.   - CM informed pt and family requesting STR     Recent admission for COPD exacerbation   - recently treated with steroid and abx.   - c/w home inhalers for now.   - supplemental O2 prn    Leukocytosis likely reactive - resolved     HTN  - Hold anti-HTN medication for now as BP borderline currently. May resume on amlodipine if BP persistently elevated.     HLD - c/w home med.   DM II - monitor FS AC HS.  SSI for now, A1C 6.7   CAD s/p stent - c/w home med.     DVT ppx: lovenox     Total time spent to complete patient's bedside assessment, review medical chart, discuss medical plan of care with covering medical team was more than 35 minutes  with >50% of time spent face to face with patient, discussion with patient/family and/or coordination of care. My note supersedes the medical resident note in case of discrepancy.       Sakshi Mcknight DO

## 2024-09-09 NOTE — PHYSICAL THERAPY INITIAL EVALUATION ADULT - DIAGNOSIS, PT EVAL
Order received from Dr. Ney Mc for IR guided bone marrow biopsy. Diagnoses Anemia of chronic disease     Patient: Crystal Rodriguez  MRN #: [id number]  Age: 71 year old  : 1950  Date: 2021    Past Medical History:   Diagnosis Date   • Anemia    • Anxiety    • Blood clot associated with vein wall inflammation     blood clots in legs , yrs ago, needed blood thinning , bleeds easily    • Chronic kidney disease     stage 3   • Congestive cardiac failure (CMS/Formerly [facility])    • Diverticulosis    • HTN (hypertension)    • Hypercalcemia 10/3/2018   • Hyperlipidemia    • MGUS (monoclonal gammopathy of unknown significance) 2019    M protein spike   • Morbid obesity (CMS/Formerly [facility])     BMI:46   • OA (osteoarthritis)     s/p left knee replacement   • KRISTEN (obstructive sleep apnea) 3/11/2015    on CPAP   • PMR (polymyalgia rheumatica) (CMS/Formerly [facility]) 2016    Polymyalgia rheumatica/seronegative inflammatory polyarthritis involving the MCPs, wrists, ankles, MTPs, elevated inflammation markers, negative rheumatoid factor, CCP, OSCAR, normal ACE level, periarticular osteopenia on hand x-rays 2016; on prednisone since 2015. Off Prednisone since 2017. Hydroxychloroquine started 2016. Sulfasalazine started 2017. Eye exam with Dr. 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 Garrett   • Cataract extraction w/ intraocular lens implant Right 2021    Dr. Tobi Garrett   • Cholecystectomy N/A  04/05/2017    laparoscopic cholecystectomy   • Colonoscopy  05/2021    repeat in 10 years    • Colonoscopy w/ polypectomy  03/14/2014    Repeat in five years.   • Colonoscopy w/ polypectomy  03/18/2019    repeat 5 yrs per Dr. Vargas - hx: colon polyps   • Dexa bone density axial skeleton  08/25/2008   • Egd  05/2021    repeat in 90 days.    • Esophagogastroduodenoscopy  03/18/2019   • Total abdom hysterectomy  03/11/2008    secondary to endometriosis   • Total knee arthroplasty      right   • Total knee replacement      left   • Us radio frequency ablations nr  2018    x2   • Us radio frequency ablations nr  12/05/2018    RFA's L2,3,4,5 - [facility]       ALLERGIES:   Allergen Reactions   • Mold   (Environmental) Cough       Current Outpatient Medications   Medication Sig Dispense Refill   • doxycycline hyclate (VIBRA-TABS) 100 MG tablet Take 1 tablet by mouth 2 times daily for 7 days. 14 tablet 0   • pantoprazole (PROTONIX) 40 MG tablet TAKE 1 TABLET BY MOUTH DAILY 90 tablet 1   • torsemide (DEMADEX) 10 MG tablet Take 1 tablet by mouth daily. 180 tablet 1   • potassium CHLORIDE (KLOR-CON M) 20 MEQ esther ER tablet Take 2 tablets by mouth daily. 180 tablet 1   • metoPROLOL succinate (TOPROL-XL) 100 MG 24 hr tablet TAKE 1 TABLET BY MOUTH DAILY 90 tablet 0   • losartan (COZAAR) 100 MG tablet Take 0.5 tablets by mouth daily. Dose decrease 3/1/21 90 tablet 3   • levothyroxine 125 MCG tablet Take 1 tablet by mouth daily. Except Sunday take half tablet. Dose adjustment on 5/21/2019 90 tablet 3   • rosuvastatin (Crestor) 20 MG tablet Take 1 tablet by mouth daily. (Patient taking differently: Take 20 mg by mouth every other day. ) 90 tablet 3   • sertraline (ZOLOFT) 50 MG tablet Take 1.5 tablets po daily 135 tablet 0   • allopurinol (ZYLOPRIM) 100 MG tablet Take 1 tablet by mouth daily. Dose decrease 7/28/2020 135 tablet 3   • vitamin B-12 (CYANOCOBALAMIN) 1000 MCG tablet Take 2,000 mcg by mouth 3 days a week.       • leflunomide (ARAVA) 20 MG tablet Take 1 tablet by mouth every morning. Given by rheumatologist Dr. Dupont     • ALPRAZolam (XANAX) 0.25 MG tablet 1 tab 2 x a day (Patient taking differently: Take 0.25 mg by mouth as needed. 1 tab 2 x a day) 60 tablet 5   • Ferrous Sulfate (IRON) 325 (65 FE) MG Tab Take 325 mg by mouth daily.      • folic acid (FOLATE) 400 MCG tablet Take 1 tablet by mouth daily.     • Omega-3 Fatty Acids (FISH OIL PO) Take 2,000 Units by mouth daily.       No current facility-administered medications for this visit.       Date of Order Received: 6/11/21    Plan: bone marrow biopsy    Triaging Provider: Megha AU    Performing Physician: Any     Anesthesia / Sedation requirement : moderate    Blood thinner: NA    Modality: CT    STAT/ASAP/Routine: ROUTINE      Procedure date: 6/18/21  Arrival time: 0700  Procedure time: 0830  NPO solids: MN  NPO liquids: MN    Last bone marrow biopsy 10/21/20  (versed 4/fent 175)       fall

## 2024-09-09 NOTE — PHYSICAL THERAPY INITIAL EVALUATION ADULT - ASSISTIVE DEVICE FOR TRANSFER: GAIT, REHAB EVAL
Melba Vazquez is a 61 year old female presenting with staple removal from her scalp. Denies any pain in the area and no fevers. She reports the area is good and she has no questions    Reports  known Latex allergy or symptoms of Latex sensitivity.  Tobacco hx, allergies and medications reviewed.  PCP verified:  none  Pharmacy verified:  [facility]     Patient would like communication of their results via:      Home Phone: [phone] (home)  Okay to leave a message containing results? Yes      PPE worn by writer: gloves, face shield/goggles and level 3 procedure mask    
Wound examined, healing well, staples removed; f/u prn.  Original staples placed at [facility], 4/21/21      
unilateral handheld assist, pt deferred use of RW

## 2024-09-09 NOTE — PHYSICAL THERAPY INITIAL EVALUATION ADULT - GAIT DEVIATIONS NOTED, PT EVAL
decreased braulio/increased time in double stance/decreased step length/decreased weight-shifting ability

## 2024-09-09 NOTE — PHYSICAL THERAPY INITIAL EVALUATION ADULT - PERTINENT HX OF CURRENT PROBLEM, REHAB EVAL
78-year-old female past medical history of COPD on home O2 PRN, current smoker, hypertension, hyperlipidemia, CAD status post stent 2019, hx of dysphagia and zenker diverticulum presents to the ED s/p mechanical fall with HT this morning. The pt was recently admitted to the hospital for a COPD exacerbation and was treated with antibiotics, steroids and Nebulizers. The pt had a laceration on her head that was repaired by the ED. The pts baseline mental status was unaffected before and after the fall. No LOC, chest pain, shortness of breath or abnormal movements before and after the fall.

## 2024-09-09 NOTE — PROGRESS NOTE ADULT - ASSESSMENT
77 yo F with hx of HTN, HLD, CAD s/p stent (2019), Dysphagia/Zenker diverticulum, COPD (on home O2 prn), Current smoker presents to ED for mechanical fall with head trauma.     # Scalp laceration 2/2 mechanical fall   # Acute Anemia ( Hb b/l 14-15 > 11.9 ) due to severe bleeding from scalp laceration prior to admission   - CTH: No evidence of acute intracranial pathology. Left parietal scalp soft tissue swelling with subcutaneous hematoma.  - CT c-spine: No evidence of acute cervical spine fracture or subluxation.  - s/p laceration repaired by ED.   - local wound care.   - fall precaution.   - continue with PT/Rehab.   - CM informed pt and family requesting STR , Janelle Stanford is interested. CM attempted to contact Janelle Stanford multiple awaiting response  - Gait - wobbly, present before fall, OE bilateral LE weakness, pt has knee pain- Follow up xray right knee.   - neurochecks q8h    # Recent admission for COPD exacerbation   - recently treated with steroid and abx.   - c/w home inhalers for now.   - supplemental O2 prn    # Leukocytosis likely reactive - resolved     # HTN  - Hold anti-HTN medication for now as BP borderline currently. May resume on amlodipine if BP persistently elevated.     # HLD   - c/w home med.     # DM II   - monitor FS AC HS.  SSI for now, A1C 6.7     # CAD s/p stent   - c/w home med.     # DVT ppx: lovenox     Progress note handoff- CBC monitoring for Hb, awaiting STR placement, Follow up knee xray,

## 2024-09-09 NOTE — PHYSICAL THERAPY INITIAL EVALUATION ADULT - ADDITIONAL COMMENTS
Pt lives with son in house with 1 flight of stairs to enter, 1 level inside. Hx of fall prompting this admission. Pt was admitted ~1-2 weeks ago, was independent in ambulation and ADLs, d/c home with RW.

## 2024-09-09 NOTE — PHYSICAL THERAPY INITIAL EVALUATION ADULT - IMPAIRMENTS CONTRIBUTING TO GAIT DEVIATIONS, PT EVAL
impaired balance/impaired coordination/narrow base of support/impaired postural control/scissoring/decreased strength

## 2024-09-10 ENCOUNTER — TRANSCRIPTION ENCOUNTER (OUTPATIENT)
Age: 79
End: 2024-09-10

## 2024-09-10 LAB
ANION GAP SERPL CALC-SCNC: 7 MMOL/L — SIGNIFICANT CHANGE UP (ref 7–14)
BUN SERPL-MCNC: 10 MG/DL — SIGNIFICANT CHANGE UP (ref 10–20)
CALCIUM SERPL-MCNC: 8.9 MG/DL — SIGNIFICANT CHANGE UP (ref 8.4–10.5)
CHLORIDE SERPL-SCNC: 99 MMOL/L — SIGNIFICANT CHANGE UP (ref 98–110)
CO2 SERPL-SCNC: 37 MMOL/L — HIGH (ref 17–32)
CREAT SERPL-MCNC: 0.6 MG/DL — LOW (ref 0.7–1.5)
EGFR: 91 ML/MIN/1.73M2 — SIGNIFICANT CHANGE UP
GLUCOSE BLDC GLUCOMTR-MCNC: 111 MG/DL — HIGH (ref 70–99)
GLUCOSE BLDC GLUCOMTR-MCNC: 113 MG/DL — HIGH (ref 70–99)
GLUCOSE BLDC GLUCOMTR-MCNC: 163 MG/DL — HIGH (ref 70–99)
GLUCOSE BLDC GLUCOMTR-MCNC: 204 MG/DL — HIGH (ref 70–99)
GLUCOSE SERPL-MCNC: 145 MG/DL — HIGH (ref 70–99)
HCT VFR BLD CALC: 38.8 % — SIGNIFICANT CHANGE UP (ref 37–47)
HGB BLD-MCNC: 11.9 G/DL — LOW (ref 12–16)
MCHC RBC-ENTMCNC: 30.3 PG — SIGNIFICANT CHANGE UP (ref 27–31)
MCHC RBC-ENTMCNC: 30.7 G/DL — LOW (ref 32–37)
MCV RBC AUTO: 98.7 FL — SIGNIFICANT CHANGE UP (ref 81–99)
NRBC # BLD: 0 /100 WBCS — SIGNIFICANT CHANGE UP (ref 0–0)
PLATELET # BLD AUTO: 195 K/UL — SIGNIFICANT CHANGE UP (ref 130–400)
PMV BLD: 11.7 FL — HIGH (ref 7.4–10.4)
POTASSIUM SERPL-MCNC: 4.2 MMOL/L — SIGNIFICANT CHANGE UP (ref 3.5–5)
POTASSIUM SERPL-SCNC: 4.2 MMOL/L — SIGNIFICANT CHANGE UP (ref 3.5–5)
RBC # BLD: 3.93 M/UL — LOW (ref 4.2–5.4)
RBC # FLD: 12.7 % — SIGNIFICANT CHANGE UP (ref 11.5–14.5)
SODIUM SERPL-SCNC: 143 MMOL/L — SIGNIFICANT CHANGE UP (ref 135–146)
WBC # BLD: 8.04 K/UL — SIGNIFICANT CHANGE UP (ref 4.8–10.8)
WBC # FLD AUTO: 8.04 K/UL — SIGNIFICANT CHANGE UP (ref 4.8–10.8)

## 2024-09-10 PROCEDURE — 99231 SBSQ HOSP IP/OBS SF/LOW 25: CPT

## 2024-09-10 RX ADMIN — TIOTROPIUM BROMIDE INHALATION SPRAY 2 PUFF(S): 3.12 SPRAY, METERED RESPIRATORY (INHALATION) at 16:50

## 2024-09-10 RX ADMIN — Medication 10 MILLIGRAM(S): at 21:16

## 2024-09-10 RX ADMIN — Medication 0.5 MILLIGRAM(S): at 21:15

## 2024-09-10 RX ADMIN — FLUTICASONE PROPIONATE AND SALMETEROL 1 DOSE(S): 250; 50 POWDER RESPIRATORY (INHALATION) at 17:31

## 2024-09-10 RX ADMIN — ENOXAPARIN SODIUM 40 MILLIGRAM(S): 100 INJECTION SUBCUTANEOUS at 18:43

## 2024-09-10 RX ADMIN — Medication 40 MILLIGRAM(S): at 06:09

## 2024-09-10 NOTE — ADVANCED PRACTICE NURSE CONSULT - RECOMMEDATIONS
Cleanse wound with soap (shampoo) and water, pat dry.  Leave open to air and monitor for signs of infection - As per MD note who placed stitches.  Per Procedure note patient to follow up with primary MD to have stitches removed.  Skin and incontinence care.  Pressure Injury Prevention.  Assess wound daily and inform primary provider of any changes.   Case discussed with primary RN.  Wound/ ostomy specialist to f/u as needed.   Other recommendations per Primary Team.

## 2024-09-10 NOTE — OCCUPATIONAL THERAPY INITIAL EVALUATION ADULT - ADDITIONAL COMMENTS
Pt resides in private home with adult son, + flight of stairs to enter home, + stall shower with grab bar and seat, + driving

## 2024-09-10 NOTE — DISCHARGE NOTE PROVIDER - HOSPITAL COURSE
History of Presenting Illness:   78-year-old female past medical history of COPD on home O2 PRN, current smoker, hypertension, hyperlipidemia, CAD status post stent 2019, hx of dysphagia and zenker diverticulum presents to the ED s/p mechanical fall with HT this morning. The pt was recently admitted to the hospital for a COPD exacerbation and was treated with antibiotics, steroids and Nebulizers. The pt had a laceration on her head that was repaired by the ED. The pts baseline mental status was unaffected before and after the fall. No LOC, chest pain, shortness of breath or abnormal movements before and after the fall    Labs were significant for WBC of 16k and VBG CO2 of 88 with a ph of 7.33. On her previous admission due to a COPD exacerbation her CO2 was 88 with a ph of 7.13  Imaging for Trauma was negative besides routine degenerative changes.  The pts scalp laceration was sutured in the ED and she was given a 1 liter bolus and admitted to medicine.    Hospital Course:   Patient was admitted for monitoring.   # Scalp laceration 2/2 mechanical fall   # Acute Anemia ( Hb b/l 14-15 > 11.9 ) due to severe bleeding from scalp laceration prior to admission   - CTH: No evidence of acute intracranial pathology. Left parietal scalp soft tissue swelling with subcutaneous hematoma.  - CT c-spine: No evidence of acute cervical spine fracture or subluxation.  - s/p laceration repaired by ED.   - local wound care was done.  - fall precaution was done.   - continue with PT/Rehab.       Patient is stable to be discharged.        History of Presenting Illness:   78-year-old female past medical history of COPD on home O2 PRN, current smoker, hypertension, hyperlipidemia, CAD status post stent 2019, hx of dysphagia and zenker diverticulum presents to the ED s/p mechanical fall with HT this morning. The pt was recently admitted to the hospital for a COPD exacerbation and was treated with antibiotics, steroids and Nebulizers. The pt had a laceration on her head that was repaired by the ED. The pts baseline mental status was unaffected before and after the fall. No LOC, chest pain, shortness of breath or abnormal movements before and after the fall    Labs were significant for WBC of 16k and VBG CO2 of 88 with a ph of 7.33. On her previous admission due to a COPD exacerbation her CO2 was 88 with a ph of 7.13  Imaging for Trauma was negative besides routine degenerative changes.  The pts scalp laceration was sutured in the ED and she was given a 1 liter bolus and admitted to medicine.    Hospital Course:   Patient was admitted for monitoring.   # Scalp laceration 2/2 mechanical fall   # Acute Anemia ( Hb b/l 14-15 > 11.9 ) due to severe bleeding from scalp laceration prior to admission   - CTH: No evidence of acute intracranial pathology. Left parietal scalp soft tissue swelling with subcutaneous hematoma.  - CT c-spine: No evidence of acute cervical spine fracture or subluxation.  - s/p laceration repaired by ED.   - local wound care was done.  - fall precaution was done.   - continue with PT/Rehab.       Patient is stable to be discharged.     Discussion of discharge plan of care, including discharge diagnoses, medication reconciliation, and follow-ups was conducted with Dr. Baltazar on 9/11/24, and discharge was approved.

## 2024-09-10 NOTE — DISCHARGE NOTE PROVIDER - CARE PROVIDER_API CALL
Daphne Mckay  Internal Medicine  [address] [place] [id number]  Phone: [phone]  Fax: [phone]  Follow Up Time: 1 week

## 2024-09-10 NOTE — PROGRESS NOTE ADULT - ASSESSMENT
77 yo F with hx of HTN, HLD, CAD s/p stent (2019), Dysphagia/Zenker diverticulum, COPD (on home O2 prn), Current smoker presents to ED for mechanical fall with head trauma.     Scalp laceration 2/2 mechanical fall   Acute Anemia ( Hb b/l 14-15 --> 11.9 ) due to severe bleeding from scalp laceration prior to admission   - CTH: No evidence of acute intracranial pathology. Left parietal scalp soft tissue swelling with subcutaneous hematoma.  - CT c-spine: No evidence of acute cervical spine fracture or subluxation.  - s/p laceration repaired by ED.   - local wound care.   - fall precaution.   - PT/Rehab.   - CM informed pt and family requesting STR     Recent admission for COPD exacerbation   - recently treated with steroid and abx.   - c/w home inhalers for now.   - supplemental O2 prn    Leukocytosis likely reactive - resolved     HTN  - Hold anti-HTN medication for now as BP borderline currently. May resume on amlodipine if BP persistently elevated.     HLD - c/w home med.   DM II - monitor FS AC HS.  SSI for now, A1C 6.7   CAD s/p stent - c/w home med.     DVT ppx: lovenox     Dispo: pending placement in STR    Total time spent to complete patient's bedside assessment, review medical chart, discuss medical plan of care with covering medical team was more than 25 minutes  with >50% of time spent face to face with patient, discussion with patient/family and/or coordination of care. My note supersedes the medical resident note in case of discrepancy.       Sakshi Mcknight,

## 2024-09-10 NOTE — DISCHARGE NOTE PROVIDER - NSDCCPCAREPLAN_GEN_ALL_CORE_FT
PRINCIPAL DISCHARGE DIAGNOSIS  Diagnosis: Fall  Assessment and Plan of Treatment: You were admitted to the hospital for fall and scalp laceration. You were monitored in the hospital. Imaging was negative. MEdications were reviewed.   - CTH: No evidence of acute intracranial pathology. Left parietal scalp soft tissue swelling with subcutaneous hematoma.  - CT C spiine: No evidence of acute cervical spine fracture or subluxation.  You worked with physcial therapy and being discharged on rehab.   Please take the medications as prescribed   Please follow up with the appointments.        SECONDARY DISCHARGE DIAGNOSES  Diagnosis: Scalp laceration  Assessment and Plan of Treatment:      PRINCIPAL DISCHARGE DIAGNOSIS  Diagnosis: Fall  Assessment and Plan of Treatment: You were admitted to the hospital for fall and scalp laceration. You were monitored in the hospital. Imaging was negative. MEdications were reviewed.   - CTH: No evidence of acute intracranial pathology. Left parietal scalp soft tissue swelling with subcutaneous hematoma.  - CT C spiine: No evidence of acute cervical spine fracture or subluxation.  You worked with physcial therapy and being discharged on rehab.   Please take the medications as prescribed   Please follow up with the appointments.  Seek Medical Attention If:  You have fallen and are unconscious.  fallen and cannot move part of your body.  You have fallen and have pain or a headache.  Fall prevention tips:  Stand or sit up slowly.  Use assistive devices as directed.   You may need to have grab bars put in your bathroom near the toilet or in the shower.  Wear shoes that fit well and have soles that . Wear shoes both inside and outside. Do not wear shoes with high heels.  Wear a personal alarm that can call 911 in an emergency.   Manage your medical conditions. Keep all appointments with your healthcare providers. Visit your eye doctor as directed.        SECONDARY DISCHARGE DIAGNOSES  Diagnosis: Scalp laceration  Assessment and Plan of Treatment:

## 2024-09-10 NOTE — DISCHARGE NOTE PROVIDER - ATTENDING DISCHARGE PHYSICAL EXAMINATION:
Gen- elderly F, NAD, non toxic appearing  Head- ~2-3cm laceration on crown of head.   Chest- symmetrical chest rise, no accessory muscle use  Neuro- AOx3, normal mentation  Ext- spontaneously moving all 4 ext against gravity

## 2024-09-10 NOTE — DISCHARGE NOTE PROVIDER - REASON FOR ADMISSION
[facility]    Progress Note      Assessment and Plan:     1. 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   The patient had significant respira 11/21/2021    HGB 12.4 11/21/2021    HCT 39.2 11/21/2021    .0 11/21/2021    CREATSERUM 0.60 11/21/2021    BUN 17 11/21/2021     11/21/2021    K 4.6 11/21/2021     11/21/2021    CO2 30.0 11/21/2021     11/21/2021    CA 9.0 11/21/2 Mechanical fall

## 2024-09-10 NOTE — OCCUPATIONAL THERAPY INITIAL EVALUATION ADULT - TRANSFER TRAINING, PT EVAL
Pt will perform sit<>stand and bed<>chair transfers with supervision using most appropriate AD by discharge

## 2024-09-10 NOTE — OCCUPATIONAL THERAPY INITIAL EVALUATION ADULT - GENERAL OBSERVATIONS, REHAB EVAL
Pt encountered long sitting in bed, + IV locked, + 2L O2 via NC. Pt agreeable to bedside OT assessment, may be seen as confirmed with RN. Pt returned to bedside recliner, + IV locked, + 2L O2 via NC. + chair alarm

## 2024-09-10 NOTE — ADVANCED PRACTICE NURSE CONSULT - ASSESSMENT
Reason for Admission: Mechanical fall  History of Present Illness:   78-year-old female past medical history of COPD on home O2 PRN, current smoker, hypertension, hyperlipidemia, CAD status post stent 2019, hx of dysphagia and zenker diverticulum presents to the ED s/p mechanical fall with HT this morning. The pt was recently admitted to the hospital for a COPD exacerbation and was treated with antibiotics, steroids and Nebulizers. The pt had a laceration on her head that was repaired by the ED. The pts baseline mental status was unaffected before and after the fall. No LOC, chest pain, shortness of breath or abnormal movements before and after the fall.         Allergies:  	No Known Allergies:     Home Medications:   * Patient Currently Takes Medications as of 06-Sep-2024 13:30 documented in Structured Notes  •?	pantoprazole 40 mg oral delayed release tablet: 1 tab(s) orally once a day before breakfast  •?	amLODIPine 5 mg oral tablet: 1 tab(s) orally once a day  •?	ALPRAZolam 0.5 mg oral tablet: 1 tab(s) orally once a day (at bedtime) as needed for  anxiety  •?	metFORMIN 500 mg oral tablet: 1 tab(s) orally 2 times a day  •?	atorvastatin 10 mg oral tablet: 1 tab(s) orally once a day  •?	Anoro Ellipta 62.5 mcg-25 mcg/inh inhalation powder: 1 puff(s) inhaled once a day  •?	Trelegy Ellipta 100 mcg-62.5 mcg-25 mcg/inh inhalation powder: 1 puff(s) inhaled 2 times a day    Patient received in chair, limited mobility, high risk for pressure injury development or progression.    Stitches to occiput – S/P fall  -	Well approximated, no drainage, pain on palpation or discharge noted. Area has ecchymosis.

## 2024-09-10 NOTE — PROGRESS NOTE ADULT - ASSESSMENT
79 yo F with hx of HTN, HLD, CAD s/p stent (2019), Dysphagia/Zenker diverticulum, COPD (on home O2 prn), Current smoker presents to ED for mechanical fall with head trauma.     # Scalp laceration 2/2 mechanical fall   # Acute Anemia ( Hb b/l 14-15 > 11.9 ) due to severe bleeding from scalp laceration prior to admission   - CTH: No evidence of acute intracranial pathology. Left parietal scalp soft tissue swelling with subcutaneous hematoma.  - CT c-spine: No evidence of acute cervical spine fracture or subluxation.  - s/p laceration repaired by ED.   - local wound care.   - fall precaution.   - continue with PT/Rehab.   - CM informed pt and family requesting STR , Janelle Stanford is interested. CM attempted to contact Janelle Stanford multiple awaiting response  - Gait - wobbly, present before fall, OE bilateral LE weakness, pt has knee pain- Follow up xray right knee.   - neurochecks q8h    # Recent admission for COPD exacerbation   - recently treated with steroid and abx.   - c/w home inhalers for now.   - supplemental O2 prn    # Leukocytosis likely reactive - resolved     # HTN  - Hold anti-HTN medication for now as BP borderline currently. May resume on amlodipine if BP persistently elevated.     # HLD   - c/w home med.     # DM II   - monitor FS AC HS.  SSI for now, A1C 6.7     # CAD s/p stent   - c/w home med.     # DVT ppx: lovenox     Progress note handoff- CBC monitoring for Hb, awaiting STR placement, Follow up knee xray,

## 2024-09-11 ENCOUNTER — TRANSCRIPTION ENCOUNTER (OUTPATIENT)
Age: 79
End: 2024-09-11

## 2024-09-11 VITALS
OXYGEN SATURATION: 98 % | SYSTOLIC BLOOD PRESSURE: 122 MMHG | HEART RATE: 64 BPM | DIASTOLIC BLOOD PRESSURE: 68 MMHG | RESPIRATION RATE: 18 BRPM | TEMPERATURE: 97 F

## 2024-09-11 LAB
-  AMPICILLIN: SIGNIFICANT CHANGE UP
-  CIPROFLOXACIN: SIGNIFICANT CHANGE UP
-  LEVOFLOXACIN: SIGNIFICANT CHANGE UP
-  NITROFURANTOIN: SIGNIFICANT CHANGE UP
-  TETRACYCLINE: SIGNIFICANT CHANGE UP
-  VANCOMYCIN: SIGNIFICANT CHANGE UP
ANION GAP SERPL CALC-SCNC: 7 MMOL/L — SIGNIFICANT CHANGE UP (ref 7–14)
BUN SERPL-MCNC: 10 MG/DL — SIGNIFICANT CHANGE UP (ref 10–20)
CALCIUM SERPL-MCNC: 8.4 MG/DL — SIGNIFICANT CHANGE UP (ref 8.4–10.4)
CHLORIDE SERPL-SCNC: 101 MMOL/L — SIGNIFICANT CHANGE UP (ref 98–110)
CO2 SERPL-SCNC: 37 MMOL/L — HIGH (ref 17–32)
CREAT SERPL-MCNC: 0.6 MG/DL — LOW (ref 0.7–1.5)
CULTURE RESULTS: ABNORMAL
EGFR: 91 ML/MIN/1.73M2 — SIGNIFICANT CHANGE UP
GLUCOSE BLDC GLUCOMTR-MCNC: 118 MG/DL — HIGH (ref 70–99)
GLUCOSE BLDC GLUCOMTR-MCNC: 242 MG/DL — HIGH (ref 70–99)
GLUCOSE SERPL-MCNC: 104 MG/DL — HIGH (ref 70–99)
HCT VFR BLD CALC: 32.1 % — LOW (ref 37–47)
HGB BLD-MCNC: 10.3 G/DL — LOW (ref 12–16)
MCHC RBC-ENTMCNC: 30.6 PG — SIGNIFICANT CHANGE UP (ref 27–31)
MCHC RBC-ENTMCNC: 32.1 G/DL — SIGNIFICANT CHANGE UP (ref 32–37)
MCV RBC AUTO: 95.3 FL — SIGNIFICANT CHANGE UP (ref 81–99)
METHOD TYPE: SIGNIFICANT CHANGE UP
NRBC # BLD: 0 /100 WBCS — SIGNIFICANT CHANGE UP (ref 0–0)
ORGANISM # SPEC MICROSCOPIC CNT: ABNORMAL
ORGANISM # SPEC MICROSCOPIC CNT: SIGNIFICANT CHANGE UP
PLATELET # BLD AUTO: 181 K/UL — SIGNIFICANT CHANGE UP (ref 130–400)
PMV BLD: 11.4 FL — HIGH (ref 7.4–10.4)
POTASSIUM SERPL-MCNC: 4.1 MMOL/L — SIGNIFICANT CHANGE UP (ref 3.5–5)
POTASSIUM SERPL-SCNC: 4.1 MMOL/L — SIGNIFICANT CHANGE UP (ref 3.5–5)
RBC # BLD: 3.37 M/UL — LOW (ref 4.2–5.4)
RBC # FLD: 12.8 % — SIGNIFICANT CHANGE UP (ref 11.5–14.5)
SODIUM SERPL-SCNC: 145 MMOL/L — SIGNIFICANT CHANGE UP (ref 135–146)
SPECIMEN SOURCE: SIGNIFICANT CHANGE UP
WBC # BLD: 6.67 K/UL — SIGNIFICANT CHANGE UP (ref 4.8–10.8)
WBC # FLD AUTO: 6.67 K/UL — SIGNIFICANT CHANGE UP (ref 4.8–10.8)

## 2024-09-11 PROCEDURE — 99239 HOSP IP/OBS DSCHRG MGMT >30: CPT

## 2024-09-11 RX ADMIN — TIOTROPIUM BROMIDE INHALATION SPRAY 2 PUFF(S): 3.12 SPRAY, METERED RESPIRATORY (INHALATION) at 07:44

## 2024-09-11 RX ADMIN — Medication 2: at 12:01

## 2024-09-11 RX ADMIN — FLUTICASONE PROPIONATE AND SALMETEROL 1 DOSE(S): 250; 50 POWDER RESPIRATORY (INHALATION) at 12:01

## 2024-09-11 RX ADMIN — Medication 40 MILLIGRAM(S): at 08:18

## 2024-09-11 NOTE — PROGRESS NOTE ADULT - ASSESSMENT
77 yo F with hx of HTN, HLD, CAD s/p stent (2019), Dysphagia/Zenker diverticulum, COPD (on home O2 prn), Current smoker presents to ED for mechanical fall with head trauma.     # Scalp laceration 2/2 mechanical fall   # Acute Anemia ( Hb b/l 14-15 > 11.9 ) due to severe bleeding from scalp laceration prior to admission   - CTH: No evidence of acute intracranial pathology. Left parietal scalp soft tissue swelling with subcutaneous hematoma.  - CT c-spine: No evidence of acute cervical spine fracture or subluxation.  - s/p laceration repaired by ED.   - local wound care.   - fall precaution.   - continue with PT/Rehab.   - CM informed pt and family requesting STR , Janelle Stanford is interested. CM attempted to contact Janelle Stanford multiple awaiting response  - Gait - wobbly, present before fall, OE bilateral LE weakness, pt has knee pain- Follow up xray right knee.   - Knee xray: osteoarthritis bilateral knee  - neurochecks q8h    # Recent admission for COPD exacerbation   - recently treated with steroid and abx.   - c/w home inhalers for now.   - supplemental O2 prn    # Leukocytosis likely reactive - resolved     # HTN  - Hold anti-HTN medication for now as BP borderline currently. May resume on amlodipine if BP persistently elevated.     # HLD   - c/w home med.     # DM II   - monitor FS AC HS.  SSI for now, A1C 6.7     # CAD s/p stent   - c/w home med.     # DVT ppx: lovenox     Progress note handoff- awaiting STR placement,        79 yo F with hx of HTN, HLD, CAD s/p stent (2019), Dysphagia/Zenker diverticulum, COPD (on home O2 prn), Current smoker presents to ED for mechanical fall with head trauma.     # Scalp laceration 2/2 mechanical fall   # Acute Anemia ( Hb b/l 14-15 > 11.9 ) due to severe bleeding from scalp laceration prior to admission   - CTH: No evidence of acute intracranial pathology. Left parietal scalp soft tissue swelling with subcutaneous hematoma.  - CT c-spine: No evidence of acute cervical spine fracture or subluxation.  - s/p laceration repaired by ED.   - local wound care.   - fall precaution.   - continue with PT/Rehab.   - CM informed pt and family requesting STR , Janelle Stanford is interested. CM attempted to contact Janelle Stanford multiple awaiting response  - Gait - wobbly, present before fall, OE bilateral LE weakness, pt has knee pain- Follow up xray right knee.   - Knee xray: osteoarthritis bilateral knee  - neurochecks q8h    # Recent admission for COPD exacerbation   - recently treated with steroid and abx.   - c/w home inhalers for now.   - supplemental O2 prn    # Leukocytosis likely reactive - resolved     # HTN  - Hold anti-HTN medication for now as BP borderline currently. May resume on amlodipine if BP persistently elevated.     # HLD   - c/w home med.     # DM II   - monitor FS AC HS.  SSI for now, A1C 6.7     # CAD s/p stent   - c/w home med.     # DVT ppx: lovenox     Progress note handoff- discharge today

## 2024-09-11 NOTE — DISCHARGE NOTE NURSING/CASE MANAGEMENT/SOCIAL WORK - NSPROMEDSBROUGHTTOHOSP_GEN_A_NUR
no Yenni Finn)  Obstetrics and Gynecology  [address] [place] [address], [address]  [place], [place] [id number]  Phone: [phone]  Fax: [phone]  Follow Up Time:

## 2024-09-11 NOTE — DISCHARGE NOTE NURSING/CASE MANAGEMENT/SOCIAL WORK - NSDCPEFALRISK_GEN_ALL_CORE
MEDICARE WELLNESS VISIT NOTE    HISTORY OF PRESENT ILLNESS:   Tyree Longoria presents for his Subsequent Annual Medicare Wellness Visit.   He has no current complaints or concerns.      Patient Care Team:  Anand Gayle MD as PCP - General (Family Practice)        Patient Active Problem List   Diagnosis   • Paralysis agitans (CMS/[facility])   • Esophageal reflux   • History of tobacco abuse   • Surgical wound, non healing, initial encounter   • Fall   • Skin slip left arm   • Recurrent epistaxis right nares         Past Medical History:   Diagnosis Date   • Allergy    • Anxiety    • Colon polyps    • Depressive disorder    • Esophageal reflux    • GERD (gastroesophageal reflux disease)    • Gout    • Parkinson disease (CMS/[facility])    • Surgical wound, non healing, initial encounter 10/29/2020   • Unspecified sinusitis (chronic)          Past Surgical History:   Procedure Laterality Date   • Cholecystectomy     • Colonoscopy remove lesions by snare  2008   • Dermoid cyst  excision  2012    sebaceous cyst removed from back    • Nasal septum surgery  2003   • Removal gallbladder     • Service to gastroenterology     • Soft tissue mass excision Right 2021    Upper Back   • Vasectomy           Social History     Tobacco Use   • Smoking status: Former Smoker     Packs/day: 0.50     Years: 20.00     Pack years: 10.00     Types: Cigarettes     Quit date: 2007     Years since quittin.9   • Smokeless tobacco: Never Used   Vaping Use   • Vaping Use: never used   Substance Use Topics   • Alcohol use: Yes     Comment: occasionally   • Drug use: No     Drug use:    Drug Use:    No              Family History   Problem Relation Age of Onset   • Cancer Mother    • Kidney disease Mother    • Thyroid Mother    • Heart disease Father    • Stroke Father    • Thyroid Sister    • Cancer Sister        Current Outpatient Medications   Medication Sig Dispense Refill   • allopurinol (ZYLOPRIM) 100 MG tablet  Take 1 tablet by mouth daily. 90 tablet 3   • Vit C-Cholecalciferol-Catarino Hip (VITAMIN C & D3/CATARINO HIPS PO)      • pantoprazole (PROTONIX) 40 MG tablet TAKE 1 TABLET TWICE A  tablet 3   • polyethylene glycol (MIRALAX) powder Take 17 g by mouth 2 times daily. 3162 g 3   • carbidopa-levodopa (PARCOPA)  MG oral disintegrating tablet Take by mouth 3 times daily.      • amantadine (SYMMETREL) 100 MG capsule Take 1 capsule by mouth 3 times daily.     • clonazePAM (KLONOPIN) 0.5 MG tablet Take 1 tablet by mouth nightly as needed for Anxiety. 60 tablet 0     No current facility-administered medications for this visit.        The following items on the Medicare Health Risk Assessment were found to be positive  6 b.) How many servings of High Fiber / Whole Grain Foods to you have each day ( 1 serving = 1 cup cold cereal, 1/2 cup cooked cereal, 1 slice bread): 1 per day     7a.) Have you had a fall in the past year?: Yes     7b.) Do you feel unsteady when standing or walking?: Yes     11j.) Driven/Ridden in a car without wearing your seatbelt: Seldom     12.) Do you need help with any of the following activities?   (check all that apply): Dressing and grooming     15.) How confident are you that you can control and manage most of your health problems?: Somewhat confident         Vision and Hearing screens: Not performed    Advance Directive:   The patient has the following documents:  Power of  for Health Care    Cognitive/Functional Status: no evidence of cognitive dysfunction by direct observation    Opioid Review: Tyree is not taking opioid medications.    Recent PHQ 2/9 Score:    PHQ 2:  Date Adult PHQ 2 Score Adult PHQ 2 Interpretation   11/17/2021 0 No further screening needed       PHQ 9:       DEPRESSION ASSESSMENT/PLAN:  Depression screening is negative no further plan needed.     Body mass index is 29.94 kg/m².    BMI ASSESSMENT/PLAN:  Will continue to encourage a healthy living diet and regular  exercise as tolerated.    Needed Screening/Treatment:   Cardiovascular screening - Abdominal aortic US.  Needed follow up:  None    See orders.   See Patient Instructions section.   Return in about 1 year (around 11/17/2022) for Medicare Wellness Visit.     On 11/17/2021, IChristal RN scribed the services personally performed by Anand Gayle MD.    The documentation recorded by the scribe accurately and completely reflects the service(s) I personally performed and the decisions made by me.  Anand Gayle MD     For information on Fall & Injury Prevention, visit: [email or web address] OR  [email or web address] OR  https://www.cdc.gov/steadi/patient.html

## 2024-09-11 NOTE — DISCHARGE NOTE NURSING/CASE MANAGEMENT/SOCIAL WORK - PATIENT PORTAL LINK FT
You can access the FollowMyHealth Patient Portal offered by  by registering at the following website: [email or web address]. By joining [employer]’s FollowMyHealth portal, you will also be able to view your health information using other applications (apps) compatible with our system.

## 2024-09-11 NOTE — DISCHARGE NOTE NURSING/CASE MANAGEMENT/SOCIAL WORK - NSDCVIVACCINE_GEN_ALL_CORE_FT
Tdap; 07-Sep-2024 11:17; Krista Evans (NAOMY); [employer]; [id number] (Exp. Date: 07-Sep-2026); IntraMuscular; Deltoid Right.; 0.5 milliLiter(s); VIS (VIS Published: 09-May-2013, VIS Presented: 07-Sep-2024);

## 2024-09-11 NOTE — PROGRESS NOTE ADULT - REASON FOR ADMISSION
Mechanical fall
[FreeTextEntry1] : Exam today reveals a straight spine level pelvis no leg length discrepancy and on today's visit she has a perfectly symmetric normal gait without limp or antalgic component.  Both lower extremities are symmetric in appearance without atrophy or overt inflammatory changes present.  She has supple and full motion to both hips knees ankle and feet in a symmetric fashion.  Careful palpation of the entire axis of both lower extremities and pelvis reveals no evidence of objective points of tenderness present.  There are no tension signs present and she is neurologically intact.  The exam is benign

## 2024-09-11 NOTE — PROGRESS NOTE ADULT - SUBJECTIVE AND OBJECTIVE BOX
FENG DESTINY  78y, Female  Allergy: No Known Allergies    Hospital Day: 2d    Patient seen and examined earlier today. Feeling well, reports that her hemoglobin drop is likely due to significant bleeding from her scalp laceration at the time of her fall.     PMH/PSH:  PAST MEDICAL & SURGICAL HISTORY:  CVA (cerebral vascular accident)      Anxiety      HTN (hypertension)      COPD (chronic obstructive pulmonary disease)      S/P total abdominal hysterectomy          LAST 24-Hr EVENTS:    VITALS:  T(F): 98 (09-09-24 @ 12:48), Max: 98.1 (09-08-24 @ 20:15)  HR: 60 (09-09-24 @ 12:48)  BP: 107/56 (09-09-24 @ 12:48) (107/56 - 143/63)  RR: 18 (09-09-24 @ 12:48)  SpO2: 99% (09-09-24 @ 12:48)        TESTS & MEASUREMENTS:  Weight (Kg):                             11.6   8.03  )-----------( 170      ( 09 Sep 2024 07:33 )             36.9       09-09    145  |  99  |  11  ----------------------------<  128<H>  3.9   |  40<H>  |  0.5<L>    Ca    8.7      09 Sep 2024 07:33              Urinalysis with Rflx Culture (collected 09-07-24 @ 14:58)    Culture - Urine (collected 09-07-24 @ 14:58)  Source: Clean Catch None  Preliminary Report (09-09-24 @ 14:33):    50,000 - 99,000 CFU/mL Gram Positive Cocci in Pairs and Chains      Urinalysis Basic - ( 09 Sep 2024 07:33 )    Color: x / Appearance: x / SG: x / pH: x  Gluc: 128 mg/dL / Ketone: x  / Bili: x / Urobili: x   Blood: x / Protein: x / Nitrite: x   Leuk Esterase: x / RBC: x / WBC x   Sq Epi: x / Non Sq Epi: x / Bacteria: x        D-Dimer Assay, Quantitative: 281 ng/mL DDU (08-31-24 @ 05:00)            RADIOLOGY, ECG, & ADDITIONAL TESTS:      RECENT DIAGNOSTIC ORDERS:  Xray Knee 3 Views, Bilateral: Routine   Indication: knee pain  Transport: Portable  Exam Completed (09-09-24 @ 15:28)      MEDICATIONS:  MEDICATIONS  (STANDING):  atorvastatin 10 milliGRAM(s) Oral at bedtime  dextrose 5%. 1000 milliLiter(s) (100 mL/Hr) IV Continuous <Continuous>  dextrose 5%. 1000 milliLiter(s) (50 mL/Hr) IV Continuous <Continuous>  dextrose 50% Injectable 12.5 Gram(s) IV Push once  dextrose 50% Injectable 25 Gram(s) IV Push once  dextrose 50% Injectable 25 Gram(s) IV Push once  fluticasone propionate/ salmeterol 100-50 MICROgram(s) Diskus 1 Dose(s) Inhalation two times a day  glucagon  Injectable 1 milliGRAM(s) IntraMuscular once  insulin lispro (ADMELOG) corrective regimen sliding scale   SubCutaneous three times a day before meals  pantoprazole    Tablet 40 milliGRAM(s) Oral before breakfast  potassium chloride    Tablet ER 40 milliEquivalent(s) Oral once  tiotropium 2.5 MICROgram(s) Inhaler 2 Puff(s) Inhalation daily    MEDICATIONS  (PRN):  ALPRAZolam 0.5 milliGRAM(s) Oral at bedtime PRN for insomnia  dextrose Oral Gel 15 Gram(s) Oral once PRN Blood Glucose LESS THAN 70 milliGRAM(s)/deciliter      HOME MEDICATIONS:  ALPRAZolam 0.5 mg oral tablet (09-07)  amLODIPine 5 mg oral tablet (09-07)  Anoro Ellipta 62.5 mcg-25 mcg/inh inhalation powder (09-07)  atorvastatin 10 mg oral tablet (09-07)  metFORMIN 500 mg oral tablet (09-07)  Trelegy Ellipta 100 mcg-62.5 mcg-25 mcg/inh inhalation powder (09-07)      PHYSICAL EXAM:  GENERAL: A&O x3,  in NAD  HNENT: EOMI, PERRLA    NECK: No LAD/swelling  CHEST/LUNG:  CTAB no wheezes/rales/ronchi  HEART: RRR, No murmurs  ABDOMEN: Soft, NT, ND,  Bowel sounds present  EXTREMITIES:  Warm well perfused, no edema         
24H events:    Patient is a 78y old Female who presents with a chief complaint of Mechanical fall (08 Sep 2024 15:48)    Primary diagnosis of Fall          Today is hospital day 1d. Requested STR placement per family  No acute or major events overnight.      PAST MEDICAL & SURGICAL HISTORY  CVA (cerebral vascular accident)    Anxiety    HTN (hypertension)    COPD (chronic obstructive pulmonary disease)    S/P total abdominal hysterectomy      ALLERGIES:  No Known Allergies    MEDICATIONS:  STANDING MEDICATIONS  atorvastatin 10 milliGRAM(s) Oral at bedtime  dextrose 5%. 1000 milliLiter(s) IV Continuous <Continuous>  dextrose 5%. 1000 milliLiter(s) IV Continuous <Continuous>  dextrose 50% Injectable 25 Gram(s) IV Push once  dextrose 50% Injectable 25 Gram(s) IV Push once  dextrose 50% Injectable 12.5 Gram(s) IV Push once  fluticasone propionate/ salmeterol 100-50 MICROgram(s) Diskus 1 Dose(s) Inhalation two times a day  glucagon  Injectable 1 milliGRAM(s) IntraMuscular once  insulin lispro (ADMELOG) corrective regimen sliding scale   SubCutaneous three times a day before meals  pantoprazole    Tablet 40 milliGRAM(s) Oral before breakfast  potassium chloride    Tablet ER 40 milliEquivalent(s) Oral once  tiotropium 2.5 MICROgram(s) Inhaler 2 Puff(s) Inhalation daily    PRN MEDICATIONS  ALPRAZolam 0.5 milliGRAM(s) Oral at bedtime PRN  dextrose Oral Gel 15 Gram(s) Oral once PRN    VITALS:   T(F): 97  HR: 74  BP: 115/66  RR: 18  SpO2: 96%    PHYSICAL EXAM:  GENERAL: NAD, lying in bed comfortably  CHEST/LUNG: Clear to auscultation bilaterally; No rales, rhonchi, wheezing, or rubs. Unlabored respirations  HEART: Regular rate and rhythm; No murmurs, rubs, or gallops  ABDOMEN: BSx4; Soft, nontender, nondistended  EXTREMITIES:  No clubbing, cyanosis, or edema  NERVOUS SYSTEM:  No focal deficits     LABS:                        11.9   9.18  )-----------( 178      ( 08 Sep 2024 11:40 )             37.5     09-08    138  |  95<L>  |  17  ----------------------------<  307<H>  3.4<L>   |  38<H>  |  0.5<L>    Ca    8.5      08 Sep 2024 11:40    TPro  6.2  /  Alb  4.1  /  TBili  0.8  /  DBili  x   /  AST  41  /  ALT  70<H>  /  AlkPhos  103  09-07    PT/INR - ( 07 Sep 2024 10:45 )   PT: 12.80 sec;   INR: 1.12 ratio         PTT - ( 07 Sep 2024 10:45 )  PTT:29.3 sec  Urinalysis Basic - ( 08 Sep 2024 11:40 )    Color: x / Appearance: x / SG: x / pH: x  Gluc: 307 mg/dL / Ketone: x  / Bili: x / Urobili: x   Blood: x / Protein: x / Nitrite: x   Leuk Esterase: x / RBC: x / WBC x   Sq Epi: x / Non Sq Epi: x / Bacteria: x            Urinalysis with Rflx Culture (collected 07 Sep 2024 14:58)          RADIOLOGY:  CXR  Xray Chest 1 View- PORTABLE-Urgent:   ACC: [id number] EXAM:  XR CHEST PORTABLE URGENT 1V   ORDERED BY: KANCHAN HERNANDEZ     PROCEDURE DATE:  09/07/2024          INTERPRETATION:  CLINICAL HISTORY / REASON FOR EXAM: Shortness of breath.    COMPARISON: Chest radiograph from September 3, 2024.    TECHNIQUE/POSITIONING: Satisfactory. Single image, AP chest radiograph.    FINDINGS:    SUPPORT DEVICES: None.    CARDIAC/MEDIASTINUM/HILUM: Unchanged cardiac silhouette.    LUNG PARENCHYMA/PLEURA: No focal consolidation or pleural effusion. No  pneumothorax.    SKELETON/SOFT TISSUES: Unchanged.      IMPRESSION:    No radiographic evidence of acute cardiopulmonary disease.    --- End of Report ---            TERRA RUSSO MD; Attending Radiologist  This document has been electronically signed. Sep  7 2024  3:22PM (09-07-24 @ 11:40)      CT          
    DESTINY TONEY  78y, Female  Allergy: No Known Allergies    Hospital Day: 1d    Patient seen and examined earlier today. Patient presenting after recent discharge and subsequent fall at home. Informed CM that patient and family requesting STR placement.     PMH/PSH:  PAST MEDICAL & SURGICAL HISTORY:  CVA (cerebral vascular accident)      Anxiety      HTN (hypertension)      COPD (chronic obstructive pulmonary disease)      S/P total abdominal hysterectomy          LAST 24-Hr EVENTS:    VITALS:  T(F): 97.6 (09-08-24 @ 07:44), Max: 98.2 (09-07-24 @ 23:52)  HR: 57 (09-08-24 @ 07:44)  BP: 128/70 (09-08-24 @ 07:44) (112/57 - 128/70)  RR: 18 (09-08-24 @ 07:44)  SpO2: 96% (09-08-24 @ 07:44)        TESTS & MEASUREMENTS:  Weight (Kg):                             11.9   9.18  )-----------( 178      ( 08 Sep 2024 11:40 )             37.5     PT/INR - ( 07 Sep 2024 10:45 )   PT: 12.80 sec;   INR: 1.12 ratio         PTT - ( 07 Sep 2024 10:45 )  PTT:29.3 sec  09-08    138  |  95<L>  |  17  ----------------------------<  307<H>  3.4<L>   |  38<H>  |  0.5<L>    Ca    8.5      08 Sep 2024 11:40    TPro  6.2  /  Alb  4.1  /  TBili  0.8  /  DBili  x   /  AST  41  /  ALT  70<H>  /  AlkPhos  103  09-07    LIVER FUNCTIONS - ( 07 Sep 2024 10:45 )  Alb: 4.1 g/dL / Pro: 6.2 g/dL / ALK PHOS: 103 U/L / ALT: 70 U/L / AST: 41 U/L / GGT: x                 Urinalysis with Rflx Culture (collected 09-07-24 @ 14:58)      Urinalysis Basic - ( 08 Sep 2024 11:40 )    Color: x / Appearance: x / SG: x / pH: x  Gluc: 307 mg/dL / Ketone: x  / Bili: x / Urobili: x   Blood: x / Protein: x / Nitrite: x   Leuk Esterase: x / RBC: x / WBC x   Sq Epi: x / Non Sq Epi: x / Bacteria: x      Procalcitonin: 0.10 ng/mL (08-29-24 @ 19:50)    D-Dimer Assay, Quantitative: 281 ng/mL DDU (08-31-24 @ 05:00)            RADIOLOGY, ECG, & ADDITIONAL TESTS:      RECENT DIAGNOSTIC ORDERS:  Complete Blood Count + Automated Diff: 16:00 (09-08-24 @ 12:17)  Diet, Soft and Bite Sized:   Consistent Carbohydrate No Snacks (CSTCHO)  DASH/TLC Sodium & Cholesterol Restricted (DASH) (09-07-24 @ 16:47)  Glucose: STAT (09-07-24 @ 16:46)      MEDICATIONS:  MEDICATIONS  (STANDING):  atorvastatin 10 milliGRAM(s) Oral at bedtime  dextrose 5%. 1000 milliLiter(s) (100 mL/Hr) IV Continuous <Continuous>  dextrose 5%. 1000 milliLiter(s) (50 mL/Hr) IV Continuous <Continuous>  dextrose 50% Injectable 25 Gram(s) IV Push once  dextrose 50% Injectable 25 Gram(s) IV Push once  dextrose 50% Injectable 12.5 Gram(s) IV Push once  enoxaparin Injectable 40 milliGRAM(s) SubCutaneous every 24 hours  fluticasone propionate/ salmeterol 100-50 MICROgram(s) Diskus 1 Dose(s) Inhalation two times a day  glucagon  Injectable 1 milliGRAM(s) IntraMuscular once  insulin lispro (ADMELOG) corrective regimen sliding scale   SubCutaneous three times a day before meals  pantoprazole    Tablet 40 milliGRAM(s) Oral before breakfast  tiotropium 2.5 MICROgram(s) Inhaler 2 Puff(s) Inhalation daily    MEDICATIONS  (PRN):  ALPRAZolam 0.5 milliGRAM(s) Oral at bedtime PRN for insomnia  dextrose Oral Gel 15 Gram(s) Oral once PRN Blood Glucose LESS THAN 70 milliGRAM(s)/deciliter      HOME MEDICATIONS:  ALPRAZolam 0.5 mg oral tablet (09-07)  amLODIPine 5 mg oral tablet (09-07)  Anoro Ellipta 62.5 mcg-25 mcg/inh inhalation powder (09-07)  atorvastatin 10 mg oral tablet (09-07)  metFORMIN 500 mg oral tablet (09-07)  Trelegy Ellipta 100 mcg-62.5 mcg-25 mcg/inh inhalation powder (09-07)      PHYSICAL EXAM:  GENERAL: A&O x3,  in NAD  HNENT: EOMI, PERRLA    NECK: No LAD/swelling  CHEST/LUNG:  CTAB no wheezes/rales/ronchi  HEART: RRR, No murmurs  ABDOMEN: Soft, NT, ND,  Bowel sounds present  EXTREMITIES:  Warm well perfused, no edema       
  DESTINY TONEY  78y, Female  Allergy: No Known Allergies    Hospital Day: 3d    Patient seen and examined earlier today. Feeling well, reports that her hemoglobin drop is likely due to significant bleeding from her scalp laceration at the time of her fall.     PMH/PSH:  PAST MEDICAL & SURGICAL HISTORY:  CVA (cerebral vascular accident)      Anxiety      HTN (hypertension)      COPD (chronic obstructive pulmonary disease)      S/P total abdominal hysterectomy          LAST 24-Hr EVENTS:    VITALS:  T(F): 98 (09-09-24 @ 12:48), Max: 98.1 (09-08-24 @ 20:15)  HR: 60 (09-09-24 @ 12:48)  BP: 107/56 (09-09-24 @ 12:48) (107/56 - 143/63)  RR: 18 (09-09-24 @ 12:48)  SpO2: 99% (09-09-24 @ 12:48)        TESTS & MEASUREMENTS:  Weight (Kg):                             11.6   8.03  )-----------( 170      ( 09 Sep 2024 07:33 )             36.9       09-09    145  |  99  |  11  ----------------------------<  128<H>  3.9   |  40<H>  |  0.5<L>    Ca    8.7      09 Sep 2024 07:33              Urinalysis with Rflx Culture (collected 09-07-24 @ 14:58)    Culture - Urine (collected 09-07-24 @ 14:58)  Source: Clean Catch None  Preliminary Report (09-09-24 @ 14:33):    50,000 - 99,000 CFU/mL Gram Positive Cocci in Pairs and Chains      Urinalysis Basic - ( 09 Sep 2024 07:33 )    Color: x / Appearance: x / SG: x / pH: x  Gluc: 128 mg/dL / Ketone: x  / Bili: x / Urobili: x   Blood: x / Protein: x / Nitrite: x   Leuk Esterase: x / RBC: x / WBC x   Sq Epi: x / Non Sq Epi: x / Bacteria: x        D-Dimer Assay, Quantitative: 281 ng/mL DDU (08-31-24 @ 05:00)            RADIOLOGY, ECG, & ADDITIONAL TESTS:      RECENT DIAGNOSTIC ORDERS:  Xray Knee 3 Views, Bilateral: Routine   Indication: knee pain  Transport: Portable  Exam Completed (09-09-24 @ 15:28)      MEDICATIONS:  MEDICATIONS  (STANDING):  atorvastatin 10 milliGRAM(s) Oral at bedtime  dextrose 5%. 1000 milliLiter(s) (100 mL/Hr) IV Continuous <Continuous>  dextrose 5%. 1000 milliLiter(s) (50 mL/Hr) IV Continuous <Continuous>  dextrose 50% Injectable 12.5 Gram(s) IV Push once  dextrose 50% Injectable 25 Gram(s) IV Push once  dextrose 50% Injectable 25 Gram(s) IV Push once  fluticasone propionate/ salmeterol 100-50 MICROgram(s) Diskus 1 Dose(s) Inhalation two times a day  glucagon  Injectable 1 milliGRAM(s) IntraMuscular once  insulin lispro (ADMELOG) corrective regimen sliding scale   SubCutaneous three times a day before meals  pantoprazole    Tablet 40 milliGRAM(s) Oral before breakfast  potassium chloride    Tablet ER 40 milliEquivalent(s) Oral once  tiotropium 2.5 MICROgram(s) Inhaler 2 Puff(s) Inhalation daily    MEDICATIONS  (PRN):  ALPRAZolam 0.5 milliGRAM(s) Oral at bedtime PRN for insomnia  dextrose Oral Gel 15 Gram(s) Oral once PRN Blood Glucose LESS THAN 70 milliGRAM(s)/deciliter      HOME MEDICATIONS:  ALPRAZolam 0.5 mg oral tablet (09-07)  amLODIPine 5 mg oral tablet (09-07)  Anoro Ellipta 62.5 mcg-25 mcg/inh inhalation powder (09-07)  atorvastatin 10 mg oral tablet (09-07)  metFORMIN 500 mg oral tablet (09-07)  Trelegy Ellipta 100 mcg-62.5 mcg-25 mcg/inh inhalation powder (09-07)      PHYSICAL EXAM:  GENERAL: A&O x3,  in NAD  HNENT: EOMI, PERRLA    NECK: No LAD/swelling  CHEST/LUNG:  CTAB no wheezes/rales/ronchi  HEART: RRR, No murmurs  ABDOMEN: Soft, NT, ND,  Bowel sounds present  EXTREMITIES:  Warm well perfused, no edema         
  DESTINY TONEY  78y, Female  Allergy: No Known Allergies    Hospital Day: 3d    Patient seen and examined earlier today. No complaints , pending STR.     PMH/PSH:  PAST MEDICAL & SURGICAL HISTORY:  CVA (cerebral vascular accident)      Anxiety      HTN (hypertension)      COPD (chronic obstructive pulmonary disease)      S/P total abdominal hysterectomy          LAST 24-Hr EVENTS:    VITALS:  T(F): 98.1 (09-10-24 @ 12:23), Max: 98.1 (09-09-24 @ 19:37)  HR: 71 (09-10-24 @ 12:23)  BP: 102/55 (09-10-24 @ 12:23) (102/55 - 147/69)  RR: 18 (09-10-24 @ 12:23)  SpO2: 98% (09-10-24 @ 12:23)        TESTS & MEASUREMENTS:  Weight (Kg):                             11.9   8.04  )-----------( 195      ( 10 Sep 2024 08:43 )             38.8       09-10    143  |  99  |  10  ----------------------------<  145<H>  4.2   |  37<H>  |  0.6<L>    Ca    8.9      10 Sep 2024 08:43              Urinalysis with Rflx Culture (collected 09-07-24 @ 14:58)    Culture - Urine (collected 09-07-24 @ 14:58)  Source: Clean Catch None  Preliminary Report (09-09-24 @ 14:33):    50,000 - 99,000 CFU/mL Gram Positive Cocci in Pairs and Chains      Urinalysis Basic - ( 10 Sep 2024 08:43 )    Color: x / Appearance: x / SG: x / pH: x  Gluc: 145 mg/dL / Ketone: x  / Bili: x / Urobili: x   Blood: x / Protein: x / Nitrite: x   Leuk Esterase: x / RBC: x / WBC x   Sq Epi: x / Non Sq Epi: x / Bacteria: x                  RADIOLOGY, ECG, & ADDITIONAL TESTS:      RECENT DIAGNOSTIC ORDERS:  Basic Metabolic Panel: Repeat From: 09-Sep-2024 19:29 To: 15-Sep-2024 04:30, Every 1 day(s) (09-09-24 @ 19:29)  Complete Blood Count: Repeat From: 09-Sep-2024 19:28 To: 15-Sep-2024 04:30, Every 1 day(s) (09-09-24 @ 19:29)      MEDICATIONS:  MEDICATIONS  (STANDING):  atorvastatin 10 milliGRAM(s) Oral at bedtime  dextrose 5%. 1000 milliLiter(s) (50 mL/Hr) IV Continuous <Continuous>  dextrose 5%. 1000 milliLiter(s) (100 mL/Hr) IV Continuous <Continuous>  dextrose 50% Injectable 25 Gram(s) IV Push once  dextrose 50% Injectable 25 Gram(s) IV Push once  dextrose 50% Injectable 12.5 Gram(s) IV Push once  enoxaparin Injectable 40 milliGRAM(s) SubCutaneous every 24 hours  fluticasone propionate/ salmeterol 100-50 MICROgram(s) Diskus 1 Dose(s) Inhalation two times a day  glucagon  Injectable 1 milliGRAM(s) IntraMuscular once  insulin lispro (ADMELOG) corrective regimen sliding scale   SubCutaneous three times a day before meals  pantoprazole    Tablet 40 milliGRAM(s) Oral before breakfast  tiotropium 2.5 MICROgram(s) Inhaler 2 Puff(s) Inhalation daily    MEDICATIONS  (PRN):  ALPRAZolam 0.5 milliGRAM(s) Oral at bedtime PRN for insomnia  dextrose Oral Gel 15 Gram(s) Oral once PRN Blood Glucose LESS THAN 70 milliGRAM(s)/deciliter      HOME MEDICATIONS:  ALPRAZolam 0.5 mg oral tablet (09-07)  amLODIPine 5 mg oral tablet (09-07)  Anoro Ellipta 62.5 mcg-25 mcg/inh inhalation powder (09-07)  atorvastatin 10 mg oral tablet (09-07)  metFORMIN 500 mg oral tablet (09-07)  Trelegy Ellipta 100 mcg-62.5 mcg-25 mcg/inh inhalation powder (09-07)      PHYSICAL EXAM:  GENERAL: A&O x3,  in NAD  HNENT: EOMI, PERRLA    NECK: No LAD/swelling  CHEST/LUNG:  CTAB no wheezes/rales/ronchi  HEART: RRR, No murmurs  ABDOMEN: Soft, NT, ND,  Bowel sounds present  EXTREMITIES:  Warm well perfused, no edema       
  FENG DESTINY  78y, Female  Allergy: No Known Allergies    Hospital Day: 2d    Patient seen and examined earlier today. Feeling well, reports that her hemoglobin drop is likely due to significant bleeding from her scalp laceration at the time of her fall.     PMH/PSH:  PAST MEDICAL & SURGICAL HISTORY:  CVA (cerebral vascular accident)      Anxiety      HTN (hypertension)      COPD (chronic obstructive pulmonary disease)      S/P total abdominal hysterectomy          LAST 24-Hr EVENTS:    VITALS:  T(F): 98 (09-09-24 @ 12:48), Max: 98.1 (09-08-24 @ 20:15)  HR: 60 (09-09-24 @ 12:48)  BP: 107/56 (09-09-24 @ 12:48) (107/56 - 143/63)  RR: 18 (09-09-24 @ 12:48)  SpO2: 99% (09-09-24 @ 12:48)        TESTS & MEASUREMENTS:  Weight (Kg):                             11.6   8.03  )-----------( 170      ( 09 Sep 2024 07:33 )             36.9       09-09    145  |  99  |  11  ----------------------------<  128<H>  3.9   |  40<H>  |  0.5<L>    Ca    8.7      09 Sep 2024 07:33              Urinalysis with Rflx Culture (collected 09-07-24 @ 14:58)    Culture - Urine (collected 09-07-24 @ 14:58)  Source: Clean Catch None  Preliminary Report (09-09-24 @ 14:33):    50,000 - 99,000 CFU/mL Gram Positive Cocci in Pairs and Chains      Urinalysis Basic - ( 09 Sep 2024 07:33 )    Color: x / Appearance: x / SG: x / pH: x  Gluc: 128 mg/dL / Ketone: x  / Bili: x / Urobili: x   Blood: x / Protein: x / Nitrite: x   Leuk Esterase: x / RBC: x / WBC x   Sq Epi: x / Non Sq Epi: x / Bacteria: x        D-Dimer Assay, Quantitative: 281 ng/mL DDU (08-31-24 @ 05:00)            RADIOLOGY, ECG, & ADDITIONAL TESTS:      RECENT DIAGNOSTIC ORDERS:  Xray Knee 3 Views, Bilateral: Routine   Indication: knee pain  Transport: Portable  Exam Completed (09-09-24 @ 15:28)      MEDICATIONS:  MEDICATIONS  (STANDING):  atorvastatin 10 milliGRAM(s) Oral at bedtime  dextrose 5%. 1000 milliLiter(s) (100 mL/Hr) IV Continuous <Continuous>  dextrose 5%. 1000 milliLiter(s) (50 mL/Hr) IV Continuous <Continuous>  dextrose 50% Injectable 12.5 Gram(s) IV Push once  dextrose 50% Injectable 25 Gram(s) IV Push once  dextrose 50% Injectable 25 Gram(s) IV Push once  fluticasone propionate/ salmeterol 100-50 MICROgram(s) Diskus 1 Dose(s) Inhalation two times a day  glucagon  Injectable 1 milliGRAM(s) IntraMuscular once  insulin lispro (ADMELOG) corrective regimen sliding scale   SubCutaneous three times a day before meals  pantoprazole    Tablet 40 milliGRAM(s) Oral before breakfast  potassium chloride    Tablet ER 40 milliEquivalent(s) Oral once  tiotropium 2.5 MICROgram(s) Inhaler 2 Puff(s) Inhalation daily    MEDICATIONS  (PRN):  ALPRAZolam 0.5 milliGRAM(s) Oral at bedtime PRN for insomnia  dextrose Oral Gel 15 Gram(s) Oral once PRN Blood Glucose LESS THAN 70 milliGRAM(s)/deciliter      HOME MEDICATIONS:  ALPRAZolam 0.5 mg oral tablet (09-07)  amLODIPine 5 mg oral tablet (09-07)  Anoro Ellipta 62.5 mcg-25 mcg/inh inhalation powder (09-07)  atorvastatin 10 mg oral tablet (09-07)  metFORMIN 500 mg oral tablet (09-07)  Trelegy Ellipta 100 mcg-62.5 mcg-25 mcg/inh inhalation powder (09-07)      PHYSICAL EXAM:  GENERAL: A&O x3,  in NAD  HNENT: EOMI, PERRLA    NECK: No LAD/swelling  CHEST/LUNG:  CTAB no wheezes/rales/ronchi  HEART: RRR, No murmurs  ABDOMEN: Soft, NT, ND,  Bowel sounds present  EXTREMITIES:  Warm well perfused, no edema         
DESTINY TONEY 78y Female  MRN#: [id number]     Hospital Day: 4d    Pt is currently admitted with the primary diagnosis of  Other fall        SUBJECTIVE     Overnight events  None    Subjective complaints  Pt was evaluated this am. Patient denied any active complaints and per patient his symptoms are improving                                            ----------------------------------------------------------  OBJECTIVE  PAST MEDICAL & SURGICAL HISTORY  CVA (cerebral vascular accident)    Anxiety    HTN (hypertension)    COPD (chronic obstructive pulmonary disease)    S/P total abdominal hysterectomy                                              -----------------------------------------------------------  ALLERGIES:  No Known Allergies                                            ------------------------------------------------------------    HOME MEDICATIONS  Home Medications:  ALPRAZolam 0.5 mg oral tablet: 1 tab(s) orally once a day (at bedtime) as needed for  anxiety (07 Sep 2024 16:37)  amLODIPine 5 mg oral tablet: 1 tab(s) orally once a day (07 Sep 2024 16:37)  Anoro Ellipta 62.5 mcg-25 mcg/inh inhalation powder: 1 puff(s) inhaled once a day (07 Sep 2024 16:37)  atorvastatin 10 mg oral tablet: 1 tab(s) orally once a day (07 Sep 2024 16:37)  metFORMIN 500 mg oral tablet: 1 tab(s) orally 2 times a day (07 Sep 2024 16:37)  Trelegy Ellipta 100 mcg-62.5 mcg-25 mcg/inh inhalation powder: 1 puff(s) inhaled 2 times a day (07 Sep 2024 16:37)                           MEDICATIONS:  STANDING MEDICATIONS  atorvastatin 10 milliGRAM(s) Oral at bedtime  dextrose 5%. 1000 milliLiter(s) IV Continuous <Continuous>  dextrose 5%. 1000 milliLiter(s) IV Continuous <Continuous>  dextrose 50% Injectable 25 Gram(s) IV Push once  dextrose 50% Injectable 25 Gram(s) IV Push once  dextrose 50% Injectable 12.5 Gram(s) IV Push once  enoxaparin Injectable 40 milliGRAM(s) SubCutaneous every 24 hours  fluticasone propionate/ salmeterol 100-50 MICROgram(s) Diskus 1 Dose(s) Inhalation two times a day  glucagon  Injectable 1 milliGRAM(s) IntraMuscular once  insulin lispro (ADMELOG) corrective regimen sliding scale   SubCutaneous three times a day before meals  pantoprazole    Tablet 40 milliGRAM(s) Oral before breakfast  tiotropium 2.5 MICROgram(s) Inhaler 2 Puff(s) Inhalation daily    PRN MEDICATIONS  ALPRAZolam 0.5 milliGRAM(s) Oral at bedtime PRN  dextrose Oral Gel 15 Gram(s) Oral once PRN                                            ------------------------------------------------------------  VITAL SIGNS: Last 24 Hours  T(C): 36.4 (11 Sep 2024 05:10), Max: 36.8 (10 Sep 2024 18:55)  T(F): 97.6 (11 Sep 2024 05:10), Max: 98.2 (10 Sep 2024 18:55)  HR: 59 (11 Sep 2024 05:10) (59 - 71)  BP: 125/65 (11 Sep 2024 05:10) (102/55 - 125/65)  BP(mean): --  RR: 19 (11 Sep 2024 05:10) (18 - 19)  SpO2: 97% (11 Sep 2024 05:10) (97% - 98%)                                             --------------------------------------------------------------  LABS:                        11.9   8.04  )-----------( 195      ( 10 Sep 2024 08:43 )             38.8     09-10    143  |  99  |  10  ----------------------------<  145<H>  4.2   |  37<H>  |  0.6<L>    Ca    8.9      10 Sep 2024 08:43        Urinalysis Basic - ( 10 Sep 2024 08:43 )    Color: x / Appearance: x / SG: x / pH: x  Gluc: 145 mg/dL / Ketone: x  / Bili: x / Urobili: x   Blood: x / Protein: x / Nitrite: x   Leuk Esterase: x / RBC: x / WBC x   Sq Epi: x / Non Sq Epi: x / Bacteria: x                                                            -------------------------------------------------------------  RADIOLOGY:   Xray Knee 3 Views, Bilateral (09.09.24 @ 16:12) >  Left: No acute fractureor dislocation. Mild tricompartmental   osteoarthritis. No joint effusion.    Right: No acute fracture or dislocation. No joint effusion. Mild   tricompartmental osteoarthritis, with a posterior 9 mm joint body.     Xray Pelvis AP only (09.07.24 @ 11:41) >    Study limited by osteopenia. No definitive evidence of acute displaced   fracture. Degenerative changes.                                                  --------------------------------------------------------------    PHYSICAL EXAM:  GENERAL: NAD, lying in bed comfortably  HEAD:  Atraumatic, Normocephalic  EYES: EOMI, conjunctiva and sclera clear  ENT: Moist mucous membranes  NECK: Supple, No JVD  CHEST/LUNG: Clear to auscultation bilaterally; No rales, rhonchi, wheezing, or rubs. Unlabored respirations  HEART: regular rate and rhythm; No murmurs, rubs, or gallops  ABDOMEN: Bowel sounds present; Soft, Nontender, Nondistended.    EXTREMITIES: Warm. No clubbing, cyanosis, or edema  NERVOUS SYSTEM:  Alert & Oriented X3. No focal deficits   SKIN: No rashes or lesions                                           --------------------------------------------------------------

## 2024-09-17 DIAGNOSIS — J96.21 ACUTE AND CHRONIC RESPIRATORY FAILURE WITH HYPOXIA: ICD-10-CM

## 2024-09-17 DIAGNOSIS — J96.22 ACUTE AND CHRONIC RESPIRATORY FAILURE WITH HYPERCAPNIA: ICD-10-CM

## 2024-09-17 DIAGNOSIS — Z99.81 DEPENDENCE ON SUPPLEMENTAL OXYGEN: ICD-10-CM

## 2024-09-17 DIAGNOSIS — Z79.51 LONG TERM (CURRENT) USE OF INHALED STEROIDS: ICD-10-CM

## 2024-09-17 DIAGNOSIS — E11.9 TYPE 2 DIABETES MELLITUS WITHOUT COMPLICATIONS: ICD-10-CM

## 2024-09-17 DIAGNOSIS — Z79.82 LONG TERM (CURRENT) USE OF ASPIRIN: ICD-10-CM

## 2024-09-17 DIAGNOSIS — J06.9 ACUTE UPPER RESPIRATORY INFECTION, UNSPECIFIED: ICD-10-CM

## 2024-09-17 DIAGNOSIS — Z95.5 PRESENCE OF CORONARY ANGIOPLASTY IMPLANT AND GRAFT: ICD-10-CM

## 2024-09-17 DIAGNOSIS — K22.5 DIVERTICULUM OF ESOPHAGUS, ACQUIRED: ICD-10-CM

## 2024-09-17 DIAGNOSIS — F17.210 NICOTINE DEPENDENCE, CIGARETTES, UNCOMPLICATED: ICD-10-CM

## 2024-09-17 DIAGNOSIS — I25.10 ATHEROSCLEROTIC HEART DISEASE OF NATIVE CORONARY ARTERY WITHOUT ANGINA PECTORIS: ICD-10-CM

## 2024-09-17 DIAGNOSIS — J44.1 CHRONIC OBSTRUCTIVE PULMONARY DISEASE WITH (ACUTE) EXACERBATION: ICD-10-CM

## 2024-09-17 DIAGNOSIS — Z66 DO NOT RESUSCITATE: ICD-10-CM

## 2024-09-17 DIAGNOSIS — J98.01 ACUTE BRONCHOSPASM: ICD-10-CM

## 2024-09-17 DIAGNOSIS — Z90.710 ACQUIRED ABSENCE OF BOTH CERVIX AND UTERUS: ICD-10-CM

## 2024-09-17 DIAGNOSIS — Z79.52 LONG TERM (CURRENT) USE OF SYSTEMIC STEROIDS: ICD-10-CM

## 2024-09-17 DIAGNOSIS — E78.5 HYPERLIPIDEMIA, UNSPECIFIED: ICD-10-CM

## 2024-09-17 DIAGNOSIS — Z86.73 PERSONAL HISTORY OF TRANSIENT ISCHEMIC ATTACK (TIA), AND CEREBRAL INFARCTION WITHOUT RESIDUAL DEFICITS: ICD-10-CM

## 2024-09-17 DIAGNOSIS — E87.5 HYPERKALEMIA: ICD-10-CM

## 2024-09-17 DIAGNOSIS — B34.8 OTHER VIRAL INFECTIONS OF UNSPECIFIED SITE: ICD-10-CM

## 2024-09-17 DIAGNOSIS — I10 ESSENTIAL (PRIMARY) HYPERTENSION: ICD-10-CM

## 2024-09-18 DIAGNOSIS — I25.10 ATHEROSCLEROTIC HEART DISEASE OF NATIVE CORONARY ARTERY WITHOUT ANGINA PECTORIS: ICD-10-CM

## 2024-09-18 DIAGNOSIS — S01.01XA LACERATION WITHOUT FOREIGN BODY OF SCALP, INITIAL ENCOUNTER: ICD-10-CM

## 2024-09-18 DIAGNOSIS — J44.9 CHRONIC OBSTRUCTIVE PULMONARY DISEASE, UNSPECIFIED: ICD-10-CM
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
## 2024-09-18 DIAGNOSIS — E11.9 TYPE 2 DIABETES MELLITUS WITHOUT COMPLICATIONS: ICD-10-CM

## 2024-09-18 DIAGNOSIS — Z86.73 PERSONAL HISTORY OF TRANSIENT ISCHEMIC ATTACK (TIA), AND CEREBRAL INFARCTION WITHOUT RESIDUAL DEFICITS: ICD-10-CM

## 2024-09-18 DIAGNOSIS — D62 ACUTE POSTHEMORRHAGIC ANEMIA: ICD-10-CM

## 2024-09-18 DIAGNOSIS — J96.12 CHRONIC RESPIRATORY FAILURE WITH HYPERCAPNIA: ICD-10-CM

## 2024-09-18 DIAGNOSIS — Z79.84 LONG TERM (CURRENT) USE OF ORAL HYPOGLYCEMIC DRUGS: ICD-10-CM

## 2024-09-18 DIAGNOSIS — Z79.899 OTHER LONG TERM (CURRENT) DRUG THERAPY: ICD-10-CM

## 2024-09-18 DIAGNOSIS — I10 ESSENTIAL (PRIMARY) HYPERTENSION: ICD-10-CM

## 2024-09-18 DIAGNOSIS — Z90.710 ACQUIRED ABSENCE OF BOTH CERVIX AND UTERUS: ICD-10-CM

## 2024-09-18 DIAGNOSIS — E78.5 HYPERLIPIDEMIA, UNSPECIFIED: ICD-10-CM

## 2024-09-18 DIAGNOSIS — K22.5 DIVERTICULUM OF ESOPHAGUS, ACQUIRED: ICD-10-CM
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
## 2024-09-18 DIAGNOSIS — Z95.5 PRESENCE OF CORONARY ANGIOPLASTY IMPLANT AND GRAFT: ICD-10-CM
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
## 2024-09-18 DIAGNOSIS — F17.210 NICOTINE DEPENDENCE, CIGARETTES, UNCOMPLICATED: ICD-10-CM

## 2024-09-18 DIAGNOSIS — S00.03XA CONTUSION OF SCALP, INITIAL ENCOUNTER: ICD-10-CM

## 2024-09-26 ENCOUNTER — APPOINTMENT (OUTPATIENT)
Dept: PULMONOLOGY | Facility: CLINIC | Age: 79
End: 2024-09-26
Payer: MEDICARE

## 2024-09-26 VITALS
DIASTOLIC BLOOD PRESSURE: 70 MMHG | WEIGHT: 134 LBS | OXYGEN SATURATION: 90 % | HEIGHT: 66 IN | HEART RATE: 97 BPM | SYSTOLIC BLOOD PRESSURE: 150 MMHG | BODY MASS INDEX: 21.53 KG/M2

## 2024-09-26 DIAGNOSIS — G47.33 OBSTRUCTIVE SLEEP APNEA (ADULT) (PEDIATRIC): ICD-10-CM

## 2024-09-26 DIAGNOSIS — J44.1 CHRONIC OBSTRUCTIVE PULMONARY DISEASE WITH (ACUTE) EXACERBATION: ICD-10-CM

## 2024-09-26 DIAGNOSIS — J44.9 CHRONIC OBSTRUCTIVE PULMONARY DISEASE, UNSPECIFIED: ICD-10-CM

## 2024-09-26 PROCEDURE — G2211 COMPLEX E/M VISIT ADD ON: CPT

## 2024-09-26 PROCEDURE — 99214 OFFICE O/P EST MOD 30 MIN: CPT

## 2024-09-26 RX ORDER — ALBUTEROL SULFATE 2.5 MG/3ML
(2.5 MG/3ML) SOLUTION RESPIRATORY (INHALATION) 4 TIMES DAILY
Qty: 1 | Refills: 3 | Status: ACTIVE | COMMUNITY
Start: 2024-09-26 | End: 1900-01-01

## 2024-09-26 RX ORDER — ALBUTEROL SULFATE 90 UG/1
108 (90 BASE) INHALANT RESPIRATORY (INHALATION) EVERY 4 HOURS
Qty: 1 | Refills: 5 | Status: ACTIVE | COMMUNITY
Start: 2024-09-26 | End: 1900-01-01

## 2024-09-26 RX ORDER — PREDNISONE 20 MG/1
20 TABLET ORAL
Qty: 15 | Refills: 0 | Status: ACTIVE | COMMUNITY
Start: 2024-09-26 | End: 1900-01-01

## 2024-09-26 RX ORDER — FLUTICASONE FUROATE, UMECLIDINIUM BROMIDE AND VILANTEROL TRIFENATATE 100; 62.5; 25 UG/1; UG/1; UG/1
100-62.5-25 POWDER RESPIRATORY (INHALATION)
Qty: 60 | Refills: 5 | Status: ACTIVE | COMMUNITY
Start: 2024-09-26 | End: 1900-01-01

## 2024-09-26 NOTE — DISCUSSION/SUMMARY
[FreeTextEntry1] : COPD SMOKING COUNSELING NO  EXACERBATION SOB ON EXERTION/ PFT CXR NOTED CHEST CT DECLINED SMOKING COUSELING HER POX WAS 88% RA AT REST ON HOME OXYGEN

## 2024-09-26 NOTE — HISTORY OF PRESENT ILLNESS
[TextBox_4] : COPD STILL ACTIVELY SMOKING SOB ON EXERTION SP RECENT EXACERBATION ON ANORO NOT COMPLIANT PFT SEVERE COPD

## 2024-09-26 NOTE — PHYSICAL EXAM
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

## 2024-11-22 ENCOUNTER — APPOINTMENT (OUTPATIENT)
Dept: PULMONOLOGY | Facility: CLINIC | Age: 79
End: 2024-11-22

## 2025-02-26 ENCOUNTER — EMERGENCY (EMERGENCY)
Facility: HOSPITAL | Age: 80
LOS: 0 days | Discharge: LEFT BEFORE DISCHARGE | End: 2025-02-26
Attending: EMERGENCY MEDICINE
Payer: MEDICARE

## 2025-02-26 VITALS — WEIGHT: 160.06 LBS

## 2025-02-26 DIAGNOSIS — R07.89 OTHER CHEST PAIN: ICD-10-CM

## 2025-02-26 DIAGNOSIS — F17.200 NICOTINE DEPENDENCE, UNSPECIFIED, UNCOMPLICATED: ICD-10-CM

## 2025-02-26 DIAGNOSIS — J44.9 CHRONIC OBSTRUCTIVE PULMONARY DISEASE, UNSPECIFIED: ICD-10-CM
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
## 2025-02-26 DIAGNOSIS — Z53.29 PROCEDURE AND TREATMENT NOT CARRIED OUT BECAUSE OF PATIENT'S DECISION FOR OTHER REASONS: ICD-10-CM

## 2025-02-26 DIAGNOSIS — Z99.81 DEPENDENCE ON SUPPLEMENTAL OXYGEN: ICD-10-CM

## 2025-02-26 DIAGNOSIS — S80.211A ABRASION, RIGHT KNEE, INITIAL ENCOUNTER: ICD-10-CM
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
## 2025-02-26 DIAGNOSIS — E11.9 TYPE 2 DIABETES MELLITUS WITHOUT COMPLICATIONS: ICD-10-CM

## 2025-02-26 DIAGNOSIS — Z90.710 ACQUIRED ABSENCE OF BOTH CERVIX AND UTERUS: Chronic | ICD-10-CM

## 2025-02-26 DIAGNOSIS — I10 ESSENTIAL (PRIMARY) HYPERTENSION: ICD-10-CM

## 2025-02-26 LAB
ALBUMIN SERPL ELPH-MCNC: 4.6 G/DL — SIGNIFICANT CHANGE UP (ref 3.5–5.2)
ALP SERPL-CCNC: 101 U/L — SIGNIFICANT CHANGE UP (ref 30–115)
ALT FLD-CCNC: 15 U/L — SIGNIFICANT CHANGE UP (ref 0–41)
ANION GAP SERPL CALC-SCNC: 11 MMOL/L — SIGNIFICANT CHANGE UP (ref 7–14)
AST SERPL-CCNC: 19 U/L — SIGNIFICANT CHANGE UP (ref 0–41)
BASE EXCESS BLDV CALC-SCNC: 0.9 MMOL/L — SIGNIFICANT CHANGE UP (ref -2–3)
BASOPHILS # BLD AUTO: 0.04 K/UL — SIGNIFICANT CHANGE UP (ref 0–0.2)
BASOPHILS NFR BLD AUTO: 0.4 % — SIGNIFICANT CHANGE UP (ref 0–1)
BILIRUB SERPL-MCNC: 0.4 MG/DL — SIGNIFICANT CHANGE UP (ref 0.2–1.2)
BUN SERPL-MCNC: 18 MG/DL — SIGNIFICANT CHANGE UP (ref 10–20)
CA-I SERPL-SCNC: 1.32 MMOL/L — SIGNIFICANT CHANGE UP (ref 1.15–1.33)
CALCIUM SERPL-MCNC: 9.7 MG/DL — SIGNIFICANT CHANGE UP (ref 8.4–10.5)
CHLORIDE SERPL-SCNC: 103 MMOL/L — SIGNIFICANT CHANGE UP (ref 98–110)
CO2 SERPL-SCNC: 28 MMOL/L — SIGNIFICANT CHANGE UP (ref 17–32)
CREAT SERPL-MCNC: 0.8 MG/DL — SIGNIFICANT CHANGE UP (ref 0.7–1.5)
EGFR: 74 ML/MIN/1.73M2 — SIGNIFICANT CHANGE UP
EOSINOPHIL # BLD AUTO: 0.06 K/UL — SIGNIFICANT CHANGE UP (ref 0–0.7)
EOSINOPHIL NFR BLD AUTO: 0.7 % — SIGNIFICANT CHANGE UP (ref 0–8)
FLUAV AG NPH QL: SIGNIFICANT CHANGE UP
FLUBV AG NPH QL: SIGNIFICANT CHANGE UP
GAS PNL BLDV: 136 MMOL/L — SIGNIFICANT CHANGE UP (ref 136–145)
GAS PNL BLDV: SIGNIFICANT CHANGE UP
GLUCOSE SERPL-MCNC: 97 MG/DL — SIGNIFICANT CHANGE UP (ref 70–99)
HCO3 BLDV-SCNC: 29 MMOL/L — SIGNIFICANT CHANGE UP (ref 22–29)
HCT VFR BLD CALC: 43.7 % — SIGNIFICANT CHANGE UP (ref 37–47)
HCT VFR BLDA CALC: 48 % — HIGH (ref 34.5–46.5)
HGB BLD CALC-MCNC: 16.1 G/DL — SIGNIFICANT CHANGE UP (ref 11.7–16.1)
HGB BLD-MCNC: 14.1 G/DL — SIGNIFICANT CHANGE UP (ref 12–16)
IMM GRANULOCYTES NFR BLD AUTO: 0.3 % — SIGNIFICANT CHANGE UP (ref 0.1–0.3)
LACTATE BLDV-MCNC: 1.4 MMOL/L — SIGNIFICANT CHANGE UP (ref 0.5–2)
LYMPHOCYTES # BLD AUTO: 1.58 K/UL — SIGNIFICANT CHANGE UP (ref 1.2–3.4)
LYMPHOCYTES # BLD AUTO: 17.8 % — LOW (ref 20.5–51.1)
MCHC RBC-ENTMCNC: 29.3 PG — SIGNIFICANT CHANGE UP (ref 27–31)
MCHC RBC-ENTMCNC: 32.3 G/DL — SIGNIFICANT CHANGE UP (ref 32–37)
MCV RBC AUTO: 90.9 FL — SIGNIFICANT CHANGE UP (ref 81–99)
MONOCYTES # BLD AUTO: 0.55 K/UL — SIGNIFICANT CHANGE UP (ref 0.1–0.6)
MONOCYTES NFR BLD AUTO: 6.2 % — SIGNIFICANT CHANGE UP (ref 1.7–9.3)
NEUTROPHILS # BLD AUTO: 6.64 K/UL — HIGH (ref 1.4–6.5)
NEUTROPHILS NFR BLD AUTO: 74.6 % — SIGNIFICANT CHANGE UP (ref 42.2–75.2)
NRBC BLD AUTO-RTO: 0 /100 WBCS — SIGNIFICANT CHANGE UP (ref 0–0)
NT-PROBNP SERPL-SCNC: 735 PG/ML — HIGH (ref 0–300)
PCO2 BLDV: 57 MMHG — HIGH (ref 39–42)
PH BLDV: 7.31 — LOW (ref 7.32–7.43)
PLATELET # BLD AUTO: 233 K/UL — SIGNIFICANT CHANGE UP (ref 130–400)
PMV BLD: 10.4 FL — SIGNIFICANT CHANGE UP (ref 7.4–10.4)
PO2 BLDV: 15 MMHG — LOW (ref 25–45)
POTASSIUM BLDV-SCNC: 4.2 MMOL/L — SIGNIFICANT CHANGE UP (ref 3.5–5.1)
POTASSIUM SERPL-MCNC: 4.5 MMOL/L — SIGNIFICANT CHANGE UP (ref 3.5–5)
POTASSIUM SERPL-SCNC: 4.5 MMOL/L — SIGNIFICANT CHANGE UP (ref 3.5–5)
PROT SERPL-MCNC: 6.8 G/DL — SIGNIFICANT CHANGE UP (ref 6–8)
RBC # BLD: 4.81 M/UL — SIGNIFICANT CHANGE UP (ref 4.2–5.4)
RBC # FLD: 13.8 % — SIGNIFICANT CHANGE UP (ref 11.5–14.5)
RSV RNA NPH QL NAA+NON-PROBE: SIGNIFICANT CHANGE UP
SAO2 % BLDV: 21.4 % — LOW (ref 67–88)
SARS-COV-2 RNA SPEC QL NAA+PROBE: SIGNIFICANT CHANGE UP
SODIUM SERPL-SCNC: 142 MMOL/L — SIGNIFICANT CHANGE UP (ref 135–146)
TROPONIN T, HIGH SENSITIVITY RESULT: 18 NG/L — HIGH (ref 6–13)
WBC # BLD: 8.9 K/UL — SIGNIFICANT CHANGE UP (ref 4.8–10.8)
WBC # FLD AUTO: 8.9 K/UL — SIGNIFICANT CHANGE UP (ref 4.8–10.8)

## 2025-02-26 PROCEDURE — 0241U: CPT

## 2025-02-26 PROCEDURE — 82330 ASSAY OF CALCIUM: CPT

## 2025-02-26 PROCEDURE — 84295 ASSAY OF SERUM SODIUM: CPT

## 2025-02-26 PROCEDURE — 85014 HEMATOCRIT: CPT

## 2025-02-26 PROCEDURE — 85018 HEMOGLOBIN: CPT

## 2025-02-26 PROCEDURE — 84132 ASSAY OF SERUM POTASSIUM: CPT

## 2025-02-26 PROCEDURE — 99285 EMERGENCY DEPT VISIT HI MDM: CPT

## 2025-02-26 PROCEDURE — 36415 COLL VENOUS BLD VENIPUNCTURE: CPT

## 2025-02-26 PROCEDURE — 83605 ASSAY OF LACTIC ACID: CPT

## 2025-02-26 PROCEDURE — 36000 PLACE NEEDLE IN VEIN: CPT

## 2025-02-26 PROCEDURE — 99283 EMERGENCY DEPT VISIT LOW MDM: CPT | Mod: 25

## 2025-02-26 PROCEDURE — 80053 COMPREHEN METABOLIC PANEL: CPT

## 2025-02-26 PROCEDURE — 84484 ASSAY OF TROPONIN QUANT: CPT

## 2025-02-26 PROCEDURE — 83880 ASSAY OF NATRIURETIC PEPTIDE: CPT

## 2025-02-26 PROCEDURE — 82803 BLOOD GASES ANY COMBINATION: CPT

## 2025-02-26 PROCEDURE — 85025 COMPLETE CBC W/AUTO DIFF WBC: CPT

## 2025-02-26 RX ORDER — KETOROLAC TROMETHAMINE 10 MG
15 TABLET ORAL ONCE
Refills: 0 | Status: DISCONTINUED | OUTPATIENT
Start: 2025-02-26 | End: 2025-02-26

## 2025-02-26 RX ORDER — IPRATROPIUM BROMIDE AND ALBUTEROL SULFATE .5; 2.5 MG/3ML; MG/3ML
3 SOLUTION RESPIRATORY (INHALATION) ONCE
Refills: 0 | Status: COMPLETED | OUTPATIENT
Start: 2025-02-26 | End: 2025-02-26

## 2025-02-26 NOTE — ED PROVIDER NOTE - CARE PROVIDER_API CALL
Daphne Mckay  Internal Medicine  [address] [place] [id number]  Phone: [phone]  Fax: [phone]  Follow Up Time: 1-3 Days

## 2025-02-26 NOTE — ED PROVIDER NOTE - CLINICAL SUMMARY MEDICAL DECISION MAKING FREE TEXT BOX
Patient refused imaging.  Wants to go home.  Pulled out her IV.  States she feels better.  Patient aware that CT is better exam to look for rib fractures.  Patient is ANO x 3 and refuses.  Will sign AMA

## 2025-02-26 NOTE — ED PROVIDER NOTE - OBJECTIVE STATEMENT
79-year-old female with past medical history of COPD on 2L home O2, HTN, and DM presents to the ED for evaluation of persisting right sided chest/rib pain x 1 week s/p fall 1 week ago.  Patient went to see her PMD today, had x-ray that was unremarkable, but on the way home began to feel short of breath so son called EMS.  Patient received 1 Combivent with improvement of symptoms in ED.  She also endorses injuring her right knee during fall, but has been able to walk since.  She denies other complaints. Pt denies fever, chills, nausea, vomiting, abdominal pain, diarrhea, headache, dizziness, weakness, back pain, LOC, head trauma, urinary symptoms, cough, calf pain/swelling, recent travel, recent surgery.

## 2025-02-26 NOTE — ED PROVIDER NOTE - PROGRESS NOTE DETAILS
The patient wishes to leave against medical advice.  I have discussed the risks, benefits and alternatives (including the possibility of worsening of disease, pain, permanent disability, and/or death) with the patient and her son.  The patient voices understanding of these risks, benefits, and alternatives and still wishes to sign out against medical advice.  The patient is awake, alert, oriented  x 3 and has demonstrated capacity to refuse/direct care.  I have advised the patient that they can and should return immediately should they develop any worse/different/additional symptoms, or if they change their mind and want to continue their care.

## 2025-02-26 NOTE — ED PROVIDER NOTE - ATTENDING APP SHARED VISIT CONTRIBUTION OF CARE
79-year-old female past medical history noted including COPD, hypertension, diabetes, uses home oxygen presents accompanied by her son via EMS for evaluation of right-sided chest/rib pain for the last week.  Patient did fall a week ago.  Patient saw her PMD today and had x-rays done that were negative however on the way home she started to feel short of breath so son called EMS.  On exam patient is in NAD, AAOx3, speaks full clear sentences, lungs CTA B/L, tenderness in the right lower rib, no bruising, abdomen soft, no edema

## 2025-02-26 NOTE — ED PROVIDER NOTE - PHYSICAL EXAMINATION
VITAL SIGNS: I have reviewed nursing notes and confirm.  CONSTITUTIONAL: 80 yo F laying on stretcher; in no acute distress.  SKIN: Skin exam is warm and dry, no acute rash.  HEAD: Normocephalic; atraumatic.  EYES: Conjunctiva and sclera clear.  ENT: No nasal discharge; airway clear.   CARD: S1, S2 normal; no murmurs, gallops, or rubs. Regular rate and rhythm.  RESP: No wheezes, rales or rhonchi. Speaking in full sentences. (+) R lower rib TTP.  ABD: Normal bowel sounds; soft; non-distended; non-tender; No rebound or guarding. No CVA tenderness.  EXT: Normal ROM. No clubbing, cyanosis or edema. No calf TTP or swelling. (+) healing abrasions to R knee with mild TTP, no swelling/deformity. DP 2+.   NEURO: Alert, oriented. Grossly unremarkable. No focal deficits.

## 2025-02-26 NOTE — ED PROVIDER NOTE - PATIENT PORTAL LINK FT
You can access the FollowMyHealth Patient Portal offered by [facility] by registering at the following website: [email or web address]. By joining [employer]’s FollowMyHealth portal, you will also be able to view your health information using other applications (apps) compatible with our system.

## 2025-02-26 NOTE — ED ADULT TRIAGE NOTE - CHIEF COMPLAINT QUOTE
BIBA from doctor office  for SOB. Pt has copd. Pt pulse ox 89 on 2lnc. Pt received 1 combivent in the field. Pt fell last week right rib pain.
